# Patient Record
Sex: FEMALE | Race: WHITE | NOT HISPANIC OR LATINO | Employment: FULL TIME | URBAN - METROPOLITAN AREA
[De-identification: names, ages, dates, MRNs, and addresses within clinical notes are randomized per-mention and may not be internally consistent; named-entity substitution may affect disease eponyms.]

---

## 2018-03-27 ENCOUNTER — HOSPITAL ENCOUNTER (EMERGENCY)
Facility: HOSPITAL | Age: 38
Discharge: HOME | End: 2018-03-27
Attending: EMERGENCY MEDICINE
Payer: COMMERCIAL

## 2018-03-27 ENCOUNTER — APPOINTMENT (EMERGENCY)
Dept: RADIOLOGY | Facility: HOSPITAL | Age: 38
End: 2018-03-27
Payer: COMMERCIAL

## 2018-03-27 VITALS
DIASTOLIC BLOOD PRESSURE: 69 MMHG | OXYGEN SATURATION: 100 % | SYSTOLIC BLOOD PRESSURE: 106 MMHG | TEMPERATURE: 98 F | RESPIRATION RATE: 16 BRPM | HEART RATE: 56 BPM | WEIGHT: 164 LBS

## 2018-03-27 DIAGNOSIS — K29.70 GASTRITIS: Primary | ICD-10-CM

## 2018-03-27 DIAGNOSIS — R10.13 EPIGASTRIC PAIN: ICD-10-CM

## 2018-03-27 DIAGNOSIS — R07.9 CHEST PAIN: ICD-10-CM

## 2018-03-27 LAB
ALBUMIN SERPL BCP-MCNC: 3.7 G/DL (ref 3.5–5)
ALP SERPL-CCNC: 49 U/L (ref 46–116)
ALT SERPL W P-5'-P-CCNC: 40 U/L (ref 12–78)
ANION GAP SERPL CALCULATED.3IONS-SCNC: 7 MMOL/L (ref 4–13)
AST SERPL W P-5'-P-CCNC: 40 U/L (ref 5–45)
ATRIAL RATE: 55 BPM
BASOPHILS # BLD AUTO: 0.01 THOUSANDS/ΜL (ref 0–0.1)
BASOPHILS NFR BLD AUTO: 0 % (ref 0–1)
BILIRUB SERPL-MCNC: 0.4 MG/DL (ref 0.2–1)
BILIRUB UR QL STRIP: NEGATIVE
BUN SERPL-MCNC: 13 MG/DL (ref 5–25)
CALCIUM SERPL-MCNC: 9.2 MG/DL (ref 8.3–10.1)
CHLORIDE SERPL-SCNC: 102 MMOL/L (ref 100–108)
CLARITY UR: CLEAR
CO2 SERPL-SCNC: 28 MMOL/L (ref 21–32)
COLOR UR: YELLOW
COLOR, POC: YELLOW
CREAT SERPL-MCNC: 1.16 MG/DL (ref 0.6–1.3)
EOSINOPHIL # BLD AUTO: 0.01 THOUSAND/ΜL (ref 0–0.61)
EOSINOPHIL NFR BLD AUTO: 0 % (ref 0–6)
ERYTHROCYTE [DISTWIDTH] IN BLOOD BY AUTOMATED COUNT: 12.8 % (ref 11.6–15.1)
EXT PREG TEST URINE: NEGATIVE
GFR SERPL CREATININE-BSD FRML MDRD: 59 ML/MIN/1.73SQ M
GLUCOSE SERPL-MCNC: 112 MG/DL (ref 65–140)
GLUCOSE UR STRIP-MCNC: NEGATIVE MG/DL
HCT VFR BLD AUTO: 38.9 % (ref 34.8–46.1)
HGB BLD-MCNC: 12.9 G/DL (ref 11.5–15.4)
HGB UR QL STRIP.AUTO: NEGATIVE
KETONES UR STRIP-MCNC: NEGATIVE MG/DL
LEUKOCYTE ESTERASE UR QL STRIP: NEGATIVE
LIPASE SERPL-CCNC: 115 U/L (ref 73–393)
LYMPHOCYTES # BLD AUTO: 0.75 THOUSANDS/ΜL (ref 0.6–4.47)
LYMPHOCYTES NFR BLD AUTO: 15 % (ref 14–44)
MCH RBC QN AUTO: 32.3 PG (ref 26.8–34.3)
MCHC RBC AUTO-ENTMCNC: 33.2 G/DL (ref 31.4–37.4)
MCV RBC AUTO: 97 FL (ref 82–98)
MONOCYTES # BLD AUTO: 0.31 THOUSAND/ΜL (ref 0.17–1.22)
MONOCYTES NFR BLD AUTO: 6 % (ref 4–12)
NEUTROPHILS # BLD AUTO: 3.78 THOUSANDS/ΜL (ref 1.85–7.62)
NEUTS SEG NFR BLD AUTO: 79 % (ref 43–75)
NITRITE UR QL STRIP: NEGATIVE
NRBC BLD AUTO-RTO: 0 /100 WBCS
P AXIS: 74 DEGREES
PH UR STRIP.AUTO: 7 [PH] (ref 4.5–8)
PLATELET # BLD AUTO: 179 THOUSANDS/UL (ref 149–390)
PMV BLD AUTO: 10.4 FL (ref 8.9–12.7)
POTASSIUM SERPL-SCNC: 4.7 MMOL/L (ref 3.5–5.3)
PR INTERVAL: 150 MS
PROT SERPL-MCNC: 7.8 G/DL (ref 6.4–8.2)
PROT UR STRIP-MCNC: NEGATIVE MG/DL
QRS AXIS: 66 DEGREES
QRSD INTERVAL: 70 MS
QT INTERVAL: 426 MS
QTC INTERVAL: 407 MS
RBC # BLD AUTO: 4 MILLION/UL (ref 3.81–5.12)
SODIUM SERPL-SCNC: 137 MMOL/L (ref 136–145)
SP GR UR STRIP.AUTO: 1.02 (ref 1–1.03)
T WAVE AXIS: 40 DEGREES
TROPONIN I SERPL-MCNC: <0.02 NG/ML
UROBILINOGEN UR QL STRIP.AUTO: 0.2 E.U./DL
VENTRICULAR RATE: 55 BPM
WBC # BLD AUTO: 4.86 THOUSAND/UL (ref 4.31–10.16)

## 2018-03-27 PROCEDURE — 84484 ASSAY OF TROPONIN QUANT: CPT | Performed by: EMERGENCY MEDICINE

## 2018-03-27 PROCEDURE — 80053 COMPREHEN METABOLIC PANEL: CPT | Performed by: EMERGENCY MEDICINE

## 2018-03-27 PROCEDURE — 36415 COLL VENOUS BLD VENIPUNCTURE: CPT | Performed by: EMERGENCY MEDICINE

## 2018-03-27 PROCEDURE — 81025 URINE PREGNANCY TEST: CPT | Performed by: EMERGENCY MEDICINE

## 2018-03-27 PROCEDURE — 81002 URINALYSIS NONAUTO W/O SCOPE: CPT | Performed by: EMERGENCY MEDICINE

## 2018-03-27 PROCEDURE — 96374 THER/PROPH/DIAG INJ IV PUSH: CPT

## 2018-03-27 PROCEDURE — 96361 HYDRATE IV INFUSION ADD-ON: CPT

## 2018-03-27 PROCEDURE — 99284 EMERGENCY DEPT VISIT MOD MDM: CPT

## 2018-03-27 PROCEDURE — 93010 ELECTROCARDIOGRAM REPORT: CPT | Performed by: INTERNAL MEDICINE

## 2018-03-27 PROCEDURE — 83690 ASSAY OF LIPASE: CPT | Performed by: EMERGENCY MEDICINE

## 2018-03-27 PROCEDURE — 93005 ELECTROCARDIOGRAM TRACING: CPT

## 2018-03-27 PROCEDURE — 81003 URINALYSIS AUTO W/O SCOPE: CPT

## 2018-03-27 PROCEDURE — 85025 COMPLETE CBC W/AUTO DIFF WBC: CPT | Performed by: EMERGENCY MEDICINE

## 2018-03-27 RX ORDER — KETOROLAC TROMETHAMINE 30 MG/ML
15 INJECTION, SOLUTION INTRAMUSCULAR; INTRAVENOUS ONCE
Status: COMPLETED | OUTPATIENT
Start: 2018-03-27 | End: 2018-03-27

## 2018-03-27 RX ORDER — SUCRALFATE ORAL 1 G/10ML
1 SUSPENSION ORAL 4 TIMES DAILY
Qty: 420 ML | Refills: 0 | Status: SHIPPED | OUTPATIENT
Start: 2018-03-27 | End: 2018-08-22

## 2018-03-27 RX ORDER — MAGNESIUM HYDROXIDE/ALUMINUM HYDROXICE/SIMETHICONE 120; 1200; 1200 MG/30ML; MG/30ML; MG/30ML
30 SUSPENSION ORAL ONCE
Status: COMPLETED | OUTPATIENT
Start: 2018-03-27 | End: 2018-03-27

## 2018-03-27 RX ORDER — RANITIDINE 150 MG/1
150 CAPSULE ORAL 2 TIMES DAILY
Qty: 56 CAPSULE | Refills: 0 | Status: SHIPPED | OUTPATIENT
Start: 2018-03-27 | End: 2018-08-22

## 2018-03-27 RX ADMIN — SODIUM CHLORIDE 1000 ML: 0.9 INJECTION, SOLUTION INTRAVENOUS at 11:21

## 2018-03-27 RX ADMIN — LIDOCAINE HYDROCHLORIDE 15 ML: 20 SOLUTION ORAL; TOPICAL at 11:16

## 2018-03-27 RX ADMIN — KETOROLAC TROMETHAMINE 15 MG: 30 INJECTION, SOLUTION INTRAMUSCULAR at 11:22

## 2018-03-27 RX ADMIN — ALUMINUM HYDROXIDE, MAGNESIUM HYDROXIDE, AND SIMETHICONE 30 ML: 200; 200; 20 SUSPENSION ORAL at 11:16

## 2018-03-27 NOTE — DISCHARGE INSTRUCTIONS
Abdominal Pain   WHAT YOU NEED TO KNOW:   Abdominal pain can be dull, achy, or sharp  You may have pain in one area of your abdomen, or in your entire abdomen  Your pain may be caused by a condition such as constipation, food sensitivity or poisoning, infection, or a blockage  Abdominal pain can also be from a hernia, appendicitis, or an ulcer  Liver, gallbladder, or kidney conditions can also cause abdominal pain  The cause of your abdominal pain may be unknown  DISCHARGE INSTRUCTIONS:   Return to the emergency department if:   · You have new chest pain or shortness of breath  · You have pulsing pain in your upper abdomen or lower back that suddenly becomes constant  · Your pain is in the right lower abdominal area and worsens with movement  · You have a fever over 100 4°F (38°C) or shaking chills  · You are vomiting and cannot keep food or liquids down  · Your pain does not improve or gets worse over the next 8 to 12 hours  · You see blood in your vomit or bowel movements, or they look black and tarry  · Your skin or the whites of your eyes turn yellow  · You are a woman and have a large amount of vaginal bleeding that is not your monthly period  Contact your healthcare provider if:   · You have pain in your lower back  · You are a man and have pain in your testicles  · You have pain when you urinate  · You have questions or concerns about your condition or care  Follow up with your healthcare provider within 24 hours or as directed:  Write down your questions so you remember to ask them during your visits  Medicines:   · Medicines  may be given to calm your stomach and prevent vomiting or to decrease pain  Ask how to take pain medicine safely  · Take your medicine as directed  Contact your healthcare provider if you think your medicine is not helping or if you have side effects  Tell him of her if you are allergic to any medicine   Keep a list of the medicines, vitamins, and herbs you take  Include the amounts, and when and why you take them  Bring the list or the pill bottles to follow-up visits  Carry your medicine list with you in case of an emergency  © 2017 [address]  Information is for End User's use only and may not be sold, redistributed or otherwise used for commercial purposes  All illustrations and images included in CareNotes® are the copyrighted property of A D [employer] M [employer] Inc  or Cristofer Wagner  The above information is an  only  It is not intended as medical advice for individual conditions or treatments  Talk to your doctor, nurse or pharmacist before following any medical regimen to see if it is safe and effective for you  Gastritis   WHAT YOU NEED TO KNOW:   Gastritis is inflammation or irritation of the lining of your stomach  DISCHARGE INSTRUCTIONS:   Call 911 for any of the following:   · You develop chest pain or shortness of breath  Return to the emergency department if:   · You vomit blood  · You have black or bloody bowel movements  · You have severe stomach or back pain  Contact your healthcare provider if:   · You have a fever  · You have new or worsening symptoms, even after treatment  · You have questions or concerns about your condition or care  Medicines:   · Medicines  may be given to help treat a bacterial infection or decrease stomach acid  · Take your medicine as directed  Contact your healthcare provider if you think your medicine is not helping or if you have side effects  Tell him or her if you are allergic to any medicine  Keep a list of the medicines, vitamins, and herbs you take  Include the amounts, and when and why you take them  Bring the list or the pill bottles to follow-up visits  Carry your medicine list with you in case of an emergency  Manage or prevent gastritis:   · Do not smoke    Nicotine and other chemicals in cigarettes and cigars can make your symptoms worse and cause lung damage  Ask your healthcare provider for information if you currently smoke and need help to quit  E-cigarettes or smokeless tobacco still contain nicotine  Talk to your healthcare provider before you use these products  · Do not drink alcohol  Alcohol can prevent healing and make your gastritis worse  Talk to your healthcare provider if you need help to stop drinking  · Do not take NSAIDs or aspirin unless directed  These and similar medicines can cause irritation  If your healthcare provider says it is okay to take NSAIDs, take them with food  · Do not eat foods that cause irritation  Foods such as oranges and salsa can cause burning or pain  Eat a variety of healthy foods  Examples include fruits (not citrus), vegetables, low-fat dairy products, beans, whole-grain breads, and lean meats and fish  Try to eat small meals, and drink water with your meals  Do not eat for at least 3 hours before you go to bed  · Find ways to relax and decrease stress  Stress can increase stomach acid and make gastritis worse  Activities such as yoga, meditation, or listening to music can help you relax  Spend time with friends, or do things you enjoy  Follow up with your healthcare provider as directed: You may need ongoing tests or treatment, or referral to a gastroenterologist  Write down your questions so you remember to ask them during your visits  © 2017 [address] Information is for End User's use only and may not be sold, redistributed or otherwise used for commercial purposes  All illustrations and images included in CareNotes® are the copyrighted property of [employer]  or [address]  The above information is an  only  It is not intended as medical advice for individual conditions or treatments  Talk to your doctor, nurse or pharmacist before following any medical regimen to see if it is safe and effective for you

## 2018-03-27 NOTE — ED PROVIDER NOTES
Final Diagnosis:  1  Gastritis    2  Chest pain    3  Epigastric pain      Chief Complaint   Patient presents with    Abdominal Pain     Started yesterday   Back Pain     Started yesterday  This is a 42year old female presenting for epigastric pain with CP  The patient states that since yesterday evening, around 7 pm, she has been having a gradually worsening pain, an epigastric discomfort going towards her chest and back  It feels like a burning pain  Denies f/ch  Mild nausea without vomiting  She feels SOB b/c if she takes a deep breath is slightly hurts more in her chest   She denied dizziness initially but then states that before coming in she had to come in by wheelchair b/c she felt dizzy as in lightheaded and weak  Denies any urinary tract infection symptoms (burning, itching, pain, blood, frequency)  Denies any upper respiratory tract infection symptoms (cough, congestion, rhinorrhea, sore throat)  Denies hx of similar  Has no appetite today  PMH:  - none  PSH:  - none  No smoking, drinking, drugs  PE:   Vitals:    03/27/18 1047   BP: 122/76   BP Location: Left arm   Pulse: 62   Resp: 16   Temp: 98 °F (36 7 °C)   TempSrc: Oral   SpO2: 100%   Weight: 74 4 kg (164 lb)   General: VSS, NAD, awake, alert  Well-nourished, well-developed  Appears stated age  Head: Normocephalic, atraumatic, nontender  Eyes: PERRL, EOM-I  No diplopia  No hyphema  No subconjunctival hemorrhages  Symmetrical lids  ENT: Atraumatic external nose and ears  MMM  No malocclusion  No stridor  Normal phonation  No drooling  Normal swallowing  Base of mouth is soft  No mastoid tenderness  Neck: Symmetric, trachea midline  No JVD  No midline neck tenderness  CV: RRR  +S1/S2  No murmurs or gallops  Peripheral pulses +2 throughout  No chest wall tenderness  Lungs:   Unlabored No retractions  CTAB, lungs sounds equal bilateral    No crepitus  No tachypnea  No paradoxical motion    Abd: +BS, soft  Moderate epigastric tenderness focally  Rest of abdomen minimally tender  ND   No guarding  No rigidity  No rebound  No hepatosplenomegaly noted  No peritoneal signs  Psoas/obturator/heel strike signs are absent  MSK:   FROM   No lower extremity edema  Back:   No CVAT  Skin: Dry, intact  Neuro: AAOx3, GCS 15, CN II-XII grossly intact  Motor grossly intact  Psychiatric/Behavioral: Appropriate mood and affect   Exam: deferred  A:  - Epigastric pain/tenderness  - CP/SOB  P:  - The patient is less than 50, has an initial HR < 100, O2 Saturation >95%, no hx of previous VTE, no recent trauma or surgery within 4 weeks, no hemoptylsis, and is not on any exogenous estrogen  The patient has no need for further workup of PE and has  <2% chance of PE  My initial pre-test probability of PE is <15% and PERC Rule criteria are satisfied   - Will do ACS w/u given the age  My suspicion is minimal given symptoms are continuous since yesterday  - Sounds like gastritis  Will trial GI cocktail  - Will trial toradol as well  - Likely DC home  - In terms of her dizziness --> EKG --> IVF (she hasn't eaten since onset of pain likely contributing to the sensation)  Will also do urine for ketonuria as marker for dehydration  - 13 point ROS was performed and all are normal unless stated in the history above  - Nursing note reviewed  Vitals reviewed  - Orders placed by myself and/or advanced practitioner / resident     - Previous chart was not reviewed  - No language barrier    - History obtained from patient  - There are no limitations to the history obtained  - Critical care time: Not applicable for this patient       ED Course as of Mar 27 1241   Tue Mar 27, 2018   1113 PE risk, Wells score = [0]   (0) clinically suspected DVT - 3 points   (0) alternative diagnosis is less likely than PE - 3 0 points   (0) tachycardia - 1 5 points   (0) immobilization/surgery in previous four weeks - 1 5 points   (0) history of DVT or PE - 1 5 points   (0) hemoptysis - 1 0 points   (0) malignancy (treatment for within 6 months, palliative) - 1 0 points   Interpretation Dominguez Abhi al  2007 Radiology 029:08-32):   Score <2 0 - Low (probability 15% based on pooled data)     1122 Procedure Note: EKG  Date/Time: 03/27/18 11:22 AM   Performed by: Juan Daniel Torres  Authorized by: Juan Daniel Torres   Indications / Diagnosis: CP  ECG reviewed by me, the ED Provider: yes   The EKG demonstrates:  Rhythm: HR 55 SB   Intervals: normal intervals  Axis: normal axis  QRS/Blocks: normal QRS  ST Changes: No acute ST Changes, no STD/XIMENA  No old  91 21 06 Discussed with patient that all of her laboratory work is normal      1211 Discussed with nurse to get troponin  Then will DC         Medications   sodium chloride 0 9 % bolus 1,000 mL (0 mL Intravenous Stopped 3/27/18 1214)   ketorolac (TORADOL) injection 15 mg (15 mg Intravenous Given 3/27/18 1122)   aluminum-magnesium hydroxide-simethicone (MYLANTA) 200-200-20 mg/5 mL oral suspension 30 mL (30 mL Oral Given 3/27/18 1116)   lidocaine viscous (XYLOCAINE) 2 % mucosal solution 15 mL (15 mL Swish & Swallow Given 3/27/18 1116)     No orders to display     Orders Placed This Encounter   Procedures    CBC and differential    Comprehensive metabolic panel    Lipase    Troponin I    Insert peripheral IV    Continuous cardiac monitoring    POCT pregnancy, urine    POCT urinalysis dipstick    ECG 12 lead     Labs Reviewed   CBC AND DIFFERENTIAL - Abnormal        Result Value Ref Range Status    WBC 4 86  4 31 - 10 16 Thousand/uL Final    RBC 4 00  3 81 - 5 12 Million/uL Final    Hemoglobin 12 9  11 5 - 15 4 g/dL Final    Hematocrit 38 9  34 8 - 46 1 % Final    MCV 97  82 - 98 fL Final    MCH 32 3  26 8 - 34 3 pg Final    MCHC 33 2  31 4 - 37 4 g/dL Final    RDW 12 8  11 6 - 15 1 % Final    MPV 10 4  8 9 - 12 7 fL Final    Platelets 366  967 - 390 Thousands/uL Final    nRBC 0  /100 WBCs Final    Neutrophils Relative 79 (*) 43 - 75 % Final    Lymphocytes Relative 15  14 - 44 % Final    Monocytes Relative 6  4 - 12 % Final    Eosinophils Relative 0  0 - 6 % Final    Basophils Relative 0  0 - 1 % Final    Neutrophils Absolute 3 78  1 85 - 7 62 Thousands/µL Final    Lymphocytes Absolute 0 75  0 60 - 4 47 Thousands/µL Final    Monocytes Absolute 0 31  0 17 - 1 22 Thousand/µL Final    Eosinophils Absolute 0 01  0 00 - 0 61 Thousand/µL Final    Basophils Absolute 0 01  0 00 - 0 10 Thousands/µL Final   LIPASE - Normal    Lipase 115  73 - 393 u/L Final   TROPONIN I - Normal    Troponin I <0 02  <=0 04 ng/mL Final    Comment: 3Autovalidation override    Narrative:     [employer] Chemistry analyzer 99% cutoff is > 0 04 ng/mL in network labs    o cTnI 99% cutoff is useful only when applied to patients in the clinical setting of myocardial ischemia  o cTnI 99% cutoff should be interpreted in the context of clinical history, ECG findings and possibly cardiac imaging to establish correct diagnosis  o cTnI 99% cutoff may be suggestive but clearly not indicative of a coronary event without the clinical setting of myocardial ischemia     POCT PREGNANCY, URINE - Normal    EXT PREG TEST UR (Ref: Negative) Negative   Final   POCT URINALYSIS DIPSTICK - Normal    Color, UA Yellow   Final   ED URINE MACROSCOPIC - Normal    Color, UA Yellow   Final    Clarity, UA Clear   Final    pH, UA 7 0  4 5 - 8 0 Final    Leukocytes, UA Negative  Negative Final    Nitrite, UA Negative  Negative Final    Protein, UA Negative  Negative mg/dl Final    Glucose, UA Negative  Negative mg/dl Final    Ketones, UA Negative  Negative mg/dl Final    Urobilinogen, UA 0 2  0 2, 1 0 E U /dl E U /dl Final    Bilirubin, UA Negative  Negative Final    Blood, UA Negative  Negative Final    Specific Gravity, UA 1 020  1 003 - 1 030 Final    Narrative:     CLINITEK RESULT   COMPREHENSIVE METABOLIC PANEL    Sodium 611  136 - 145 mmol/L Final    Potassium 4 7  3 5 - 5 3 mmol/L Final Chloride 102  100 - 108 mmol/L Final    CO2 28  21 - 32 mmol/L Final    Anion Gap 7  4 - 13 mmol/L Final    BUN 13  5 - 25 mg/dL Final    Creatinine 1 16  0 60 - 1 30 mg/dL Final    Comment: Standardized to IDMS reference method    Glucose 112  65 - 140 mg/dL Final    Comment:   If the patient is fasting, the ADA then defines impaired fasting glucose as > 100 mg/dL and diabetes as > or equal to 123 mg/dL  Specimen collection should occur prior to Sulfasalazine administration due to the potential for falsely depressed results  Specimen collection should occur prior to Sulfapyridine administration due to the potential for falsely elevated results  Calcium 9 2  8 3 - 10 1 mg/dL Final    AST 40  5 - 45 U/L Final    Comment:   Specimen collection should occur prior to Sulfasalazine administration due to the potential for falsely depressed results  ALT 40  12 - 78 U/L Final    Comment:   Specimen collection should occur prior to Sulfasalazine administration due to the potential for falsely depressed results  Alkaline Phosphatase 49  46 - 116 U/L Final    Total Protein 7 8  6 4 - 8 2 g/dL Final    Albumin 3 7  3 5 - 5 0 g/dL Final    Total Bilirubin 0 40  0 20 - 1 00 mg/dL Final    eGFR 59  ml/min/1 73sq m Final    Narrative:     National Kidney Disease Education Program recommendations are as follows:  GFR calculation is accurate only with a steady state creatinine  Chronic Kidney disease less than 60 ml/min/1 73 sq  meters  Kidney failure less than 15 ml/min/1 73 sq  meters       Time reflects when diagnosis was documented in both MDM as applicable and the Disposition within this note     Time User Action Codes Description Comment    3/27/2018 12:10 PM Agustin Holy Add [R07 9] Chest pain     3/27/2018 12:10 PM Michelle Shelter [R10 13] Epigastric pain     3/27/2018 12:10 PM Agustin Holy Add [K29 70] Gastritis     3/27/2018 12:10 PM Agustin Holy Modify [R07 9] Chest pain 3/27/2018 12:10 PM Blessing Grajeda Modify [K29 70] Gastritis       ED Disposition     ED Disposition Condition Comment    Discharge  Shaneka George discharge to home/self care  Condition at discharge: Good        Follow-up Information     Follow up With Specialties Details Why [address] [facility] Emergency Department Emergency Medicine Go to If symptoms worsen Bibi Multani [address] [facility], [address], [place], [address]        Patient's Medications   Discharge Prescriptions    RANITIDINE (ZANTAC) 150 MG CAPSULE    Take 1 capsule (150 mg total) by mouth 2 (two) times a day for 28 days       Start Date: 3/27/2018 End Date: 4/24/2018       Order Dose: 150 mg       Quantity: 56 capsule    Refills: 0    SUCRALFATE (CARAFATE) 1 G/10 ML SUSPENSION    Take 10 mL (1 g total) by mouth 4 (four) times a day       Start Date: 3/27/2018 End Date: --       Order Dose: 1 g       Quantity: 420 mL    Refills: 0     No discharge procedures on file  Prior to Admission Medications   Prescriptions Last Dose Informant Patient Reported? Taking? Linaclotide (LINZESS) 290 MCG CAPS   Yes Yes   Sig: Take 290 mcg by mouth daily as needed      Facility-Administered Medications: None       Portions of the record may have been created with voice recognition software  Occasional wrong word or "sound a like" substitutions may have occurred due to the inherent limitations of voice recognition software  Read the chart carefully and recognize, using context, where substitutions have occurred      Electronically signed by:  Scooter Goodwin MD  03/27/18 5631

## 2018-08-15 ENCOUNTER — HOSPITAL ENCOUNTER (EMERGENCY)
Facility: HOSPITAL | Age: 38
Discharge: HOME | End: 2018-08-15
Payer: COMMERCIAL

## 2018-08-15 ENCOUNTER — APPOINTMENT (EMERGENCY)
Dept: RADIOLOGY | Facility: HOSPITAL | Age: 38
End: 2018-08-15
Payer: COMMERCIAL

## 2018-08-15 VITALS
RESPIRATION RATE: 16 BRPM | WEIGHT: 152 LBS | DIASTOLIC BLOOD PRESSURE: 68 MMHG | SYSTOLIC BLOOD PRESSURE: 117 MMHG | OXYGEN SATURATION: 100 % | HEART RATE: 67 BPM | TEMPERATURE: 97.8 F

## 2018-08-15 DIAGNOSIS — M54.2 NECK PAIN ON LEFT SIDE: Primary | ICD-10-CM

## 2018-08-15 DIAGNOSIS — M25.512 LEFT SHOULDER PAIN: ICD-10-CM

## 2018-08-15 PROCEDURE — 99284 EMERGENCY DEPT VISIT MOD MDM: CPT

## 2018-08-15 PROCEDURE — 73030 X-RAY EXAM OF SHOULDER: CPT

## 2018-08-15 RX ORDER — NAPROXEN 500 MG/1
500 TABLET ORAL 2 TIMES DAILY WITH MEALS
Qty: 10 TABLET | Refills: 0 | Status: SHIPPED | OUTPATIENT
Start: 2018-08-15 | End: 2018-10-02

## 2018-08-15 RX ORDER — METHOCARBAMOL 500 MG/1
500 TABLET, FILM COATED ORAL 4 TIMES DAILY
Qty: 20 TABLET | Refills: 0 | Status: SHIPPED | OUTPATIENT
Start: 2018-08-15 | End: 2018-10-02
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
## 2018-08-15 NOTE — DISCHARGE INSTRUCTIONS
Acute Neck Pain   WHAT YOU NEED TO KNOW:   Acute neck pain starts suddenly, increases quickly, and goes away in a few days  The pain may come and go, or be worse with certain movements  The pain may be only in your neck, or it may move to your arms, back, or shoulders  You may also have pain that starts in another body area and moves to your neck  DISCHARGE INSTRUCTIONS:   Return to the emergency department if:   · You have an injury that causes neck pain and shooting pain down your arms or legs  · Your neck pain suddenly becomes severe  · You have neck pain along with numbness, tingling, or weakness in your arms or legs  · You have a stiff neck, a headache, and a fever  Contact your healthcare provider if:   · You have new or worsening symptoms  · Your symptoms continue even after treatment  · You have questions or concerns about your condition or care  Medicines:   · NSAIDs , such as ibuprofen, help decrease swelling, pain, and fever  This medicine is available without a doctor's order  Ask your healthcare provider which medicine to take and how often to take it  Follow directions  NSAIDs can cause stomach bleeding or kidney problems if not taken correctly  If you take blood thinner medicine, always ask if NSAIDs are safe for you  · Acetaminophen  helps decrease pain and fever  Ask your healthcare provider how much to take and how often to take it  Follow directions  Acetaminophen can cause liver damage if not taken correctly  · Steroid medicine  may be used to reduce inflammation  This can help relieve pain caused by swelling  · Take your medicine as directed  Contact your healthcare provider if you think your medicine is not helping or if you have side effects  Tell him or her if you are allergic to any medicine  Keep a list of the medicines, vitamins, and herbs you take  Include the amounts, and when and why you take them  Bring the list or the pill bottles to follow-up visits  Carry your medicine list with you in case of an emergency  Manage or prevent acute neck pain:   · Rest your neck as directed  Do not make sudden movements, such as turning your head quickly  Your healthcare provider may recommend you wear a cervical collar for a short time  The collar will prevent you from moving your head  This will give your neck time to heal if an injury is causing your neck pain  Ask your healthcare provider when you can return to sports or other normal daily activities  · Apply heat as directed  Heat helps relieve pain and swelling  Use a heat wrap, or soak a small towel in warm water  Wring out the extra water  Apply the heat wrap or towel for 20 minutes every hour, or as directed  · Apply ice as directed  Ice helps relieve pain and swelling, and can help prevent tissue damage  Use an ice pack, or put ice in a bag  Cover the ice pack or back with a towel before you apply it to your neck  Apply the ice pack or ice for 15 minutes every hour, or as directed  Your healthcare provider can tell you how often to apply ice  · Do neck exercises as directed  Neck exercises help strengthen the muscles and increase range of motion  Your healthcare provider will tell you which exercises are right for you  He may give you instructions, or he may recommend that you work with a physical therapist  Your healthcare provider or therapist can make sure you are doing the exercises correctly  · Maintain good posture  Try to keep your head and shoulders lifted when you sit  If you work in front of a computer, make sure the monitor is at the right level  You should not need to look up down to see the screen  You should also not have to lean forward to be able to read what is on the screen  Make sure your keyboard, mouse, and other computer items are placed where you do not have to extend your shoulder to reach them  Get up often if you work in front of a computer or sit for long periods of time  Stretch or walk around to keep your neck muscles loose  Follow up with your healthcare provider as directed: Your healthcare provider may refer you to a specialist if your pain does not get better with treatment  Write down your questions so you remember to ask them during your visits  © 2017 [address] [employer] [employer] Information is for End User's use only and may not be sold, redistributed or otherwise used for commercial purposes  All illustrations and images included in CareNotes® are the copyrighted property of A [employer] M [employer]  or [address]  The above information is an  only  It is not intended as medical advice for individual conditions or treatments  Talk to your doctor, nurse or pharmacist before following any medical regimen to see if it is safe and effective for you  Arm Pain   WHAT YOU NEED TO KNOW:   Your arm pain may be caused by a number of conditions  Examples include arthritis, nerve problems, or an awkward position while you sleep  X-rays did not show a broken bone in your arm or wrist  Arm pain may be a sign of a serious condition that needs immediate care, such as a heart attack  DISCHARGE INSTRUCTIONS:   Call 911 for any of the following: You have any of the following signs of a heart attack:   · Squeezing, pressure, or pain in your chest that lasts longer than 5 minutes or returns    · Discomfort or pain in your back, neck, jaw, stomach, or arm     · Trouble breathing or a fast, fluttery heartbeat    · Nausea or vomiting    · Lightheadedness or a sudden cold sweat, especially with chest pain or trouble breathing  Return to the emergency department if:   · You have severe pain, or pain that spreads from your arm to other areas  · You have swelling, tingling, or numbness in your hand or fingers, or the skin turns blue  · You cannot move your arm    Contact your healthcare provider if:   · You have questions or concerns about your condition or care     Medicines: You may need any of the following:  · Prescription pain medicine  may be given  Ask how to take this medicine safely  · NSAIDs , such as ibuprofen, help decrease swelling, pain, and fever  This medicine is available with or without a doctor's order  NSAIDs can cause stomach bleeding or kidney problems in certain people  If you take blood thinner medicine, always ask your healthcare provider if NSAIDs are safe for you  Always read the medicine label and follow directions  · Take your medicine as directed  Contact your healthcare provider if you think your medicine is not helping or if you have side effects  Tell him or her if you are allergic to any medicine  Keep a list of the medicines, vitamins, and herbs you take  Include the amounts, and when and why you take them  Bring the list or the pill bottles to follow-up visits  Carry your medicine list with you in case of an emergency  Self-care:   · Rest your arm as directed  A sling may be used to keep your arm from moving while it heals  · Apply ice as directed  Ice helps decrease pain and swelling  Ice may also help prevent tissue damage  Use an ice pack, or put crushed ice in a plastic bag  Cover it with a towel  Apply it to your arm for 20 minutes every few hours, or as directed  Ask how many times to apply ice each day, and for how many days  · Elevate your arm above the level of your heart as often as you can  This will help decrease swelling and pain  Prop your arm on pillows or blankets to keep the area elevated comfortably  · Adjust your position if you work in front of a computer  You may need arm or wrist supports or change the height of your chair  · Keep a pain record  Write down when your pain happens and how severe it is  Include any other symptoms you have with your pain  A record will help you keep track of pain cycles  Bring the record with you to your follow-up visits   It may also help your healthcare provider find out what is causing your pain  Follow up with your healthcare provider as directed: You may need physical therapy  You may need to see an orthopedic specialist  Write down your questions so you remember to ask them during your visits  © 2017 [address] Information is for End User's use only and may not be sold, redistributed or otherwise used for commercial purposes  All illustrations and images included in CareNotes® are the copyrighted property of [employer]  or [employer]  The above information is an  only  It is not intended as medical advice for individual conditions or treatments  Talk to your doctor, nurse or pharmacist before following any medical regimen to see if it is safe and effective for you

## 2018-08-22 ENCOUNTER — HOSPITAL ENCOUNTER (EMERGENCY)
Facility: HOSPITAL | Age: 38
Discharge: HOME | End: 2018-08-22
Attending: EMERGENCY MEDICINE | Admitting: EMERGENCY MEDICINE
Payer: COMMERCIAL

## 2018-08-22 VITALS
OXYGEN SATURATION: 100 % | HEART RATE: 62 BPM | TEMPERATURE: 98.4 F | WEIGHT: 152 LBS | SYSTOLIC BLOOD PRESSURE: 113 MMHG | RESPIRATION RATE: 16 BRPM | DIASTOLIC BLOOD PRESSURE: 62 MMHG

## 2018-08-22 DIAGNOSIS — M62.838 MUSCLE SPASM: Primary | ICD-10-CM

## 2018-08-22 PROCEDURE — 99283 EMERGENCY DEPT VISIT LOW MDM: CPT

## 2018-08-22 RX ORDER — LIDOCAINE 50 MG/G
1 PATCH TOPICAL ONCE
Status: DISCONTINUED | OUTPATIENT
Start: 2018-08-22 | End: 2018-08-22 | Stop reason: HOSPADM

## 2018-08-22 RX ORDER — IBUPROFEN 600 MG/1
600 TABLET ORAL EVERY 6 HOURS PRN
Qty: 12 TABLET | Refills: 0 | Status: SHIPPED | OUTPATIENT
Start: 2018-08-22 | End: 2018-10-02

## 2018-08-22 RX ORDER — LIDOCAINE 50 MG/G
1 PATCH TOPICAL DAILY
Qty: 1 PATCH | Refills: 0 | Status: SHIPPED | OUTPATIENT
Start: 2018-08-24 | End: 2018-10-02

## 2018-08-22 RX ADMIN — LIDOCAINE 1 PATCH: 50 PATCH TOPICAL at 17:58
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
## 2018-08-22 NOTE — DISCHARGE INSTRUCTIONS
Muscle Spasm   WHAT YOU NEED TO KNOW:   A muscle spasm is a sudden contraction of any muscle or group of muscles  A muscle cramp is a painful muscle spasm  Muscle cramps commonly occur after intense exercise or during pregnancy  They may also be caused by certain medications, dehydration, low calcium or magnesium levels, or another medical condition  DISCHARGE INSTRUCTIONS:   Medicines: You may need the following:  · NSAIDs  help decrease swelling and pain or fever  This medicine is available with or without a doctor's order  NSAIDs can cause stomach bleeding or kidney problems in certain people  If you take blood thinner medicine, always ask your healthcare provider if NSAIDs are safe for you  Always read the medicine label and follow directions  · Take your medicine as directed  Contact your healthcare provider if you think your medicine is not helping or if you have side effects  Tell him of her if you are allergic to any medicine  Keep a list of the medicines, vitamins, and herbs you take  Include the amounts, and when and why you take them  Bring the list or the pill bottles to follow-up visits  Carry your medicine list with you in case of an emergency  Follow up with your healthcare provider as directed: You may need other tests or treatment  You may also be referred to a physical therapist or other specialist  Write down your questions so you remember to ask them during your visits  Self-care:   · Stretch  your muscle to help relieve the cramp  It may be helpful to keep your muscle in the stretched position until the cramp is gone  · Apply heat  to help decrease pain and muscle spasms  Apply heat on the area for 20 to 30 minutes every 2 hours for as many days as directed  · Apply ice  to help decrease swelling and pain  Ice may also help prevent tissue damage  Use an ice pack, or put crushed ice in a plastic bag   Cover it with a towel and place it on your muscle for 15 to 20 minutes every hour or as directed  · Drink more liquids  to help prevent muscle cramps caused by dehydration  Sports drinks may help replace electrolytes you lose through sweat during exercise  Ask your healthcare provider how much liquid to drink each day and which liquids are best for you  · Eat healthy foods , such as fruits, vegetables, whole grains, low-fat dairy products, and lean proteins (meat, beans, and fish)  If you are pregnant, ask your healthcare provider about foods that are high in magnesium and sodium  They may help to relieve cramps during pregnancy  · Massage your muscle  to help relieve the cramp  · Take frequent deep breaths  until the cramp feels better  Lie down while you take the deep breaths so you do not get dizzy or lightheaded  Contact your healthcare provider if:   · You have signs of dehydration, such as a headache, dark yellow urine, dry eyes or mouth, or a fast heartbeat  · You have questions or concerns about your condition or care  Return to the emergency department if:   · You have warmth, swelling, or redness in the cramping muscle  · You have frequent or unrelieved muscle cramps in several different muscles  · You have muscle cramps with numbness, tingling, and burning in your hands and feet  © 2017 [address] Information is for End User's use only and may not be sold, redistributed or otherwise used for commercial purposes  All illustrations and images included in CareNotes® are the copyrighted property of [employer]  or [employer]  The above information is an  only  It is not intended as medical advice for individual conditions or treatments  Talk to your doctor, nurse or pharmacist before following any medical regimen to see if it is safe and effective for you

## 2018-10-02 ENCOUNTER — OFFICE VISIT (OUTPATIENT)
Dept: FAMILY MEDICINE CLINIC | Facility: CLINIC | Age: 38
End: 2018-10-02
Payer: COMMERCIAL

## 2018-10-02 VITALS
HEART RATE: 99 BPM | SYSTOLIC BLOOD PRESSURE: 120 MMHG | HEIGHT: 66 IN | TEMPERATURE: 97.4 F | BODY MASS INDEX: 25.88 KG/M2 | OXYGEN SATURATION: 98 % | DIASTOLIC BLOOD PRESSURE: 78 MMHG | WEIGHT: 161 LBS | RESPIRATION RATE: 20 BRPM

## 2018-10-02 DIAGNOSIS — K59.09 CHRONIC CONSTIPATION: ICD-10-CM

## 2018-10-02 DIAGNOSIS — Z28.21 IMMUNIZATION NOT CARRIED OUT BECAUSE OF PATIENT REFUSAL: ICD-10-CM

## 2018-10-02 DIAGNOSIS — K29.60 OTHER GASTRITIS WITHOUT HEMORRHAGE, UNSPECIFIED CHRONICITY: ICD-10-CM

## 2018-10-02 DIAGNOSIS — A04.8 H. PYLORI INFECTION: ICD-10-CM

## 2018-10-02 DIAGNOSIS — E66.3 OVERWEIGHT: Primary | ICD-10-CM

## 2018-10-02 DIAGNOSIS — Z12.31 SCREENING MAMMOGRAM, ENCOUNTER FOR: ICD-10-CM

## 2018-10-02 PROBLEM — K29.70 GASTRITIS: Status: ACTIVE | Noted: 2018-10-02

## 2018-10-02 PROBLEM — L65.9 ALOPECIA: Status: ACTIVE | Noted: 2018-10-02

## 2018-10-02 PROCEDURE — 99203 OFFICE O/P NEW LOW 30 MIN: CPT | Performed by: FAMILY MEDICINE

## 2018-10-05 NOTE — PROGRESS NOTES
Assessment/Plan:      Diagnoses and all orders for this visit:    Overweight  Comments:  Advised to lose weight  Diet and exercise discussed  Orders:  -     Lipid panel; Future  -     Comprehensive metabolic panel; Future    Other gastritis without hemorrhage, unspecified chronicity  Comments:  Doing well, to follow with GI    H  pylori infection  Comments:  Was treated  Patient is following with GI    Chronic constipation  Comments:  Diet discussed    Immunization not carried out because of patient refusal  Comments:  Flu shot    Screening mammogram, encounter for  -     Mammo screening bilateral w cad; Future    Other orders  -     BIOTIN PO; Take by mouth          Subjective:     Patient ID: Kourtney Luna is a 34 y o  female  Patient used to be a patient in this office  Patient to reestablish  History of chronic constipation  It has improved feet ,did follow with GI  Denied abdominal pain or rectal bleeding  Also has history of gastritis and positive H pylori was treated  Denied heartburn ,, epigastric pain  Patient with also chronic alopecia  Stable , She was seen by pollo Brooks placed her on biotin  Overweight  Patient is trying to lose weight and she had lost few lb recently  Denied fatigue  Patient had gastric biopsy on June 7, 2018 noted          Review of Systems   Constitutional: Negative for activity change, appetite change, chills, fatigue, fever and unexpected weight change  HENT: Negative for congestion, ear pain, sinus pressure and sore throat  Eyes: Negative for visual disturbance  Respiratory: Negative for cough, chest tightness, shortness of breath and wheezing  Cardiovascular: Negative for chest pain, palpitations and leg swelling  Gastrointestinal: Negative for abdominal pain, blood in stool, diarrhea, nausea and vomiting  Genitourinary: Negative for dysuria, frequency, hematuria and urgency     Musculoskeletal: Negative for arthralgias, back pain, gait problem, joint swelling, myalgias and neck pain  Skin: Negative for rash  Neurological: Negative for dizziness, tremors, seizures, syncope, weakness, light-headedness and headaches  Hematological: Negative for adenopathy  Does not bruise/bleed easily  Psychiatric/Behavioral: Negative for behavioral problems, confusion, dysphoric mood and sleep disturbance  Objective:     Physical Exam   Constitutional: She is oriented to person, place, and time  She appears well-developed and well-nourished  HENT:   Head: Normocephalic  Patient has a large area of alopecia at left anterior scalp  Eyes: No scleral icterus  Neck: Neck supple  No JVD present  No thyromegaly present  Cardiovascular: Normal rate and regular rhythm  No murmur heard  Pulmonary/Chest: Effort normal and breath sounds normal    Abdominal: Soft  Bowel sounds are normal  She exhibits no distension and no mass  There is no tenderness  There is no rebound and no guarding  Musculoskeletal: She exhibits no edema or tenderness  Lymphadenopathy:     She has no cervical adenopathy  Neurological: She is alert and oriented to person, place, and time  No cranial nerve deficit  She exhibits normal muscle tone  Skin: No rash noted  No erythema  No pallor  Psychiatric: She has a normal mood and affect   Her behavior is normal  Judgment and thought content normal

## 2018-10-06 ENCOUNTER — TRANSCRIBE ORDERS (OUTPATIENT)
Dept: ADMINISTRATIVE | Facility: HOSPITAL | Age: 38
End: 2018-10-06

## 2018-10-06 ENCOUNTER — APPOINTMENT (OUTPATIENT)
Dept: LAB | Facility: HOSPITAL | Age: 38
End: 2018-10-06
Payer: COMMERCIAL

## 2018-10-06 DIAGNOSIS — E66.3 OVERWEIGHT: ICD-10-CM

## 2018-10-06 LAB
ALBUMIN SERPL BCP-MCNC: 3.9 G/DL (ref 3–5.2)
ALP SERPL-CCNC: 35 U/L (ref 43–122)
ALT SERPL W P-5'-P-CCNC: 17 U/L (ref 9–52)
ANION GAP SERPL CALCULATED.3IONS-SCNC: 7 MMOL/L (ref 5–14)
AST SERPL W P-5'-P-CCNC: 22 U/L (ref 14–36)
BILIRUB SERPL-MCNC: 0.4 MG/DL
BUN SERPL-MCNC: 13 MG/DL (ref 5–25)
CALCIUM SERPL-MCNC: 9.2 MG/DL (ref 8.4–10.2)
CHLORIDE SERPL-SCNC: 104 MMOL/L (ref 97–108)
CHOLEST SERPL-MCNC: 158 MG/DL
CO2 SERPL-SCNC: 29 MMOL/L (ref 22–30)
CREAT SERPL-MCNC: 0.98 MG/DL (ref 0.6–1.2)
GFR SERPL CREATININE-BSD FRML MDRD: 72 ML/MIN/1.73SQ M
GLUCOSE P FAST SERPL-MCNC: 86 MG/DL (ref 70–99)
HDLC SERPL-MCNC: 64 MG/DL (ref 40–59)
LDLC SERPL CALC-MCNC: 86 MG/DL
NONHDLC SERPL-MCNC: 94 MG/DL
POTASSIUM SERPL-SCNC: 4.7 MMOL/L (ref 3.6–5)
PROT SERPL-MCNC: 7.2 G/DL (ref 5.9–8.4)
SODIUM SERPL-SCNC: 140 MMOL/L (ref 137–147)
TRIGL SERPL-MCNC: 38 MG/DL

## 2018-10-06 PROCEDURE — 80053 COMPREHEN METABOLIC PANEL: CPT

## 2018-10-06 PROCEDURE — 36415 COLL VENOUS BLD VENIPUNCTURE: CPT

## 2018-10-06 PROCEDURE — 80061 LIPID PANEL: CPT

## 2018-10-14 DIAGNOSIS — E83.39 ALKALINE PHOSPHATASE DEFICIENCY: Primary | ICD-10-CM

## 2018-11-03 ENCOUNTER — HOSPITAL ENCOUNTER (OUTPATIENT)
Dept: MAMMOGRAPHY | Facility: MEDICAL CENTER | Age: 38
Discharge: HOME | End: 2018-11-03
Payer: COMMERCIAL

## 2018-11-03 DIAGNOSIS — Z12.31 SCREENING MAMMOGRAM, ENCOUNTER FOR: ICD-10-CM

## 2018-11-03 PROCEDURE — 77067 SCR MAMMO BI INCL CAD: CPT

## 2018-11-20 ENCOUNTER — HOSPITAL ENCOUNTER (OUTPATIENT)
Dept: ULTRASOUND IMAGING | Facility: CLINIC | Age: 38
Discharge: HOME | End: 2018-11-20
Payer: COMMERCIAL

## 2018-11-20 ENCOUNTER — HOSPITAL ENCOUNTER (OUTPATIENT)
Dept: MAMMOGRAPHY | Facility: CLINIC | Age: 38
Discharge: HOME | End: 2018-11-20
Payer: COMMERCIAL

## 2018-11-20 VITALS — HEIGHT: 66 IN | WEIGHT: 161 LBS | BODY MASS INDEX: 25.88 KG/M2

## 2018-11-20 DIAGNOSIS — R92.8 ABNORMAL MAMMOGRAM: ICD-10-CM

## 2018-11-20 PROCEDURE — 76642 ULTRASOUND BREAST LIMITED: CPT

## 2018-11-20 PROCEDURE — G0279 TOMOSYNTHESIS, MAMMO: HCPCS

## 2018-11-20 PROCEDURE — 77065 DX MAMMO INCL CAD UNI: CPT

## 2018-12-04 ENCOUNTER — OFFICE VISIT (OUTPATIENT)
Dept: FAMILY MEDICINE CLINIC | Facility: CLINIC | Age: 38
End: 2018-12-04
Payer: COMMERCIAL

## 2018-12-04 VITALS
DIASTOLIC BLOOD PRESSURE: 70 MMHG | BODY MASS INDEX: 25.37 KG/M2 | RESPIRATION RATE: 20 BRPM | HEART RATE: 63 BPM | TEMPERATURE: 97.6 F | SYSTOLIC BLOOD PRESSURE: 100 MMHG | OXYGEN SATURATION: 100 % | WEIGHT: 157.2 LBS

## 2018-12-04 DIAGNOSIS — Z28.21 IMMUNIZATION NOT CARRIED OUT BECAUSE OF PATIENT REFUSAL: ICD-10-CM

## 2018-12-04 DIAGNOSIS — E66.3 OVERWEIGHT: Primary | ICD-10-CM

## 2018-12-04 DIAGNOSIS — R92.8 ABNORMAL MAMMOGRAM OF RIGHT BREAST: ICD-10-CM

## 2018-12-04 DIAGNOSIS — R74.8 LOW SERUM ALKALINE PHOSPHATASE: ICD-10-CM

## 2018-12-04 PROCEDURE — 99213 OFFICE O/P EST LOW 20 MIN: CPT | Performed by: FAMILY MEDICINE

## 2018-12-07 NOTE — PROGRESS NOTES
Assessment/Plan:          Diagnoses and all orders for this visit:    Overweight  Comments:  Advised to lose weight  Diet and exercise discussed    Low serum alkaline phosphatase  Comments:  Check vitamin-D    Abnormal mammogram of right breast  Comments:  Patient had a diagnostic right mammogram and ultrasound and they were benign   repeat mammogram in 1 year    Immunization not carried out because of patient refusal  Comments:  Flu shot            Subjective:     Patient ID: Rebecca Chahal is a 34 y o  female      Patient is here for follow-up on  Overweight  Patient denied the weight gain recently  Denied fatigue  Cold intolerance or hair loss  Patient does not watch her diet or exercise  Recent mammogram questionable abnormality of the right breast   Patient denied breast mass  And she did see her gyn recently and she had breast exam    Test results  Screening bilateral mammogram 11/03/2018  Right breast mammogram and ultrasound on November 20th 2018  Lab done October 6, 2018  All discussed with patient          Review of Systems   Constitutional: Negative for fatigue  HENT: Negative for sore throat  Eyes: Negative for discharge and visual disturbance  Respiratory: Negative for cough and shortness of breath  Cardiovascular: Negative for chest pain, palpitations and leg swelling  Gastrointestinal: Negative for abdominal pain, blood in stool, constipation and diarrhea  Genitourinary: Negative for frequency and hematuria  Musculoskeletal: Negative for gait problem and myalgias  Neurological: Negative for dizziness and light-headedness  Psychiatric/Behavioral: Negative for behavioral problems  Objective:     Physical Exam   Constitutional: She is oriented to person, place, and time  She appears well-developed and well-nourished  HENT:   Head: Normocephalic  Eyes: Pupils are equal, round, and reactive to light  No scleral icterus  Neck: Neck supple  No JVD present  Cardiovascular: Normal rate, regular rhythm and normal heart sounds  Legs, no edema   Pulmonary/Chest: Effort normal and breath sounds normal    Abdominal: Soft  Bowel sounds are normal  She exhibits no mass  Lymphadenopathy:     She has no cervical adenopathy  Neurological: She is alert and oriented to person, place, and time  Coordination normal    Skin: No rash noted  Psychiatric: She has a normal mood and affect   Her behavior is normal

## 2019-02-26 ENCOUNTER — OFFICE VISIT (OUTPATIENT)
Dept: FAMILY MEDICINE CLINIC | Facility: CLINIC | Age: 39
End: 2019-02-26
Payer: COMMERCIAL

## 2019-02-26 VITALS
OXYGEN SATURATION: 100 % | HEART RATE: 62 BPM | WEIGHT: 163.6 LBS | TEMPERATURE: 97.1 F | RESPIRATION RATE: 20 BRPM | SYSTOLIC BLOOD PRESSURE: 100 MMHG | DIASTOLIC BLOOD PRESSURE: 60 MMHG | BODY MASS INDEX: 26.41 KG/M2

## 2019-02-26 DIAGNOSIS — R74.8 LOW SERUM ALKALINE PHOSPHATASE: ICD-10-CM

## 2019-02-26 DIAGNOSIS — R63.5 WEIGHT GAIN: Primary | ICD-10-CM

## 2019-02-26 PROCEDURE — 1036F TOBACCO NON-USER: CPT | Performed by: FAMILY MEDICINE

## 2019-02-26 PROCEDURE — 99213 OFFICE O/P EST LOW 20 MIN: CPT | Performed by: FAMILY MEDICINE

## 2019-02-26 NOTE — PATIENT INSTRUCTIONS
Patient to follow up with her test results   To follow with new primary care    Because I am leaving this practice and patient not able to go to the new practice location

## 2019-03-01 NOTE — PROGRESS NOTES
Assessment/Plan:          Diagnoses and all orders for this visit:    Weight gain  -     TSH, 3rd generation with Free T4 reflex; Future  -     CBC and differential; Future  -     Comprehensive metabolic panel; Future    Low serum alkaline phosphatase  Comments:  Check vitamin-D level  Order exist            Subjective:     Patient ID: Radha Muse is a 34 y o  female      Chief complaint  Weight gain  Patient stated she gain weight , about 6 lb in the last couple months  Weight gain is constant and persistent  Patient stated she did not change her diet or her physical activity denied fatigue, cold intolerance or hair loss the    No test done since last office visit      Review of Systems   Constitutional: Negative for chills, diaphoresis and fatigue  HENT: Negative for ear pain, sore throat, trouble swallowing and voice change  Eyes: Negative for visual disturbance  Respiratory: Negative for cough, chest tightness and shortness of breath  Cardiovascular: Negative for chest pain, palpitations and leg swelling  Gastrointestinal: Negative for abdominal pain, blood in stool, constipation, diarrhea and nausea  Endocrine: Negative for polydipsia and polyuria  Genitourinary: Negative for dysuria, flank pain, frequency, hematuria, pelvic pain, urgency, vaginal bleeding and vaginal discharge  Musculoskeletal: Negative for arthralgias, back pain, gait problem, myalgias and neck pain  Neurological: Negative for dizziness, tremors, seizures, weakness, light-headedness, numbness and headaches  Hematological: Negative for adenopathy  Does not bruise/bleed easily  Psychiatric/Behavioral: Negative for confusion  Objective:     Physical Exam   Constitutional: She is oriented to person, place, and time  She appears well-developed and well-nourished  No distress  HENT:   Head: Normocephalic and atraumatic  Eyes: Pupils are equal, round, and reactive to light   Conjunctivae and EOM are normal  No scleral icterus  Neck: Neck supple  No JVD present  No thyromegaly present  Cardiovascular: Normal rate, regular rhythm and normal heart sounds  No murmur heard  Pulses:       Carotid pulses are 3+ on the right side, and 3+ on the left side  Extremities  No edema   Pulmonary/Chest: Effort normal and breath sounds normal    Abdominal: Soft  Bowel sounds are normal  She exhibits no distension and no mass  There is no tenderness  There is no rebound and no guarding  No hernia  Lymphadenopathy:     She has no cervical adenopathy  Neurological: She is alert and oriented to person, place, and time  No cranial nerve deficit  She exhibits normal muscle tone  Coordination normal    Skin: No rash noted  Psychiatric: She has a normal mood and affect   Her behavior is normal  Judgment and thought content normal

## 2020-01-24 ENCOUNTER — OFFICE VISIT (OUTPATIENT)
Dept: FAMILY MEDICINE CLINIC | Facility: CLINIC | Age: 40
End: 2020-01-24
Payer: COMMERCIAL

## 2020-01-24 VITALS
WEIGHT: 151.6 LBS | OXYGEN SATURATION: 98 % | DIASTOLIC BLOOD PRESSURE: 50 MMHG | HEART RATE: 90 BPM | BODY MASS INDEX: 25.26 KG/M2 | TEMPERATURE: 98.5 F | HEIGHT: 65 IN | SYSTOLIC BLOOD PRESSURE: 96 MMHG

## 2020-01-24 DIAGNOSIS — T14.8XXA HEMATOMA: ICD-10-CM

## 2020-01-24 DIAGNOSIS — L65.9 ALOPECIA: ICD-10-CM

## 2020-01-24 DIAGNOSIS — T14.90XA INJURY: Primary | ICD-10-CM

## 2020-01-24 DIAGNOSIS — R00.0 INCREASED HEART RATE: ICD-10-CM

## 2020-01-24 DIAGNOSIS — R63.4 WEIGHT LOSS: ICD-10-CM

## 2020-01-24 PROCEDURE — 1036F TOBACCO NON-USER: CPT | Performed by: FAMILY MEDICINE

## 2020-01-24 PROCEDURE — 99214 OFFICE O/P EST MOD 30 MIN: CPT | Performed by: FAMILY MEDICINE

## 2020-01-24 PROCEDURE — 3008F BODY MASS INDEX DOCD: CPT | Performed by: FAMILY MEDICINE

## 2020-01-24 RX ORDER — RANITIDINE 300 MG/1
300 TABLET ORAL
COMMUNITY
End: 2020-01-24 | Stop reason: ALTCHOICE

## 2020-01-24 RX ORDER — TOPIRAMATE 25 MG/1
TABLET ORAL
COMMUNITY
Start: 2019-12-27 | End: 2020-01-24 | Stop reason: ALTCHOICE

## 2020-01-24 RX ORDER — CEPHALEXIN 500 MG/1
1 CAPSULE ORAL EVERY 12 HOURS
COMMUNITY
Start: 2014-06-25 | End: 2020-01-24 | Stop reason: ALTCHOICE

## 2020-01-24 RX ORDER — BUPROPION HYDROCHLORIDE 300 MG/1
300 TABLET ORAL
COMMUNITY
End: 2020-01-24 | Stop reason: ALTCHOICE

## 2020-01-24 RX ORDER — AMITRIPTYLINE HYDROCHLORIDE 25 MG/1
25 TABLET, FILM COATED ORAL
COMMUNITY
Start: 2018-07-23 | End: 2020-01-24 | Stop reason: ALTCHOICE

## 2020-01-24 RX ORDER — METHOCARBAMOL 750 MG/1
TABLET ORAL
COMMUNITY
Start: 2019-12-18 | End: 2020-01-24 | Stop reason: ALTCHOICE

## 2020-01-24 RX ORDER — BIOTIN 10000 MCG
10000 CAPSULE ORAL
COMMUNITY

## 2020-01-24 RX ORDER — FLUCONAZOLE 150 MG/1
TABLET ORAL
COMMUNITY
Start: 2019-11-11 | End: 2020-01-24 | Stop reason: ALTCHOICE

## 2020-01-24 RX ORDER — PHENAZOPYRIDINE HYDROCHLORIDE 200 MG/1
200 TABLET, FILM COATED ORAL 3 TIMES DAILY PRN
COMMUNITY
Start: 2018-07-23 | End: 2020-01-24 | Stop reason: ALTCHOICE

## 2020-01-24 RX ORDER — PHENTERMINE HYDROCHLORIDE 37.5 MG/1
37.5 CAPSULE ORAL
COMMUNITY
End: 2020-01-24 | Stop reason: ALTCHOICE

## 2020-01-24 RX ORDER — METRONIDAZOLE 500 MG/1
TABLET ORAL
COMMUNITY
Start: 2019-11-21 | End: 2020-01-24 | Stop reason: ALTCHOICE

## 2020-01-24 NOTE — PROGRESS NOTES
Assessment/Plan:       No problem-specific Assessment & Plan notes found for this encounter  Diagnoses and all orders for this visit:    Injury  Comments:  Left lower thigh  Patient has Persistent ecchymosis and most likely hematoma  Orders:  -     US MSK limited; Future  -     US extremity soft tissue; Future  -     diclofenac sodium (Voltaren) 1 %; Apply 2 g topically 3 (three) times a day    Increased heart rate  Comments: On recheck is normal patient was rushing to come to the office    Alopecia  Comments: Following with Dermatology    Weight loss  Comments:  Intentional    Hematoma  -     US extremity soft tissue; Future    Other orders  -     Biotin 10 MG CAPS; Take 10,000 mcg by mouth  -     Discontinue: ranitidine (ZANTAC) 300 MG tablet; Take 300 mg by mouth  -     Discontinue: phentermine 37 5 MG capsule; Take 37 5 mg by mouth  -     Discontinue: phenazopyridine (PYRIDIUM) 200 mg tablet; Take 200 mg by mouth Three times daily as needed  -     Discontinue: cephalexin (KEFLEX) 500 mg capsule; 1 capsule Every 12 hours  -     Discontinue: buPROPion (WELLBUTRIN XL) 300 mg 24 hr tablet; Take 300 mg by mouth  -     Discontinue: amitriptyline (ELAVIL) 25 mg tablet; Take 25 mg by mouth  -     Discontinue: D3-50 1 25 MG (13709 UT) capsule; TK 1 C PO ONCE WEEKLY  -     Discontinue: fluconazole (DIFLUCAN) 150 mg tablet  -     Discontinue: metroNIDAZOLE (FLAGYL) 500 mg tablet  -     Discontinue: terconazole (TERAZOL 7) 0 4 % vaginal cream  -     Discontinue: topiramate (TOPAMAX) 25 mg tablet; TK 1 T PO  BID        Patient Instructions   Patient to follow up with test results      Orders Placed This Encounter   Procedures    US MSK limited     Ultrasound of the distal left eye     Standing Status:   Future     Standing Expiration Date:   1/24/2024     Scheduling Instructions:      No prep required  Please bring your insurance cards, a form of photo ID and a list of your medications with you   Arrive 15 minutes prior to your appointment time in order to register  To schedule this appointment, please contact Central Scheduling at [phone]  Order Specific Question:   Is this study for the evaluation of either shoulder or both hands for Rheumatoid Arthritis? Answer:   No    US extremity soft tissue     Left thigh, patient has a bruise with a lump since July     Standing Status:   Future     Number of Occurrences:   1     Standing Expiration Date:   1/24/2024     Scheduling Instructions:      No prep required  Please bring your insurance cards, a form of photo ID and a list of your medications with you  Arrive 15 minutes prior to your appointment time in order to register  To schedule this appointment, please contact Central Scheduling at [phone]  Subjective:     Patient ID: Carolee Rivero is a 37 y o  female        Pain  Located at the distal anterior left thigh  Patient stated in July at work she was head by a roller at the left anterior thigh she developed pain  ecchymosis and a lump   Pain resolved and less if she does the area still painful also the ecchymosis persist and the lump there but smaller, patient denied swelling  Alopecia  Patient continued to have alopecia  She is following with Dermatology for steroid injection  Is improving  Over tight weight  Patient admit to intentional weight loss by changing her diet  She is happy about the weight loss  At this office visit heart rate on presentation is high  Patient denied palpitation  she said she was rushing  rushing coming to the office  She was late and stuck in traffic so she was nervous not immune to make her appointment appointment            Review of Systems   Constitutional: Negative for chills, diaphoresis and fatigue  HENT: Negative for ear pain, sore throat, trouble swallowing and voice change  Eyes: Negative for visual disturbance     Respiratory: Negative for cough, chest tightness and shortness of breath  Cardiovascular: Negative for chest pain, palpitations and leg swelling  Gastrointestinal: Negative for abdominal pain, blood in stool, constipation, diarrhea and nausea  Endocrine: Negative for polydipsia and polyuria  Genitourinary: Negative for dysuria, flank pain, frequency, hematuria, pelvic pain, urgency, vaginal bleeding and vaginal discharge  Musculoskeletal: Negative for arthralgias, back pain, gait problem, joint swelling and neck pain  Skin: Negative for rash  Allergic/Immunologic: Negative for food allergies  Neurological: Negative for dizziness, tremors, seizures, weakness, light-headedness, numbness and headaches  Hematological: Negative for adenopathy  Does not bruise/bleed easily  Psychiatric/Behavioral: Negative for confusion and dysphoric mood  The patient is not nervous/anxious  Objective:     Physical Exam   Constitutional: She is oriented to person, place, and time  She appears well-developed and well-nourished  No distress  HENT:   Head: Normocephalic and atraumatic  Scalp  There is bold localized areas   Eyes: Pupils are equal, round, and reactive to light  Conjunctivae and EOM are normal  No scleral icterus  Neck: No JVD present  No thyromegaly present  Cardiovascular: Normal rate, regular rhythm and normal heart sounds  No murmur heard  Extremities  No edema   Pulmonary/Chest: Effort normal and breath sounds normal    Abdominal: Soft  Bowel sounds are normal  She exhibits no distension and no mass  There is no tenderness  There is no rebound and no guarding  No hernia  Musculoskeletal:   1&3/4x 1& /3/4 area at the distal anterior left thigh with a 3 smaller area of dark purple-color with a about 1 cm sub continuous lump  No tenderness  No swelling  Left knee with no acute changes and patient has for range of motion  Lymphadenopathy:     She has no cervical adenopathy     Neurological: She is alert and oriented to person, place, and time  No cranial nerve deficit or sensory deficit  She exhibits normal muscle tone  Coordination normal    Gait is normal   Skin: No rash noted  Psychiatric: She has a normal mood and affect   Her behavior is normal  Judgment and thought content normal

## 2020-01-25 ENCOUNTER — HOSPITAL ENCOUNTER (OUTPATIENT)
Dept: ULTRASOUND IMAGING | Facility: HOSPITAL | Age: 40
Discharge: HOME | End: 2020-01-25
Attending: FAMILY MEDICINE
Payer: COMMERCIAL

## 2020-01-25 DIAGNOSIS — T14.8XXA HEMATOMA: ICD-10-CM

## 2020-01-25 DIAGNOSIS — T14.90XA INJURY: ICD-10-CM

## 2020-01-25 PROCEDURE — 76882 US LMTD JT/FCL EVL NVASC XTR: CPT

## 2020-01-30 ENCOUNTER — TELEPHONE (OUTPATIENT)
Dept: OTHER | Facility: OTHER | Age: 40
End: 2020-01-30

## 2020-01-30 ENCOUNTER — TELEPHONE (OUTPATIENT)
Dept: FAMILY MEDICINE CLINIC | Facility: CLINIC | Age: 40
End: 2020-01-30

## 2020-01-30 DIAGNOSIS — T14.8XXA HEMATOMA: Primary | ICD-10-CM

## 2020-01-30 NOTE — TELEPHONE ENCOUNTER
Ultrasound of the left thigh showed focal subcu 10 units soft tissue thickening containing a few sub 3 mm avascular cystic foci  Sees finding may sequela of reported remote injury    If patient to continue to have symptoms will check MRI of the left thigh

## 2020-01-30 NOTE — TELEPHONE ENCOUNTER
Patient just missed a call from office  Please call the patient tonight with results if you get this message   the patient is anxious for them

## 2020-02-10 ENCOUNTER — TELEPHONE (OUTPATIENT)
Dept: FAMILY MEDICINE CLINIC | Facility: CLINIC | Age: 40
End: 2020-02-10

## 2020-02-10 DIAGNOSIS — T14.90XA INJURY: Primary | ICD-10-CM

## 2020-02-10 NOTE — TELEPHONE ENCOUNTER
Received phone call from [facility] MRI of hip was approved   [facility] #[id number] valid from 2/6/2020-8/4/2020

## 2020-02-22 ENCOUNTER — HOSPITAL ENCOUNTER (OUTPATIENT)
Dept: MRI IMAGING | Facility: HOSPITAL | Age: 40
Discharge: HOME | End: 2020-02-22
Attending: FAMILY MEDICINE

## 2020-02-22 ENCOUNTER — TELEPHONE (OUTPATIENT)
Dept: OTHER | Facility: OTHER | Age: 40
End: 2020-02-22

## 2020-02-22 DIAGNOSIS — T14.8XXA HEMATOMA: ICD-10-CM

## 2020-02-25 ENCOUNTER — TELEPHONE (OUTPATIENT)
Dept: OTHER | Facility: OTHER | Age: 40
End: 2020-02-25

## 2020-02-26 ENCOUNTER — TELEPHONE (OUTPATIENT)
Dept: FAMILY MEDICINE CLINIC | Facility: CLINIC | Age: 40
End: 2020-02-26

## 2020-02-26 NOTE — TELEPHONE ENCOUNTER
Called and spoke to patient and advised that she needs to make the appointment in order for us to work on the prior authorization

## 2020-02-27 ENCOUNTER — TELEPHONE (OUTPATIENT)
Dept: FAMILY MEDICINE CLINIC | Facility: CLINIC | Age: 40
End: 2020-02-27

## 2020-02-27 DIAGNOSIS — T14.90XA INJURY: Primary | ICD-10-CM

## 2020-02-27 NOTE — TELEPHONE ENCOUNTER
Patient called back regarding her MRI, advised that she needs to have a x-ray done before we can call her insurance back to change her authorization from hip to femur  Patient stated that she does not have a lot of money for the test and she is a single mom  Explained to patient that her insurance is requiring her to have the x-ray, not Dr Kevyn Francis  Patient will have the x-ray done but wanted to know what her outcome would be if she does not do the MRI, advised that we can not give her an outcome because we would not know without the scan   Advised patient to call the billing department to schedule payment plan to help with the cost

## 2020-02-27 NOTE — TELEPHONE ENCOUNTER
Spoke to Rashad Collins at [place], they will be able to do a prior authorization change from hip to femur if patient goes for x-ray of femur  Spoke to Dr Kyler Briseno, orders will be placed for x-ray  Left message for patient to return phone call   Case number for prior authorization is [id number]     Need to confirm with patient she is not pregnant for x-ray

## 2020-02-28 ENCOUNTER — APPOINTMENT (OUTPATIENT)
Dept: RADIOLOGY | Age: 40
End: 2020-02-28
Payer: COMMERCIAL

## 2020-02-28 DIAGNOSIS — T14.90XA INJURY: ICD-10-CM

## 2020-02-28 PROCEDURE — 73552 X-RAY EXAM OF FEMUR 2/>: CPT

## 2020-03-04 NOTE — TELEPHONE ENCOUNTER
Unable to complete referral from hip to femur until x-ray report is final  Will call patients insurance company once results are in

## 2020-03-06 ENCOUNTER — TELEPHONE (OUTPATIENT)
Dept: FAMILY MEDICINE CLINIC | Facility: CLINIC | Age: 40
End: 2020-03-06

## 2020-03-06 NOTE — TELEPHONE ENCOUNTER
Patients appointment for her mri of the femur was cancelled I spoke to [facility] and she cancel the appointment and I called the patient and advised her

## 2020-03-11 ENCOUNTER — TELEPHONE (OUTPATIENT)
Dept: FAMILY MEDICINE CLINIC | Facility: CLINIC | Age: 40
End: 2020-03-11

## 2020-03-11 NOTE — TELEPHONE ENCOUNTER
Spoke to [place] at  regarding femur MRI, patients MRI was approved since her x-ray results came back  Case #[id number] Authorization number [id number]   Authorization is good from 2/6/2020 to 8/4/2020

## 2020-03-18 ENCOUNTER — TELEPHONE (OUTPATIENT)
Dept: FAMILY MEDICINE CLINIC | Facility: CLINIC | Age: 40
End: 2020-03-18

## 2020-03-18 NOTE — TELEPHONE ENCOUNTER
----- Message from Trav Tobin MD sent at 3/18/2020  3:11 PM EDT -----  X-ray showed calcified tendinitis or bursitis, if patient continued to have the same symptoms check MRI of the left thigh

## 2020-03-20 ENCOUNTER — HOSPITAL ENCOUNTER (OUTPATIENT)
Dept: MRI IMAGING | Facility: HOSPITAL | Age: 40
Discharge: HOME | End: 2020-03-20
Attending: FAMILY MEDICINE
Payer: COMMERCIAL

## 2020-03-20 DIAGNOSIS — T14.90XA INJURY: ICD-10-CM

## 2020-03-20 PROCEDURE — 73718 MRI LOWER EXTREMITY W/O DYE: CPT

## 2020-03-24 ENCOUNTER — TELEPHONE (OUTPATIENT)
Dept: FAMILY MEDICINE CLINIC | Facility: CLINIC | Age: 40
End: 2020-03-24

## 2020-03-25 NOTE — TELEPHONE ENCOUNTER
Patient is still having swelling, bruising, tender to the touch on her left knee  Requesting a patch or medication

## 2020-03-26 ENCOUNTER — TELEPHONE (OUTPATIENT)
Dept: FAMILY MEDICINE CLINIC | Facility: CLINIC | Age: 40
End: 2020-03-26

## 2020-03-26 NOTE — TELEPHONE ENCOUNTER
She does not want to take a pain pill  She would like something to take for swelling and inflammation

## 2020-03-26 NOTE — TELEPHONE ENCOUNTER
Advised her to take Tylenol 500 mg 2 tablets every 8 hours   Schedule office visit if symptoms persist within 2 weeks

## 2020-03-30 NOTE — TELEPHONE ENCOUNTER
Left message on patients voice mail that rx was called into the pharmacy  Advised to call back with any other questions

## 2022-11-09 ENCOUNTER — TELEPHONE (OUTPATIENT)
Dept: OTHER | Facility: OTHER | Age: 42
End: 2022-11-09

## 2023-05-14 ENCOUNTER — HOSPITAL ENCOUNTER (EMERGENCY)
Facility: HOSPITAL | Age: 43
Discharge: HOME | End: 2023-05-14
Attending: EMERGENCY MEDICINE

## 2023-05-14 VITALS
HEART RATE: 98 BPM | DIASTOLIC BLOOD PRESSURE: 64 MMHG | WEIGHT: 180.78 LBS | TEMPERATURE: 98 F | BODY MASS INDEX: 30.08 KG/M2 | SYSTOLIC BLOOD PRESSURE: 130 MMHG | OXYGEN SATURATION: 97 % | RESPIRATION RATE: 18 BRPM

## 2023-05-14 DIAGNOSIS — S00.411A EAR CANAL ABRASION, RIGHT, INITIAL ENCOUNTER: Primary | ICD-10-CM

## 2023-05-14 LAB
BASOPHILS # BLD AUTO: 0.03 THOUSANDS/ÂΜL (ref 0–0.1)
BASOPHILS NFR BLD AUTO: 1 % (ref 0–1)
EOSINOPHIL # BLD AUTO: 0.09 THOUSAND/ÂΜL (ref 0–0.61)
EOSINOPHIL NFR BLD AUTO: 2 % (ref 0–6)
ERYTHROCYTE [DISTWIDTH] IN BLOOD BY AUTOMATED COUNT: 12.1 % (ref 11.6–15.1)
HCT VFR BLD AUTO: 39 % (ref 34.8–46.1)
HGB BLD-MCNC: 12.9 G/DL (ref 11.5–15.4)
IMM GRANULOCYTES # BLD AUTO: 0.02 THOUSAND/UL (ref 0–0.2)
IMM GRANULOCYTES NFR BLD AUTO: 1 % (ref 0–2)
INR PPP: 0.95 (ref 0.84–1.19)
LYMPHOCYTES # BLD AUTO: 1.19 THOUSANDS/ÂΜL (ref 0.6–4.47)
LYMPHOCYTES NFR BLD AUTO: 32 % (ref 14–44)
MCH RBC QN AUTO: 32.5 PG (ref 26.8–34.3)
MCHC RBC AUTO-ENTMCNC: 33.1 G/DL (ref 31.4–37.4)
MCV RBC AUTO: 98 FL (ref 82–98)
MONOCYTES # BLD AUTO: 0.4 THOUSAND/ÂΜL (ref 0.17–1.22)
MONOCYTES NFR BLD AUTO: 11 % (ref 4–12)
NEUTROPHILS # BLD AUTO: 2.05 THOUSANDS/ÂΜL (ref 1.85–7.62)
NEUTS SEG NFR BLD AUTO: 53 % (ref 43–75)
NRBC BLD AUTO-RTO: 0 /100 WBCS
PLATELET # BLD AUTO: 187 THOUSANDS/UL (ref 149–390)
PMV BLD AUTO: 9.9 FL (ref 8.9–12.7)
PROTHROMBIN TIME: 12.9 SECONDS (ref 11.6–14.5)
RBC # BLD AUTO: 3.97 MILLION/UL (ref 3.81–5.12)
WBC # BLD AUTO: 3.78 THOUSAND/UL (ref 4.31–10.16)

## 2023-05-14 RX ORDER — OXYMETAZOLINE HYDROCHLORIDE 0.05 G/100ML
2 SPRAY NASAL ONCE
Status: COMPLETED | OUTPATIENT
Start: 2023-05-14 | End: 2023-05-14

## 2023-05-14 RX ORDER — NEOMYCIN SULFATE, POLYMYXIN B SULFATE AND HYDROCORTISONE 10; 3.5; 1 MG/ML; MG/ML; [USP'U]/ML
4 SUSPENSION/ DROPS AURICULAR (OTIC) ONCE
Status: COMPLETED | OUTPATIENT
Start: 2023-05-14 | End: 2023-05-14

## 2023-05-14 RX ADMIN — NEOMYCIN SULFATE, POLYMYXIN B SULFATE AND HYDROCORTISONE 4 DROP: 10; 3.5; 1 SUSPENSION/ DROPS AURICULAR (OTIC) at 09:39

## 2023-05-14 RX ADMIN — OXYMETAZOLINE HYDROCHLORIDE 2 SPRAY: 0.05 SPRAY NASAL at 10:57

## 2023-05-14 NOTE — ED PROVIDER NOTES
History  Chief Complaint   Patient presents with   • Earache     Pt arrives c/o right ear bleeding, I used a q-tip the other day but denies injury      Pt with right ear bleeding starting this morning, pt with some muffled heariing       Earache  Location:  Right  Behind ear:  No abnormality  Quality:  Aching  Severity:  Mild  Onset quality:  Gradual  Duration:  1 day  Timing:  Constant  Progression:  Unable to specify  Chronicity:  New  Context: not direct blow    Relieved by:  Nothing  Worsened by:  Nothing  Ineffective treatments:  None tried  Associated symptoms: no abdominal pain    Risk factors: no recent travel        Prior to Admission Medications   Prescriptions Last Dose Informant Patient Reported? Taking? Biotin 10 MG CAPS   Yes No   Sig: Take 10,000 mcg by mouth   Linaclotide (LINZESS) 290 MCG CAPS   Yes No   Sig: Take 290 mcg by mouth daily as needed   diclofenac sodium (Voltaren) 1 %   No No   Sig: Apply 2 g topically 3 (three) times a day      Facility-Administered Medications: None       History reviewed  No pertinent past medical history  Past Surgical History:   Procedure Laterality Date   • HYSTERECTOMY      For bleeding   • TUBAL LIGATION     • TUBAL LIGATION         Family History   Problem Relation Age of Onset   • Diabetes Father    • Esophageal cancer Paternal Uncle      I have reviewed and agree with the history as documented  E-Cigarette/Vaping     E-Cigarette/Vaping Substances     Social History     Tobacco Use   • Smoking status: Never   • Smokeless tobacco: Never   Substance Use Topics   • Alcohol use: Not Currently     Comment: socially   • Drug use: No       Review of Systems   Constitutional: Negative  HENT: Positive for ear pain  Eyes: Negative  Respiratory: Negative  Cardiovascular: Negative  Gastrointestinal: Negative  Negative for abdominal pain  Endocrine: Negative  Genitourinary: Negative  Musculoskeletal: Negative  Skin: Negative  Allergic/Immunologic: Negative  Neurological: Negative  Hematological: Negative  Psychiatric/Behavioral: Negative  All other systems reviewed and are negative  Physical Exam  Physical Exam  Vitals and nursing note reviewed  Constitutional:       Appearance: Normal appearance  She is normal weight  Comments: Consult ron Cowan to use afrin and ear wick and recheck in office    Minimal drainage post afrin and ear wick    Ace wrap and gauze to right ear    HENT:      Head: Normocephalic and atraumatic  Right Ear: External ear normal       Left Ear: Tympanic membrane, ear canal and external ear normal       Ears:      Comments: Right ear canal minor bleeding  Right tm no perforation seen, + abrasion right ear canal ,  Slight muffled hearing  No cloudiness or erythema to tm      Nose: Nose normal       Mouth/Throat:      Mouth: Mucous membranes are moist       Pharynx: Oropharynx is clear  Eyes:      Extraocular Movements: Extraocular movements intact  Conjunctiva/sclera: Conjunctivae normal       Pupils: Pupils are equal, round, and reactive to light  Cardiovascular:      Rate and Rhythm: Normal rate and regular rhythm  Pulses: Normal pulses  Heart sounds: Normal heart sounds  Pulmonary:      Effort: Pulmonary effort is normal       Breath sounds: Normal breath sounds  Abdominal:      General: Abdomen is flat  Bowel sounds are normal       Palpations: Abdomen is soft  Musculoskeletal:         General: Normal range of motion  Cervical back: Normal range of motion and neck supple  Skin:     General: Skin is warm  Capillary Refill: Capillary refill takes less than 2 seconds  Neurological:      General: No focal deficit present  Mental Status: She is alert     Psychiatric:         Mood and Affect: Mood normal          Vital Signs  ED Triage Vitals [05/14/23 0916]   Temperature Pulse Respirations Blood Pressure SpO2   98 °F (36 7 °C) 98 18 130/64 97 %      Temp Source Heart Rate Source Patient Position - Orthostatic VS BP Location FiO2 (%)   Tympanic Monitor Sitting Left arm --      Pain Score       --           Vitals:    05/14/23 0916   BP: 130/64   Pulse: 98   Patient Position - Orthostatic VS: Sitting         Visual Acuity      ED Medications  Medications   neomycin-polymyxin-hydrocortisone (CORTISPORIN) 0 35%-10,000 units/mL-1% otic suspension 4 drop (4 drops Right Ear Given 5/14/23 0939)   oxymetazoline (AFRIN) 0 05 % nasal spray 2 spray (2 sprays Each Nare Given 5/14/23 1057)       Diagnostic Studies  Results Reviewed     Procedure Component Value Units Date/Time    Protime-INR [[id number]]  (Normal) Collected: 05/14/23 0950    Lab Status: Final result Specimen: Blood from Arm, Left Updated: 05/14/23 1018     Protime 12 9 seconds      INR 0 95    CBC and differential [[id number]]  (Abnormal) Collected: 05/14/23 0950    Lab Status: Final result Specimen: Blood from Arm, Left Updated: 05/14/23 1000     WBC 3 78 Thousand/uL      RBC 3 97 Million/uL      Hemoglobin 12 9 g/dL      Hematocrit 39 0 %      MCV 98 fL      MCH 32 5 pg      MCHC 33 1 g/dL      RDW 12 1 %      MPV 9 9 fL      Platelets 504 Thousands/uL      nRBC 0 /100 WBCs      Neutrophils Relative 53 %      Immat GRANS % 1 %      Lymphocytes Relative 32 %      Monocytes Relative 11 %      Eosinophils Relative 2 %      Basophils Relative 1 %      Neutrophils Absolute 2 05 Thousands/µL      Immature Grans Absolute 0 02 Thousand/uL      Lymphocytes Absolute 1 19 Thousands/µL      Monocytes Absolute 0 40 Thousand/µL      Eosinophils Absolute 0 09 Thousand/µL      Basophils Absolute 0 03 Thousands/µL                  No orders to display              Procedures  Procedures         ED Course                               SBIRT 20yo+    Flowsheet Row Most Recent Value   Initial Alcohol Screen: US AUDIT-C     1  How often do you have a drink containing alcohol? 0 Filed at: 05/14/2023 0918   2   How many drinks containing alcohol do you have on a typical day you are drinking? 0 Filed at: 05/14/2023 0918   3b  FEMALE Any Age, or MALE 65+: How often do you have 4 or more drinks on one occassion? 0 Filed at: 05/14/2023 0918   Audit-C Score 0 Filed at: 05/14/2023 5804   GIAN: How many times in the past year have you    Used an illegal drug or used a prescription medication for non-medical reasons? Never Filed at: 05/14/2023 1396                    MDM    Disposition  Final diagnoses:   Ear canal abrasion, right, initial encounter     Time reflects when diagnosis was documented in both MDM as applicable and the Disposition within this note     Time User Action Codes Description Comment    5/14/2023 11:30 AM Kesha Rob  Add [S00 411A] Ear canal abrasion, right, initial encounter       ED Disposition     ED Disposition   Discharge    Condition   Stable    Date/Time   Sun May 14, 2023 11:29 AM    Comment   Sowmya Connors discharge to home/self care  Follow-up Information     Follow up With Specialties Details Why Wilfredo Giron MD Otolaryngology  call tomorrow for follow up appt [address]  [phone]            Discharge Medication List as of 5/14/2023 11:30 AM      CONTINUE these medications which have NOT CHANGED    Details   Biotin 10 MG CAPS Take 10,000 mcg by mouth, Historical Med      diclofenac sodium (Voltaren) 1 % Apply 2 g topically 3 (three) times a day, Starting Fri 3/27/2020, Normal      Linaclotide (LINZESS) 290 MCG CAPS Take 290 mcg by mouth daily as needed, Starting Tue 8/8/2017, Historical Med             No discharge procedures on file      PDMP Review     None          ED Provider  Electronically Signed by           Heather Townsend PA-C  05/14/23 4005

## 2023-10-02 ENCOUNTER — OCCMED (OUTPATIENT)
Dept: URGENT CARE | Facility: CLINIC | Age: 43
End: 2023-10-02

## 2023-10-02 ENCOUNTER — APPOINTMENT (OUTPATIENT)
Dept: LAB | Facility: CLINIC | Age: 43
End: 2023-10-02

## 2023-10-02 DIAGNOSIS — Z02.1 PHYSICAL EXAM, PRE-EMPLOYMENT: Primary | ICD-10-CM

## 2023-10-02 DIAGNOSIS — Z02.1 PHYSICAL EXAM, PRE-EMPLOYMENT: ICD-10-CM

## 2023-10-02 LAB
MEV IGG SER QL IA: NORMAL
MUV IGG SER QL IA: NORMAL
RUBV IGG SERPL IA-ACNC: 232.6 IU/ML
VZV IGG SER QL IA: NORMAL

## 2023-10-02 PROCEDURE — 86762 RUBELLA ANTIBODY: CPT

## 2023-10-02 PROCEDURE — 86765 RUBEOLA ANTIBODY: CPT

## 2023-10-02 PROCEDURE — 86480 TB TEST CELL IMMUN MEASURE: CPT

## 2023-10-02 PROCEDURE — 36415 COLL VENOUS BLD VENIPUNCTURE: CPT

## 2023-10-02 PROCEDURE — 86735 MUMPS ANTIBODY: CPT

## 2023-10-02 PROCEDURE — 86787 VARICELLA-ZOSTER ANTIBODY: CPT

## 2023-10-03 LAB
GAMMA INTERFERON BACKGROUND BLD IA-ACNC: 0.02 IU/ML
M TB IFN-G BLD-IMP: NEGATIVE
M TB IFN-G CD4+ BCKGRND COR BLD-ACNC: 0.09 IU/ML
M TB IFN-G CD4+ BCKGRND COR BLD-ACNC: 0.13 IU/ML
MITOGEN IGNF BCKGRD COR BLD-ACNC: 9.98 IU/ML

## 2023-11-10 ENCOUNTER — OFFICE VISIT (OUTPATIENT)
Dept: FAMILY MEDICINE CLINIC | Facility: CLINIC | Age: 43
End: 2023-11-10
Payer: COMMERCIAL

## 2023-11-10 ENCOUNTER — LAB (OUTPATIENT)
Dept: LAB | Facility: HOSPITAL | Age: 43
End: 2023-11-10
Payer: COMMERCIAL

## 2023-11-10 VITALS
HEIGHT: 65 IN | OXYGEN SATURATION: 100 % | BODY MASS INDEX: 30.46 KG/M2 | HEART RATE: 73 BPM | WEIGHT: 182.8 LBS | DIASTOLIC BLOOD PRESSURE: 78 MMHG | TEMPERATURE: 97.9 F | SYSTOLIC BLOOD PRESSURE: 100 MMHG

## 2023-11-10 DIAGNOSIS — R73.03 PREDIABETES: ICD-10-CM

## 2023-11-10 DIAGNOSIS — Z00.00 ANNUAL PHYSICAL EXAM: Primary | ICD-10-CM

## 2023-11-10 DIAGNOSIS — R30.0 DYSURIA: ICD-10-CM

## 2023-11-10 DIAGNOSIS — N30.00 ACUTE CYSTITIS WITHOUT HEMATURIA: ICD-10-CM

## 2023-11-10 LAB
EST. AVERAGE GLUCOSE BLD GHB EST-MCNC: 123 MG/DL
HBA1C MFR BLD: 5.9 %
SL AMB  POCT GLUCOSE, UA: 250
SL AMB LEUKOCYTE ESTERASE,UA: 15
SL AMB POCT BILIRUBIN,UA: ABNORMAL
SL AMB POCT BLOOD,UA: ABNORMAL
SL AMB POCT CLARITY,UA: CLEAR
SL AMB POCT COLOR,UA: YELLOW
SL AMB POCT KETONES,UA: ABNORMAL
SL AMB POCT NITRITE,UA: ABNORMAL
SL AMB POCT PH,UA: 5
SL AMB POCT SPECIFIC GRAVITY,UA: 1.01
SL AMB POCT URINE PROTEIN: ABNORMAL
SL AMB POCT UROBILINOGEN: 0.2

## 2023-11-10 PROCEDURE — 81002 URINALYSIS NONAUTO W/O SCOPE: CPT | Performed by: FAMILY MEDICINE

## 2023-11-10 PROCEDURE — 36415 COLL VENOUS BLD VENIPUNCTURE: CPT

## 2023-11-10 PROCEDURE — 83036 HEMOGLOBIN GLYCOSYLATED A1C: CPT

## 2023-11-10 PROCEDURE — 99386 PREV VISIT NEW AGE 40-64: CPT | Performed by: FAMILY MEDICINE

## 2023-11-10 RX ORDER — NITROFURANTOIN 25; 75 MG/1; MG/1
100 CAPSULE ORAL 2 TIMES DAILY
Qty: 10 CAPSULE | Refills: 0 | Status: SHIPPED | OUTPATIENT
Start: 2023-11-10 | End: 2023-11-15

## 2023-11-10 RX ORDER — NITROFURANTOIN 25; 75 MG/1; MG/1
100 CAPSULE ORAL 2 TIMES DAILY
Qty: 10 CAPSULE | Refills: 0 | Status: SHIPPED | OUTPATIENT
Start: 2023-11-10 | End: 2023-11-10 | Stop reason: SDUPTHER

## 2023-11-10 NOTE — PROGRESS NOTES
ADULT ANNUAL PHYSICAL  [address] [facility]    NAME: Harriet Dobson  AGE: 37 y.o. SEX: female  : 1980     DATE: 11/10/2023     Assessment and Plan:     Problem List Items Addressed This Visit        Genitourinary    Acute cystitis without hematuria     Went to urgent care, placed on augmentin 875 BID, symptoms persisted  - macrobid 100mg BID x5 days         Relevant Medications    nitrofurantoin (MACROBID) 100 mg capsule       Other    Annual physical exam - Primary     Normal.  All screenings utd         Prediabetes     Prediabetic, glucose in urine. Check a1c         Relevant Orders    Hemoglobin A1C       Immunizations and preventive care screenings were discussed with patient today. Appropriate education was printed on patient's after visit summary. Counseling:  Alcohol/drug use: discussed moderation in alcohol intake, the recommendations for healthy alcohol use, and avoidance of illicit drug use. Dental Health: discussed importance of regular tooth brushing, flossing, and dental visits. Injury prevention: discussed safety/seat belts, safety helmets, smoke detectors, carbon dioxide detectors, and smoking near bedding or upholstery. Sexual health: discussed sexually transmitted diseases, partner selection, use of condoms, avoidance of unintended pregnancy, and contraceptive alternatives. Exercise: the importance of regular exercise/physical activity was discussed. Recommend exercise 3-5 times per week for at least 30 minutes. BMI Counseling: Body mass index is 30.05 kg/m². The BMI is above normal. Nutrition recommendations include decreasing portion sizes, decreasing fast food intake and limiting drinks that contain sugar. Exercise recommendations include moderate physical activity 150 minutes/week and exercising 3-5 times per week. Rationale for BMI follow-up plan is due to patient being overweight or obese.      Depression Screening and Follow-up Plan: Patient was screened for depression during today's encounter. They screened negative with a PHQ-2 score of 0. No follow-ups on file. Chief Complaint:     Chief Complaint   Patient presents with   • Annual Exam     Pt had sugar in her urine, pre diabetic    • Urinary Tract Infection     X3 days ago, taking azo       History of Present Illness:     Adult Annual Physical   Patient here for a comprehensive physical exam. The patient reports problems - UTi . Diet and Physical Activity  Diet/Nutrition: well balanced diet and consuming 3-5 servings of fruits/vegetables daily. Exercise: no formal exercise. Depression Screening  PHQ-2/9 Depression Screening    Little interest or pleasure in doing things: 0 - not at all  Feeling down, depressed, or hopeless: 0 - not at all  PHQ-2 Score: 0  PHQ-2 Interpretation: Negative depression screen       General Health  Sleep: sleeps well and gets 7-8 hours of sleep on average. Hearing: normal - bilateral.  Vision: no vision problems, goes for regular eye exams, and most recent eye exam <1 year ago. Dental: regular dental visits and brushes teeth twice daily. /GYN Health  Patient is: menopausal  Last menstrual period: 15 years ago  Contraceptive method:  none . Advanced Care Planning  Do you have an advanced directive? no  Do you have a durable medical power of ? no     Review of Systems:     Review of Systems   Constitutional:  Negative for chills and fever. HENT:  Negative for ear pain and sore throat. Eyes:  Negative for pain and visual disturbance. Respiratory:  Negative for cough and shortness of breath. Cardiovascular:  Negative for chest pain and palpitations. Gastrointestinal:  Negative for abdominal pain and vomiting. Endocrine: Positive for polydipsia. Genitourinary:  Positive for decreased urine volume, dysuria and frequency. Negative for hematuria.    Musculoskeletal:  Negative for arthralgias and back pain. Skin:  Negative for color change and rash. Neurological:  Negative for seizures and syncope. All other systems reviewed and are negative. Past Medical History:     History reviewed. No pertinent past medical history. Past Surgical History:     Past Surgical History:   Procedure Laterality Date   • HYSTERECTOMY      For bleeding   • TUBAL LIGATION     • TUBAL LIGATION        Social History:     Social History     Socioeconomic History   • Marital status: Single     Spouse name: None   • Number of children: None   • Years of education: None   • Highest education level: None   Occupational History   • None   Tobacco Use   • Smoking status: Never   • Smokeless tobacco: Never   Vaping Use   • Vaping Use: None   Substance and Sexual Activity   • Alcohol use: Not Currently     Comment: socially   • Drug use: No   • Sexual activity: Yes     Partners: Male   Other Topics Concern   • None   Social History Narrative   • None     Social Determinants of Health     Financial Resource Strain: Not on file   Food Insecurity: Not on file   Transportation Needs: Not on file   Physical Activity: Not on file   Stress: Not on file   Social Connections: Not on file   Intimate Partner Violence: Not on file   Housing Stability: Not on file      Family History:     Family History   Problem Relation Age of Onset   • Diabetes Father       Current Medications:     Current Outpatient Medications   Medication Sig Dispense Refill   • Biotin 10 MG CAPS Take 10,000 mcg by mouth     • diclofenac sodium (Voltaren) 1 % Apply 2 g topically 3 (three) times a day 100 g 0   • Linaclotide (LINZESS) 290 MCG CAPS Take 290 mcg by mouth daily as needed     • nitrofurantoin (MACROBID) 100 mg capsule Take 1 capsule (100 mg total) by mouth 2 (two) times a day for 5 days 10 capsule 0     No current facility-administered medications for this visit. Allergies:      Allergies   Allergen Reactions   • Morphine Hives      Physical Exam:     BP 100/78 (BP Location: Left arm, Patient Position: Sitting, Cuff Size: Adult)   Pulse 73   Temp 97.9 °F (36.6 °C) (Temporal)   Ht 5' 5.4" (1.661 m)   Wt 82.9 kg (182 lb 12.8 oz)   SpO2 100%   BMI 30.05 kg/m²     Physical Exam  Vitals and nursing note reviewed. Constitutional:       General: She is not in acute distress. Appearance: She is well-developed. HENT:      Head: Normocephalic and atraumatic. Eyes:      Conjunctiva/sclera: Conjunctivae normal.   Cardiovascular:      Rate and Rhythm: Normal rate and regular rhythm. Heart sounds: No murmur heard. Pulmonary:      Effort: Pulmonary effort is normal. No respiratory distress. Breath sounds: Normal breath sounds. Abdominal:      Palpations: Abdomen is soft. Tenderness: There is no abdominal tenderness. Musculoskeletal:         General: No swelling. Cervical back: Neck supple. Skin:     General: Skin is warm and dry. Capillary Refill: Capillary refill takes less than 2 seconds. Neurological:      Mental Status: She is alert.    Psychiatric:         Mood and Affect: Mood normal.          Easton Bernal, DO  [employer]

## 2023-11-10 NOTE — PATIENT INSTRUCTIONS
Wellness Visit for Adults   AMBULATORY CARE:   A wellness visit  is when you see your healthcare provider to get screened for health problems. Your healthcare provider will also give you advice on how to stay healthy. Write down your questions so you remember to ask them. Ask your healthcare provider how often you should have a wellness visit. What happens at a wellness visit:  Your healthcare provider will ask about your health, and your family history of health problems. This includes high blood pressure, heart disease, and cancer. He or she will ask if you have symptoms that concern you, if you smoke, and about your mood. You may also be asked about your intake of medicines, supplements, food, and alcohol. Any of the following may be done: Your weight  will be checked. Your height may also be checked so your body mass index (BMI) can be calculated. Your BMI shows if you are at a healthy weight. Your blood pressure  and heart rate will be checked. Your temperature may also be checked. Blood and urine tests  may be done. Blood tests may be done to check your cholesterol levels. Abnormal cholesterol levels increase your risk for heart disease and stroke. You may also need a blood or urine test to check for diabetes if you are at increased risk. Urine tests may be done to look for signs of an infection or kidney disease. A physical exam  includes checking your heartbeat and lungs with a stethoscope. Your healthcare provider may also check your skin to look for sun damage. Screening tests  may be recommended. A screening test is done to check for diseases that may not cause symptoms. The screening tests you may need depend on your age, gender, family history, and lifestyle habits. For example, colorectal screening may be recommended if you are 48years old or older. Screening tests you need if you are a woman:   A Pap smear  is used to screen for cervical cancer.  Pap smears are usually done every 3 to 5 years depending on your age. You may need them more often if you have had abnormal Pap smear test results in the past. Ask your healthcare provider how often you should have a Pap smear. A mammogram  is an x-ray of your breasts to screen for breast cancer. Experts recommend mammograms every 2 years starting at age 48 years. You may need a mammogram at age 52 years or younger if you have an increased risk for breast cancer. Talk to your healthcare provider about when you should start having mammograms and how often you need them. Vaccines you may need:   Get an influenza vaccine  every year. The influenza vaccine protects you from the flu. Several types of viruses cause the flu. The viruses change over time, so new vaccines are made each year. Get a tetanus-diphtheria (Td) booster vaccine  every 10 years. This vaccine protects you against tetanus and diphtheria. Tetanus is a severe infection that may cause painful muscle spasms and lockjaw. Diphtheria is a severe bacterial infection that causes a thick covering in the back of your mouth and throat. Get a human papillomavirus (HPV) vaccine  if you are female and aged 23 to 32 or male 23 to 24 and never received it. This vaccine protects you from HPV infection. HPV is the most common infection spread by sexual contact. HPV may also cause vaginal, penile, and anal cancers. Get a pneumococcal vaccine  if you are aged 72 years or older. The pneumococcal vaccine is an injection given to protect you from pneumococcal disease. Pneumococcal disease is an infection caused by pneumococcal bacteria. The infection may cause pneumonia, meningitis, or an ear infection. Get a shingles vaccine  if you are 60 or older, even if you have had shingles before. The shingles vaccine is an injection to protect you from the varicella-zoster virus. This is the same virus that causes chickenpox.  Shingles is a painful rash that develops in people who had chickenpox or have been exposed to the virus. How to eat healthy:  My Plate is a model for planning healthy meals. It shows the types and amounts of foods that should go on your plate. Fruits and vegetables make up about half of your plate, and grains and protein make up the other half. A serving of dairy is included on the side of your plate. The amount of calories and serving sizes you need depends on your age, gender, weight, and height. Examples of healthy foods are listed below:  Eat a variety of vegetables  such as dark green, red, and orange vegetables. You can also include canned vegetables low in sodium (salt) and frozen vegetables without added butter or sauces. Eat a variety of fresh fruits , canned fruit in 100% juice, frozen fruit, and dried fruit. Include whole grains. At least half of the grains you eat should be whole grains. Examples include whole-wheat bread, wheat pasta, brown rice, and whole-grain cereals such as oatmeal.    Eat a variety of protein foods such as seafood (fish and shellfish), lean meat, and poultry without skin (turkey and chicken). Examples of lean meats include pork leg, shoulder, or tenderloin, and beef round, sirloin, tenderloin, and extra lean ground beef. Other protein foods include eggs and egg substitutes, beans, peas, soy products, nuts, and seeds. Choose low-fat dairy products such as skim or 1% milk or low-fat yogurt, cheese, and cottage cheese. Limit unhealthy fats  such as butter, hard margarine, and shortening. Exercise:  Exercise at least 30 minutes per day on most days of the week. Some examples of exercise include walking, biking, dancing, and swimming. You can also fit in more physical activity by taking the stairs instead of the elevator or parking farther away from stores. Include muscle strengthening activities 2 days each week. Regular exercise provides many health benefits.  It helps you manage your weight, and decreases your risk for type 2 diabetes, heart disease, stroke, and high blood pressure. Exercise can also help improve your mood. Ask your healthcare provider about the best exercise plan for you. General health and safety guidelines:   Do not smoke. Nicotine and other chemicals in cigarettes and cigars can cause lung damage. Ask your healthcare provider for information if you currently smoke and need help to quit. E-cigarettes or smokeless tobacco still contain nicotine. Talk to your healthcare provider before you use these products. Limit alcohol. A drink of alcohol is 12 ounces of beer, 5 ounces of wine, or 1½ ounces of liquor. Lose weight, if needed. Being overweight increases your risk of certain health conditions. These include heart disease, high blood pressure, type 2 diabetes, and certain types of cancer. Protect your skin. Do not sunbathe or use tanning beds. Use sunscreen with a SPF 15 or higher. Apply sunscreen at least 15 minutes before you go outside. Reapply sunscreen every 2 hours. Wear protective clothing, hats, and sunglasses when you are outside. Drive safely. Always wear your seatbelt. Make sure everyone in your car wears a seatbelt. A seatbelt can save your life if you are in an accident. Do not use your cell phone when you are driving. This could distract you and cause an accident. Pull over if you need to make a call or send a text message. Practice safe sex. Use latex condoms if are sexually active and have more than one partner. Your healthcare provider may recommend screening tests for sexually transmitted infections (STIs). Wear helmets, lifejackets, and protective gear. Always wear a helmet when you ride a bike or motorcycle, go skiing, or play sports that could cause a head injury. Wear protective equipment when you play sports. Wear a lifejacket when you are on a boat or doing water sports.     © Copyright Lucina Handy 2023 Information is for End User's use only and may not be sold, redistributed or otherwise used for commercial purposes. The above information is an  only. It is not intended as medical advice for individual conditions or treatments. Talk to your doctor, nurse or pharmacist before following any medical regimen to see if it is safe and effective for you.

## 2023-12-15 ENCOUNTER — OFFICE VISIT (OUTPATIENT)
Dept: URGENT CARE | Age: 43
End: 2023-12-15
Payer: COMMERCIAL

## 2023-12-15 VITALS — OXYGEN SATURATION: 99 % | RESPIRATION RATE: 18 BRPM | TEMPERATURE: 98.2 F | HEART RATE: 89 BPM

## 2023-12-15 DIAGNOSIS — J02.9 SORE THROAT: Primary | ICD-10-CM

## 2023-12-15 PROCEDURE — 87880 STREP A ASSAY W/OPTIC: CPT

## 2023-12-15 PROCEDURE — 99213 OFFICE O/P EST LOW 20 MIN: CPT

## 2023-12-15 RX ORDER — GABAPENTIN 300 MG/1
300 CAPSULE ORAL
COMMUNITY
Start: 2023-07-13 | End: 2024-07-12

## 2023-12-15 NOTE — PROGRESS NOTES
[facility]        NAME: Asim Cobb is a 37 y.o. female  : 1980    MRN: [id number]  DATE: 2023  TIME: 9:34 AM    Assessment and Plan   Sore throat [J02.9]  1. Sore throat  POCT rapid ANTIGEN strepA        Pt presents for eval of sore throat, congestion, PND. No fevers. No meds taken. Ear pain / body aches at times. Rapid strep negative. No tonsillar swelling/erythema. Discussed symptom management. Patient Instructions       Follow up with PCP as needed  Chief Complaint     Chief Complaint   Patient presents with    Fever     Sore throat, ear pain, body aches for 2 days. History of Present Illness       Pt presents for eval of sore throat, congestion, PND. No fevers. No meds taken. Ear pain / body aches at times. Rapid strep negative. No tonsillar swelling/erythema. Discussed symptom management. Fever  Associated symptoms include congestion and a sore throat. Pertinent negatives include no coughing. Review of Systems   Review of Systems   Constitutional:  Negative for activity change. HENT:  Positive for congestion, postnasal drip and sore throat. Respiratory:  Negative for cough, shortness of breath and wheezing.           Current Medications       Current Outpatient Medications:     Biotin 10 MG CAPS, Take 10,000 mcg by mouth, Disp: , Rfl:     diclofenac sodium (Voltaren) 1 %, Apply 2 g topically 3 (three) times a day, Disp: 100 g, Rfl: 0    gabapentin (NEURONTIN) 300 mg capsule, Take 300 mg by mouth, Disp: , Rfl:     Linaclotide (LINZESS) 290 MCG CAPS, Take 290 mcg by mouth daily as needed, Disp: , Rfl:     Current Allergies     Allergies as of 12/15/2023 - Reviewed 12/15/2023   Allergen Reaction Noted    Morphine Hives 2018            The following portions of the patient's history were reviewed and updated as appropriate: allergies, current medications, past family history, past medical history, past social history, past surgical history and problem list.     No past medical history on file. Past Surgical History:   Procedure Laterality Date    HYSTERECTOMY      For bleeding    TUBAL LIGATION      TUBAL LIGATION         Family History   Problem Relation Age of Onset    Diabetes Father          Medications have been verified. Objective   Pulse 89   Temp 98.2 °F (36.8 °C)   Resp 18   SpO2 99%   No LMP recorded. Patient has had a hysterectomy. Physical Exam     Physical Exam  Vitals reviewed. Constitutional:       Appearance: Normal appearance. HENT:      Nose: Congestion present. Mouth/Throat:      Pharynx: Posterior oropharyngeal erythema present. Cardiovascular:      Rate and Rhythm: Normal rate and regular rhythm. Pulses: Normal pulses. Heart sounds: Normal heart sounds. Lymphadenopathy:      Cervical: No cervical adenopathy. Neurological:      Mental Status: She is alert.

## 2023-12-16 LAB — S PYO AG THROAT QL: NEGATIVE

## 2024-01-09 PROBLEM — N30.00 ACUTE CYSTITIS WITHOUT HEMATURIA: Status: RESOLVED | Noted: 2023-11-10 | Resolved: 2024-01-09

## 2024-01-25 ENCOUNTER — OFFICE VISIT (OUTPATIENT)
Dept: OBGYN CLINIC | Facility: CLINIC | Age: 44
End: 2024-01-25

## 2024-01-25 VITALS
HEIGHT: 65 IN | SYSTOLIC BLOOD PRESSURE: 102 MMHG | DIASTOLIC BLOOD PRESSURE: 62 MMHG | WEIGHT: 182 LBS | BODY MASS INDEX: 30.32 KG/M2

## 2024-01-25 DIAGNOSIS — G56.01 CARPAL TUNNEL SYNDROME ON RIGHT: Primary | ICD-10-CM

## 2024-01-25 RX ORDER — BETAMETHASONE SODIUM PHOSPHATE AND BETAMETHASONE ACETATE 3; 3 MG/ML; MG/ML
6 INJECTION, SUSPENSION INTRA-ARTICULAR; INTRALESIONAL; INTRAMUSCULAR; SOFT TISSUE
Status: COMPLETED | OUTPATIENT
Start: 2024-01-25 | End: 2024-01-25

## 2024-01-25 RX ORDER — NAPROXEN 500 MG/1
500 TABLET ORAL 2 TIMES DAILY PRN
COMMUNITY
Start: 2023-04-19 | End: 2024-04-18

## 2024-01-25 RX ORDER — LIDOCAINE HYDROCHLORIDE 10 MG/ML
1 INJECTION, SOLUTION INFILTRATION; PERINEURAL
Status: COMPLETED | OUTPATIENT
Start: 2024-01-25 | End: 2024-01-25

## 2024-01-25 RX ORDER — GABAPENTIN 300 MG/1
300 CAPSULE ORAL
Qty: 30 CAPSULE | Refills: 1 | Status: SHIPPED | OUTPATIENT
Start: 2024-01-25 | End: 2024-03-25

## 2024-01-25 RX ORDER — MELOXICAM 15 MG/1
15 TABLET ORAL DAILY
Qty: 30 TABLET | Refills: 0 | Status: SHIPPED | OUTPATIENT
Start: 2024-01-25

## 2024-01-25 RX ADMIN — LIDOCAINE HYDROCHLORIDE 1 ML: 10 INJECTION, SOLUTION INFILTRATION; PERINEURAL at 08:30

## 2024-01-25 RX ADMIN — BETAMETHASONE SODIUM PHOSPHATE AND BETAMETHASONE ACETATE 6 MG: 3; 3 INJECTION, SUSPENSION INTRA-ARTICULAR; INTRALESIONAL; INTRAMUSCULAR; SOFT TISSUE at 08:30

## 2024-01-25 NOTE — PROGRESS NOTES
"Patient Name:  Bisi Orozco  MRN:  [id number]    Assessment & Plan     Right carpal tunnel syndrome  Corticosteroid injection performed today into the right carpal tunnel.  Continue night splinting.  Patient was previously taking meloxicam and gabapentin.  Refills provided today at patient's request.  Patient wishes to follow-up in 3 months with one of our hand specialists.    Chief Complaint     Right hand numbness and tingling    History of the Present Illness     Bisi Orozco is a 43 y.o. right-hand-dominant female who reports to the office today for evaluation of her right hand.  She noted an onset of numbness and tingling over a year ago.  She denies any injury or trauma.  She was initially evaluated at [facility] and diagnosed with carpal tunnel syndrome.  Prior treatment includes meloxicam, gabapentin, night splinting, physical therapy and carpal tunnel corticosteroid injection on 9/21/2023.  She noted significant treatment for approximately 3 months after an injection but notes a return of her symptoms.  She notes numbness and tingling primarily in the thumb index and long fingers.  Numbness and tingling occurs at work.  She currently works at the [employer].  She also notes numbness and tingling at night which causes her to wake up.  She notes weakness but denies dropping objects.  She has difficulty with fine dexterity tasks.  No fevers or chills.  She is interested in repeat corticosteroid injection today into the carpal tunnel.    Physical Exam     /62   Ht 5' 5.4\" (1.661 m)   Wt 82.6 kg (182 lb)   BMI 29.92 kg/m²     Right hand: No gross deformity.  Skin intact.  No erythema ecchymosis or swelling.  No thenar atrophy.  Positive Tinel's and Durkan's compression test about the carpal tunnel.  Full wrist range of motion without pain.  Full composite fist formation without pain.  Sensation intact but diminished in the median nerve distribution.  Sensation intact ulnar " and radial nerves.  2+ radial pulse.  5 out of 5 APB strength.    Eyes: Anicteric sclerae.  ENT: Trachea midline.  Lungs: Normal respiratory effort.  CV: Capillary refill is less than 2 seconds.  Skin: Intact without erythema.  Lymph: No palpable lymphadenopathy.  Neuro: Sensation is grossly intact to light touch.  Psych: Mood and affect are appropriate.    Data Review     I have personally reviewed pertinent films in PACS, and my interpretation follows:    EMG right upper extremity 9/18/2023 performed at [facility] reveals moderate carpal tunnel syndrome    History reviewed. No pertinent past medical history.    Past Surgical History:   Procedure Laterality Date    HYSTERECTOMY      For bleeding    TUBAL LIGATION      TUBAL LIGATION         Allergies   Allergen Reactions    Morphine Hives       Current Outpatient Medications on File Prior to Visit   Medication Sig Dispense Refill    Biotin 10 MG CAPS Take 10,000 mcg by mouth      diclofenac sodium (Voltaren) 1 % Apply 2 g topically 3 (three) times a day 100 g 0    Linaclotide (LINZESS) 290 MCG CAPS Take 290 mcg by mouth daily as needed      naproxen (NAPROSYN) 500 mg tablet Take 500 mg by mouth 2 (two) times a day as needed      [DISCONTINUED] gabapentin (NEURONTIN) 300 mg capsule Take 300 mg by mouth       No current facility-administered medications on file prior to visit.       Social History     Tobacco Use    Smoking status: Never    Smokeless tobacco: Never   Substance Use Topics    Alcohol use: Not Currently     Comment: socially    Drug use: No       Family History   Problem Relation Age of Onset    Diabetes Father        Review of Systems     As stated in the HPI. All other systems reviewed and are negative.      Hand/upper extremity injection: R carpal tunnel  Procedure Details  Condition:carpal tunnel syndrome Site: R carpal tunnel   Needle size: 25 G  Ultrasound guidance: no  Approach: volar  Medications administered: 1 mL lidocaine 1 %; 6 mg  betamethasone acetate-betamethasone sodium phosphate 6 (3-3) mg/mL  Patient tolerance: patient tolerated the procedure well with no immediate complications  Dressing:  Sterile dressing applied

## 2024-03-07 ENCOUNTER — OFFICE VISIT (OUTPATIENT)
Dept: DERMATOLOGY | Facility: CLINIC | Age: 44
End: 2024-03-07
Payer: COMMERCIAL

## 2024-03-07 VITALS — HEIGHT: 61 IN | WEIGHT: 178 LBS | TEMPERATURE: 98.6 F | BODY MASS INDEX: 33.61 KG/M2

## 2024-03-07 DIAGNOSIS — L63.9 ALOPECIA AREATA: Primary | ICD-10-CM

## 2024-03-07 PROCEDURE — 99204 OFFICE O/P NEW MOD 45 MIN: CPT | Performed by: DERMATOLOGY

## 2024-03-07 PROCEDURE — 11900 INJECT SKIN LESIONS </W 7: CPT | Performed by: DERMATOLOGY

## 2024-03-07 RX ORDER — CLOBETASOL PROPIONATE 0.5 MG/G
CREAM TOPICAL 2 TIMES DAILY
Qty: 60 G | Refills: 3 | Status: SHIPPED | OUTPATIENT
Start: 2024-03-07

## 2024-03-07 NOTE — PATIENT INSTRUCTIONS
ALOPECIA AREATA OPHFIS PATTERN    Physical Exam:  Anatomic Location Affected:  super auricular temple by hairline isolated hair preserve by haitr exclamation hair follicles  Morphological Description:    No perifollicular erythema or scale.  Pertinent Positives:  Pertinent Negatives: Patient does has liner  eyebrows         Assessment and Plan:  - History and physical consistent with alopecia areata   - Educated that this is an autoimmune disease resulting in non-scarring hair loss  - Discussed the natural history of alopecia areata with most patients demonstrating limited areas of involvement of the scalp, eyebrows, eyelashes or body hair but that rarely alopecia areata can progress to alopecia totalis (complete loss of hair on the scalp) or alopecia universalis (complete loss of hair on the scalp and body).   - Educated that 50% of patients with limited hair loss will recover within a year but many will experience multiple episodes.  - Educated that in some cases hair loss does not respond to treatments.  - Treatment options including topical steroids, intralesional steroids, mini pulse oral steroids, TOMAS inhibitors, minoxidil, including the benefits and risks.  - Based on a thorough discussion of this condition and the management approach to it (including a comprehensive discussion of the known risks, side effects and potential benefits of treatment), the patient (family) agrees to implement the following specific plan:    Kenalog treatment done today in office as below  START topical clobetasol 0.05% soln nightly to areas of hair loss    Discussed Litfulo if ILK does not work  Follow up one month       PROCEDURE:  INTRALESIONAL STEROID INJECTION (KENALOG INJECTION FOR ALOPECIA AREATA)    Purpose: Triamcinolone is a synthetic glucocorticoid corticosteroid that has marked anti-inflammatory action. It is prepared in sterile aqueous suspension suitable for injecting directly into a lesion on or immediately below the  skin to treat a dermal inflammatory process.     Indications: It is indicated for alopecia areata; inflammatory acne cysts; discoid lupus erythematosus; keloids and hypertrophic scars; inflammatory lesions of granuloma annulare, lichen planus, lichen simplex chronicus (neurodermatitis), psoriatic plaques, and other localized inflammatory skin conditions.     References: Based on a recent publication in [employer] (PMID [id number]), Dr. Schroeder treats patients with Alopecia Areata with kenalog diluted with lidocaine plus epinephrine to the appropriate concentration. The use of lidocaine plus epinephrine over saline as the diluent appears to have additional benefits and has been shown to increase efficacy of treatment.    Potential Side Effects: I understand that triamcinolone injection can potentially cause early and/or delayed adverse effects such as:      Pain    Impaired wound healing    Increased hair growth    Bleeding    White or brown marks    Steroid acne    Infection    Telangiectasia    Skin thinning    Cutaneous and subcutaneous lipoatrophy (most common) appearing as skin indentations or dimples around the injection sites a few weeks after treatment

## 2024-03-07 NOTE — PROGRESS NOTES
"[facility] Note     Patient Name: Bisi Orozco  Encounter Date: 3/7/24     Have you been cared for by a [facility] Dermatologist in the last 3 years and, if so, which description applies to you?    NO.   I am considered a \"new\" patient and must complete all patient intake questions. I am FEMALE/of child-bearing potential.    REVIEW OF SYSTEMS:  Have you recently had or currently have any of the following? Recent fever or chills? No  Any non-healing wound? No  Are you pregnant or planning to become pregnant? No  Are you currently or planning to be nursing or breast feeding? No   PAST MEDICAL HISTORY:  Have you personally ever had or currently have any of the following?  If \"YES,\" then please provide more detail. Skin cancer (such as Melanoma, Basal Cell Carcinoma, Squamous Cell Carcinoma?  No  Tuberculosis, HIV/AIDS, Hepatitis B or C: No  Radiation Treatment NO   HISTORY OF IMMUNOSUPPRESSION:   Do you have a history of any of the following:  Systemic Immunosuppression such as Diabetes, Biologic or Immunotherapy, Chemotherapy, Organ Transplantation, Bone Marrow Transplantation?  YES, pre diabetic    Answering \"YES\" requires the addition of the dotphrase \"IMMUNOSUPPRESSED\" as the first diagnosis of the patient's visit.   FAMILY HISTORY:  Any \"first degree relatives\" (parent, brother, sister, or child) with the following?    Skin Cancer, Pancreatic or Other Cancer? No   PATIENT EXPERIENCE:    Do you want the Dermatologist to perform a COMPLETE skin exam today including a clinical examination under the \"bra and underwear\" areas?  NO  If necessary, do we have your permission to call and leave a detailed message on your Preferred Phone number that includes your specific medical information?  Yes      Allergies   Allergen Reactions   • Morphine Hives      Current Outpatient Medications:   •  clobetasol (TEMOVATE) 0.05 % cream, Apply topically 2 (two) times a day Apply twice a day, Disp: 60 g, Rfl: 3  • "  gabapentin (NEURONTIN) 300 mg capsule, Take 1 capsule (300 mg total) by mouth daily at bedtime, Disp: 30 capsule, Rfl: 1  •  Linaclotide (LINZESS) 290 MCG CAPS, Take 290 mcg by mouth daily as needed, Disp: , Rfl:   •  meloxicam (Mobic) 15 mg tablet, Take 1 tablet (15 mg total) by mouth daily, Disp: 30 tablet, Rfl: 0  No current facility-administered medications for this visit.          Whom besides the patient is providing clinical information about today's encounter?   NO ADDITIONAL HISTORIAN (patient alone provided history)    Physical Exam and Assessment/Plan by Diagnosis:     ALOPECIA AREATA Ophiasis PATTERN    Physical Exam:  Anatomic Location Affected:  super auricular temple by hairline isolated hair preserve by haitr exclamation hair follicles  Morphological Description:    No perifollicular erythema or scale.  Pertinent Positives:  Pertinent Negatives: Patient does has liner  eyebrows         Assessment and Plan:  - History and physical consistent with alopecia areata   - Educated that this is an autoimmune disease resulting in non-scarring hair loss  - Discussed the natural history of alopecia areata with most patients demonstrating limited areas of involvement of the scalp, eyebrows, eyelashes or body hair but that rarely alopecia areata can progress to alopecia totalis (complete loss of hair on the scalp) or alopecia universalis (complete loss of hair on the scalp and body).   - Educated that 50% of patients with limited hair loss will recover within a year but many will experience multiple episodes.  - Educated that in some cases hair loss does not respond to treatments.  - Treatment options including topical steroids, intralesional steroids, mini pulse oral steroids, TOMAS inhibitors, minoxidil, including the benefits and risks.  - Based on a thorough discussion of this condition and the management approach to it (including a comprehensive discussion of the known risks, side effects and potential  benefits of treatment), the patient (family) agrees to implement the following specific plan:    Kenalog treatment done today in office as below  START topical clobetasol 0.05% soln nightly to areas of hair loss  Denies history of diverticulitis, low white blood cell count, liver dysfunction, active infection, untreated lipid abnormalities, known malignancy.   Discussed Litfulo if ILK does not work  Follow up one month         PROCEDURE:  INTRALESIONAL STEROID INJECTION (KENALOG INJECTION FOR ALOPECIA AREATA)    Purpose: Triamcinolone is a synthetic glucocorticoid corticosteroid that has marked anti-inflammatory action. It is prepared in sterile aqueous suspension suitable for injecting directly into a lesion on or immediately below the skin to treat a dermal inflammatory process.     Indications: It is indicated for alopecia areata; inflammatory acne cysts; discoid lupus erythematosus; keloids and hypertrophic scars; inflammatory lesions of granuloma annulare, lichen planus, lichen simplex chronicus (neurodermatitis), psoriatic plaques, and other localized inflammatory skin conditions.     References: Based on a recent publication in [employer] (PMID [id number]), Dr. Schroeder treats patients with Alopecia Areata with kenalog diluted with lidocaine plus epinephrine to the appropriate concentration. The use of lidocaine plus epinephrine over saline as the diluent appears to have additional benefits and has been shown to increase efficacy of treatment.    Potential Side Effects: I understand that triamcinolone injection can potentially cause early and/or delayed adverse effects such as:   • Pain   • Impaired wound healing   • Increased hair growth   • Bleeding   • White or brown marks   • Steroid acne   • Infection scalp  • Telangiectasia   • Skin thinning   • Cutaneous and subcutaneous lipoatrophy (most common) appearing as skin indentations or dimples around the injection sites a few weeks after treatment     PROCEDURE  NOTE:  After verbal and written consent were obtained, the to-be-treated area was wiped and cleaned with rubbing alcohol 70%.      Then, a total of 2.5  mL of Kenalog CONCENTRATION:  10 mg/mL diluted with 0.9% Sodium Chloride a final concentration of 2.5 mg/mL    (Lot# [id number]; Expiration 12/31/2025, NDC#: 4089-7161-20) was injected intralesionally into a total of 2.0cc  lesion/s on the following anatomic areas:  scalp  using a (2) 1-mL syringe and a 30 1/2-gauge needle.      There was less than 1 mL of blood loss and little to no discomfort.  The area was bandaged with a Band-aid.  The patient tolerated the procedure well and remained in the office for observation.  With no signs of an adverse reaction, the patient was eventually discharged from clinic.       Scribe Attestation    I,:  Santa Lara MA am acting as a scribe while in the presence of the attending physician.:       I,:  José Miguel Rivera MD personally performed the services described in this documentation    as scribed in my presence.:

## 2024-03-13 ENCOUNTER — TELEPHONE (OUTPATIENT)
Dept: FAMILY MEDICINE CLINIC | Facility: CLINIC | Age: 44
End: 2024-03-13

## 2024-03-19 ENCOUNTER — TELEPHONE (OUTPATIENT)
Age: 44
End: 2024-03-19

## 2024-03-19 NOTE — TELEPHONE ENCOUNTER
Received call from patient asking if we has any cancellations.     At the time of this call there were not cancelled apptointments

## 2024-03-21 ENCOUNTER — OFFICE VISIT (OUTPATIENT)
Dept: URGENT CARE | Age: 44
End: 2024-03-21
Payer: COMMERCIAL

## 2024-03-21 VITALS
SYSTOLIC BLOOD PRESSURE: 115 MMHG | OXYGEN SATURATION: 98 % | RESPIRATION RATE: 18 BRPM | TEMPERATURE: 97.2 F | HEART RATE: 76 BPM | DIASTOLIC BLOOD PRESSURE: 75 MMHG

## 2024-03-21 DIAGNOSIS — R30.0 BURNING WITH URINATION: ICD-10-CM

## 2024-03-21 DIAGNOSIS — N30.91 CYSTITIS WITH HEMATURIA: Primary | ICD-10-CM

## 2024-03-21 LAB
SL AMB  POCT GLUCOSE, UA: 1000
SL AMB LEUKOCYTE ESTERASE,UA: ABNORMAL
SL AMB POCT BILIRUBIN,UA: NEGATIVE
SL AMB POCT BLOOD,UA: ABNORMAL
SL AMB POCT CLARITY,UA: ABNORMAL
SL AMB POCT COLOR,UA: ABNORMAL
SL AMB POCT KETONES,UA: NEGATIVE
SL AMB POCT NITRITE,UA: POSITIVE
SL AMB POCT PH,UA: 5
SL AMB POCT SPECIFIC GRAVITY,UA: 1.01
SL AMB POCT URINE PROTEIN: NEGATIVE
SL AMB POCT UROBILINOGEN: 0.2

## 2024-03-21 PROCEDURE — 99213 OFFICE O/P EST LOW 20 MIN: CPT

## 2024-03-21 PROCEDURE — 87086 URINE CULTURE/COLONY COUNT: CPT

## 2024-03-21 PROCEDURE — 81002 URINALYSIS NONAUTO W/O SCOPE: CPT

## 2024-03-21 PROCEDURE — 87077 CULTURE AEROBIC IDENTIFY: CPT

## 2024-03-21 PROCEDURE — 87186 SC STD MICRODIL/AGAR DIL: CPT

## 2024-03-21 RX ORDER — NITROFURANTOIN 25; 75 MG/1; MG/1
100 CAPSULE ORAL 2 TIMES DAILY
Qty: 10 CAPSULE | Refills: 0 | Status: SHIPPED | OUTPATIENT
Start: 2024-03-21 | End: 2024-03-26

## 2024-03-21 NOTE — PROGRESS NOTES
[facility]        NAME: Bisi Orozco is a 43 y.o. female  : 1980    MRN: [id number]  DATE: 2024  TIME: 3:31 PM    Assessment and Plan   Cystitis with hematuria [N30.91]  1. Cystitis with hematuria  nitrofurantoin (MACROBID) 100 mg capsule      2. Burning with urination  POCT urine dip    Urine culture    Urine culture        Pt presents with UTI symptoms- no flank pain or nausea. No fevers. Dip notes leuks and trace blood.     Patient Instructions       Follow up with PCP as needed.    If tests have been performed at [facility], our office will contact you with results if changes need to be made to the care plan discussed with you at the visit.  You can review your full results on [facility] MyChart.    Chief Complaint     Chief Complaint   Patient presents with    Possible UTI     Pt c/o urinary urgency and burning. Sx started yesterday. Took azo around 0500 today.          History of Present Illness       Pt presents with UTI symptoms- no flank pain or nausea. No fevers. Dip notes leuks and trace blood.         Review of Systems   Review of Systems   Constitutional:  Negative for activity change, chills and fever.   HENT:  Negative for congestion, postnasal drip and sore throat.    Respiratory:  Negative for cough, shortness of breath and wheezing.    Cardiovascular:  Negative for chest pain.   Gastrointestinal:  Positive for abdominal pain (suprapubic pain). Negative for abdominal distention, nausea and vomiting.   Genitourinary:  Positive for dysuria and frequency. Negative for flank pain and hematuria.   Musculoskeletal:  Negative for back pain.   Neurological:  Negative for dizziness, syncope, light-headedness and headaches.   Psychiatric/Behavioral:  Negative for agitation and confusion.    All other systems reviewed and are negative.        Current Medications       Current Outpatient Medications:     gabapentin (NEURONTIN) 300 mg capsule, Take 1 capsule (300 mg total) by mouth  daily at bedtime, Disp: 30 capsule, Rfl: 1    Linaclotide (LINZESS) 290 MCG CAPS, Take 290 mcg by mouth daily as needed, Disp: , Rfl:     meloxicam (Mobic) 15 mg tablet, Take 1 tablet (15 mg total) by mouth daily, Disp: 30 tablet, Rfl: 0    nitrofurantoin (MACROBID) 100 mg capsule, Take 1 capsule (100 mg total) by mouth 2 (two) times a day for 5 days, Disp: 10 capsule, Rfl: 0    clobetasol (TEMOVATE) 0.05 % cream, Apply topically 2 (two) times a day Apply twice a day (Patient not taking: Reported on 3/21/2024), Disp: 60 g, Rfl: 3    Current Allergies     Allergies as of 03/21/2024 - Reviewed 03/21/2024   Allergen Reaction Noted    Morphine Hives 03/27/2018            The following portions of the patient's history were reviewed and updated as appropriate: allergies, current medications, past family history, past medical history, past social history, past surgical history and problem list.     No past medical history on file.    Past Surgical History:   Procedure Laterality Date    HYSTERECTOMY      For bleeding    TUBAL LIGATION      TUBAL LIGATION         Family History   Problem Relation Age of Onset    Diabetes Father          Medications have been verified.        Objective   /75   Pulse 76   Temp (!) 97.2 °F (36.2 °C) (Tympanic)   Resp 18   SpO2 98%   No LMP recorded. Patient has had a hysterectomy.       Physical Exam     Physical Exam  Vitals reviewed.   Constitutional:       General: She is not in acute distress.     Appearance: Normal appearance. She is not ill-appearing.   HENT:      Head: Normocephalic and atraumatic.   Eyes:      Extraocular Movements: Extraocular movements intact.      Conjunctiva/sclera: Conjunctivae normal.   Cardiovascular:      Rate and Rhythm: Normal rate and regular rhythm.      Pulses: Normal pulses.      Heart sounds: Normal heart sounds. No murmur heard.  Pulmonary:      Effort: Pulmonary effort is normal. No respiratory distress.      Breath sounds: Normal breath  sounds.   Abdominal:      General: Bowel sounds are normal.      Tenderness: There is abdominal tenderness (suprapubic). There is no right CVA tenderness or left CVA tenderness.   Musculoskeletal:      Cervical back: Normal range of motion.   Skin:     General: Skin is warm.   Neurological:      General: No focal deficit present.      Mental Status: She is alert.   Psychiatric:         Mood and Affect: Mood normal.         Behavior: Behavior normal.         Judgment: Judgment normal.

## 2024-03-23 ENCOUNTER — NURSE TRIAGE (OUTPATIENT)
Dept: OTHER | Facility: OTHER | Age: 44
End: 2024-03-23

## 2024-03-23 LAB — BACTERIA UR CULT: ABNORMAL

## 2024-03-23 NOTE — TELEPHONE ENCOUNTER
"Regarding: Burning/ Frequent urination  ----- Message from Belle Sampson sent at 3/23/2024 10:01 AM EDT -----  Pt stated, \" I was seen on Thursday at urgent care for a UTI which was positive.  They gave me pills but I am still feeling the same symptoms, I now also have burning.\"    "

## 2024-03-23 NOTE — TELEPHONE ENCOUNTER
"Answer Assessment - Initial Assessment Questions  1. SYMPTOM: \"What's the main symptom you're concerned about?\" (e.g., frequency, incontinence)      Worsening of burning with urination and lower abdominal burning sensation     2. ONSET: \"When did the symptoms start?\"      Wednesday    3. PAIN: \"Is there any pain?\" If Yes, ask: \"How bad is it?\" (Scale: 1-10; mild, moderate, severe)      Mild burning sensation    4. CAUSE: \"What do you think is causing the symptoms?\"      UTI    5. OTHER SYMPTOMS: \"Do you have any other symptoms?\" (e.g., fever, flank pain, blood in urine, pain with urination)      Burning with urination and lower abdominal burning sensation    Protocols used: Urinary Symptoms-ADULT-    "

## 2024-03-23 NOTE — TELEPHONE ENCOUNTER
On call provider paged. [place] [facility] [place]. Pt verbalizes understanding.

## 2024-03-23 NOTE — TELEPHONE ENCOUNTER
Reason for Disposition  • [1] Taking antibiotic > 72 hours (3 days) for UTI AND [2] painful urination or frequency not improved    Additional Information  • [1] Taking antibiotic for urinary tract infection (UTI) AND [2] female    Protocols used: Urinary Symptoms-ADULT-, Urinary Tract Infection on Antibiotic Follow-up Call - Female-ADULT-

## 2024-03-26 ENCOUNTER — OFFICE VISIT (OUTPATIENT)
Dept: BARIATRICS | Facility: CLINIC | Age: 44
End: 2024-03-26
Payer: COMMERCIAL

## 2024-03-26 VITALS
RESPIRATION RATE: 16 BRPM | BODY MASS INDEX: 29.23 KG/M2 | HEART RATE: 71 BPM | WEIGHT: 171.2 LBS | TEMPERATURE: 97.4 F | HEIGHT: 64 IN | SYSTOLIC BLOOD PRESSURE: 110 MMHG | DIASTOLIC BLOOD PRESSURE: 70 MMHG

## 2024-03-26 DIAGNOSIS — R73.03 PREDIABETES: ICD-10-CM

## 2024-03-26 DIAGNOSIS — E66.9 OBESITY, CLASS I, BMI 30-34.9: Primary | ICD-10-CM

## 2024-03-26 PROBLEM — Z00.00 ANNUAL PHYSICAL EXAM: Status: RESOLVED | Noted: 2023-11-10 | Resolved: 2024-03-26

## 2024-03-26 PROBLEM — E66.811 OBESITY, CLASS I, BMI 30-34.9: Status: ACTIVE | Noted: 2024-03-26

## 2024-03-26 PROCEDURE — 99244 OFF/OP CNSLTJ NEW/EST MOD 40: CPT | Performed by: INTERNAL MEDICINE

## 2024-03-26 RX ORDER — TIRZEPATIDE 2.5 MG/.5ML
2.5 INJECTION, SOLUTION SUBCUTANEOUS WEEKLY
Qty: 2 ML | Refills: 0 | Status: SHIPPED | OUTPATIENT
Start: 2024-03-26 | End: 2024-04-23

## 2024-03-26 NOTE — PROGRESS NOTES
Assessment/Plan:  Bisi was seen today for consult.    Diagnoses and all orders for this visit:    Obesity, Class I, BMI 30-34.9  -     tirzepatide (Zepbound) 2.5 mg/0.5 mL auto-injector; Inject 0.5 mL (2.5 mg total) under the skin once a week for 28 days    Prediabetes  -     Ambulatory Referral to Weight Management       - Discussed options of HealthyCORE-Intensive Lifestyle Intervention Program, Very Low Calorie Diet-VLCD, and Conservative Program and the role of weight loss medications.  - Explained the importance of making lifestyle changes first before starting anti-obesity medications.  - Patient should demonstrate lifestyle changes first before anti-obesity medication initiated.   - Patient is interested in pursuing Conservative Program  - Initial weight loss goal of 5-10% weight loss for improved health as studies have shown this is where we see the greatest impact on improving health and decreasing risk of obesity related conditions.  - Weight loss can improve patient's co-morbid conditions and/or prevent weight-related complications.  - Labs reviewed: As below.      General Recommendations:  Nutrition:  Eat breakfast daily.  Do not skip meals.     Food log (ie.) www.Curemark.com, sparkpeople.com, loseit.com, calorieking.com, etc.    Practice mindful eating.  Be sure to set aside time to eat, eat slowly, and savor your food.    Hydration:    At least 64oz of water daily.  No sugar sweetened beverages.  No juice (eat the fruit instead).    Exercise:  Studies have shown that the ideal exercise goal is somewhere between 150 to 300 minutes of moderate intensity exercise a week.  Start with exercising 10 minutes every other day and gradually increase physical activity with a goal of at least 150 minutes of moderate intensity exercise a week, divided over at least 3 days a week.  An example of this would be exercising 30 minutes a day, 5 days a week.  Resistance training can increase muscle mass and increase  our resting metabolic rate.   FULL BODY resistance training is recommended 2-3 times a week.  Do not do this on consecutive days to allow for muscle recovery.    Aim for a bare minimum 5000 steps, even on days you do not exercise.    Monitoring:   Weigh yourself daily.  If this causes undue stress, then just weigh yourself once a week.  Weigh yourself the same time of the day with the same amount of clothing on.  Preferably this should be done after waking up, before you eat, and with no clothing or minimal clothing on.    Specific Goals:  Food log (ie.) www.[employer].com,sparkpeople.com,[employer]Author[employer].com,MovieLine.com,etc.     No sugary beverages. At least 64oz of water daily.    Increase physical activity by 10 minutes daily    START ZEPBOUND  I have sent Zebound to your pharmacy. The prior authorization process will been done through our prior authorization team and can take up to 3 weeks to process through the insurance.      - Start Zepbound 2.5 mg subcutaneously injection weekly.  You will need a nurse visit in 4 weeks.  If you are doing well at that time the dose will be increased to Zepbound 5mg weekly.     - Side effects of Zepbound include nausea, vomiting, diarrhea, or constipation. Keep an eye on your heart rate while on Zepbound. If you resting heart rate is greater than 100 beats per minutes, please notify me. If you develop severe abdominal pain, stop Zepbound and go to the emergency room, as that could be a sign of pancreatitis.      - Please notify me if you have surgery, upper endoscopy, or colonoscopy scheduled, as we typically hold Zepbound for one week prior to the procedure.     If this is not covered we can do wegovy instead.  If you cannot find this then we will do buproprion/naltrexone instead.    Calorie goal:  2905-2522 calories a day (Provided with meal plan to follow).    Nurse visit in 1 months  Return visit:  3 months     Total time spent reviewing chart, interviewing patient, examining  patient, discussing plan, answering all questions, and documentin min.       ______________________________________________________________________      Subjective:   Chief Complaint   Patient presents with    Consult     MWM-Consult;Gw-140lb       HPI: Bisi Orozco  is a 43 y.o. female with history of prediabetes and excess weight, here to pursue weight loss management.  Previous notes and records have been reviewed.    HPI  Wt Readings from Last 20 Encounters:   24 77.7 kg (171 lb 3.2 oz)   24 80.7 kg (178 lb)   24 82.6 kg (182 lb)   11/10/23 82.9 kg (182 lb 12.8 oz)   23 82 kg (180 lb 12.4 oz)   20 68.8 kg (151 lb 9.6 oz)   19 74.2 kg (163 lb 9.6 oz)   18 71.3 kg (157 lb 3.2 oz)   18 73 kg (161 lb)   10/02/18 73 kg (161 lb)   18 68.9 kg (152 lb)   08/15/18 68.9 kg (152 lb)   18 74.4 kg (164 lb)     Excess Weight:  Onset:  1-2 years ago    Highest weight: 182  Current weight: 171  Goal: 140  What has been tried: Diet and Exercise  High protein diet.  No longer working.  She feels that her appetite has been intense recent.  Stress and emotional eating. Worsened over the past year.    Phentermine and topiramate was effective in the past - lost about 15 lbs each time  (2-3 times in the past).  Denied having any issues with the medication.    Contributing factors: Poor Food Choices and Stress/Emotional Eating  Associated symptoms and effects: comorbid conditions    Hunger/Cravings: + hunger, + cravings for rice, sweets  Dining out:  once a weel  Hydration:   Water 64    1 a day Coffee (cream and sugar)  Alcohol:  6 drinks a month  Smoking: No  Exercise:  No.  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    History reviewed. No pertinent past medical history.  Patient denies personal and family history of pancreatitis, thyroid cancer, MEN-2  "tumors.  Denies any hx of glaucoma, seizures, kidney stones, gallstones.  Denies Hx of CAD, PAD, palpitations, arrhythmia.   Denies uncontrolled anxiety or depression, suicidal behavior or thinking , insomnia or sleep disturbance.   Past Surgical History:   Procedure Laterality Date    HYSTERECTOMY      For bleeding    TUBAL LIGATION      TUBAL LIGATION       The following portions of the patient's history were reviewed and updated as appropriate: allergies, current medications, past family history, past medical history, past social history, past surgical history, and problem list.    Review Of Systems:  Review of Systems   Constitutional:  Negative for activity change, appetite change, fatigue and fever.   Respiratory:  Negative for cough and shortness of breath.    Cardiovascular:  Negative for chest pain, palpitations and leg swelling.   Gastrointestinal:  Negative for abdominal pain, constipation, diarrhea, nausea and vomiting.   Endocrine: Negative for cold intolerance and heat intolerance.   Genitourinary:  Negative for difficulty urinating and dysuria.   Musculoskeletal:  Negative for arthralgias, back pain and gait problem.   Skin:  Negative for pallor and rash.   Neurological:  Negative for headaches.   Psychiatric/Behavioral:  Negative for dysphoric mood, sleep disturbance and suicidal ideas (or HI). The patient is not nervous/anxious.      Objective:  /70   Pulse 71   Temp (!) 97.4 °F (36.3 °C)   Resp 16   Ht 5' 4\" (1.626 m)   Wt 77.7 kg (171 lb 3.2 oz)   BMI 29.39 kg/m²   Physical Exam  Vitals and nursing note reviewed.   Constitutional:       General: She is not in acute distress.     Appearance: Normal appearance. She is not ill-appearing or diaphoretic.   Eyes:      General: No scleral icterus.  Cardiovascular:      Rate and Rhythm: Normal rate and regular rhythm.      Pulses: Normal pulses.      Heart sounds: No murmur heard.  Pulmonary:      Effort: Pulmonary effort is normal. No " respiratory distress.      Breath sounds: Normal breath sounds. No wheezing or rhonchi.   Abdominal:      General: Bowel sounds are normal. There is no distension.      Palpations: Abdomen is soft. There is no mass.      Tenderness: There is no abdominal tenderness.   Musculoskeletal:      Cervical back: Neck supple.      Right lower leg: No edema.      Left lower leg: No edema.   Lymphadenopathy:      Cervical: No cervical adenopathy.   Skin:     Capillary Refill: Capillary refill takes less than 2 seconds.      Findings: No lesion or rash.   Neurological:      Mental Status: She is alert and oriented to person, place, and time.      Gait: Gait normal.   Psychiatric:         Mood and Affect: Mood normal.         Behavior: Behavior normal.     Labs and Imaging  Recent labs and imaging have been personally reviewed.  Lab Results   Component Value Date    WBC 3.78 (L) 05/14/2023    HGB 12.9 05/14/2023    HCT 39.0 05/14/2023    MCV 98 05/14/2023     05/14/2023     Lab Results   Component Value Date    SODIUM 138 01/28/2023    K 4.9 01/28/2023     01/28/2023    CO2 30 01/28/2023    AGAP 7 10/06/2018    BUN 18 01/28/2023    CREATININE 1.00 (H) 01/28/2023    GLUC 93 01/28/2023    GLUF 86 10/06/2018    CALCIUM 9.3 01/28/2023    AST 17 01/28/2023    ALT 11 01/28/2023    ALKPHOS 41 01/28/2023    TP 7.1 01/28/2023    TBILI 0.5 01/28/2023    EGFR 71 01/28/2023     Lab Results   Component Value Date    HGBA1C 5.9 (H) 11/10/2023     Lab Results   Component Value Date    TSH 1.16 01/09/2021     Lab Results   Component Value Date    CHOLESTEROL 158 10/06/2018     Lab Results   Component Value Date    HDL 64 (H) 10/06/2018     Lab Results   Component Value Date    TRIG 38 10/06/2018     Lab Results   Component Value Date    LDLCALC 86 10/06/2018      Contains abnormal data Hemoglobin A1C  Order: [id number]   Status: Final result       Visible to patient: Yes (seen)       Next appt: 04/26/2024 at 03:45 PM in Orthopedic  Surgery (Smith Lyon MD)       Dx: Prediabetes    0 Result Notes        Component  Ref Range & Units 11/10/23  3:58 PM 1/28/23  9:52 AM 1/9/21 10:55 AM   Hemoglobin A1C  Normal 4.0-5.6%; PreDiabetic 5.7-6.4%; Diabetic >=6.5%; Glycemic control for adults with diabetes <7.0% % 5.9 High   5.5 R, CM   EAG  mg/dl 123

## 2024-03-26 NOTE — PATIENT INSTRUCTIONS
- Discussed options of HealthyCORE-Intensive Lifestyle Intervention Program, Very Low Calorie Diet-VLCD, and Conservative Program and the role of weight loss medications.  - Explained the importance of making lifestyle changes first before starting anti-obesity medications.  - Patient should demonstrate lifestyle changes first before anti-obesity medication initiated.   - Patient is interested in pursuing Conservative Program  - Initial weight loss goal of 5-10% weight loss for improved health as studies have shown this is where we see the greatest impact on improving health and decreasing risk of obesity related conditions.  - Weight loss can improve patient's co-morbid conditions and/or prevent weight-related complications.  - Labs reviewed: As below.      General Recommendations:  Nutrition:  Eat breakfast daily.  Do not skip meals.     Food log (ie.) www.Five Cool.com, sparkpeople.com, loseit.com, calorieking.com, etc.    Practice mindful eating.  Be sure to set aside time to eat, eat slowly, and savor your food.    Hydration:    At least 64oz of water daily.  No sugar sweetened beverages.  No juice (eat the fruit instead).    Exercise:  Studies have shown that the ideal exercise goal is somewhere between 150 to 300 minutes of moderate intensity exercise a week.  Start with exercising 10 minutes every other day and gradually increase physical activity with a goal of at least 150 minutes of moderate intensity exercise a week, divided over at least 3 days a week.  An example of this would be exercising 30 minutes a day, 5 days a week.  Resistance training can increase muscle mass and increase our resting metabolic rate.   FULL BODY resistance training is recommended 2-3 times a week.  Do not do this on consecutive days to allow for muscle recovery.    Aim for a bare minimum 5000 steps, even on days you do not exercise.    Monitoring:   Weigh yourself daily.  If this causes undue stress, then just weigh yourself  once a week.  Weigh yourself the same time of the day with the same amount of clothing on.  Preferably this should be done after waking up, before you eat, and with no clothing or minimal clothing on.    Specific Goals:  Food log (ie.) www.AOLfitnesspal.com,[employer]com,loseit.com,MindSnacks.com,etc.     No sugary beverages. At least 64oz of water daily.    Increase physical activity by 10 minutes daily    START ZEPBOUND  I have sent Zebound to your pharmacy. The prior authorization process will been done through our prior authorization team and can take up to 3 weeks to process through the insurance.      - Start Zepbound 2.5 mg subcutaneously injection weekly.  You will need a nurse visit in 4 weeks.  If you are doing well at that time the dose will be increased to Zepbound 5mg weekly.     - Side effects of Zepbound include nausea, vomiting, diarrhea, or constipation. Keep an eye on your heart rate while on Zepbound. If you resting heart rate is greater than 100 beats per minutes, please notify me. If you develop severe abdominal pain, stop Zepbound and go to the emergency room, as that could be a sign of pancreatitis.      - Please notify me if you have surgery, upper endoscopy, or colonoscopy scheduled, as we typically hold Zepbound for one week prior to the procedure.     If this is not covered we can do wegovy instead.  If you cannot find this then we will do buproprion/naltrexone instead.    Calorie goal:  6903-1285 calories a day (Provided with meal plan to follow).    Nurse visit in 1 months  Return visit:  3 months

## 2024-03-27 ENCOUNTER — TELEPHONE (OUTPATIENT)
Age: 44
End: 2024-03-27

## 2024-03-27 NOTE — TELEPHONE ENCOUNTER
PA for zepbound    Submitted via    []CMM-KEY    [x][employer]-Case ID # [id number]   []Faxed to plan   []Other website    []Phone call Case ID #      Office notes sent, clinical questions answered. Awaiting determination    Turnaround time for your insurance to make a decision on your Prior Authorization can take 7-21 business days.

## 2024-03-28 NOTE — TELEPHONE ENCOUNTER
PA for Zepbound Approved   Date(s) approved 3/25/24-3/25/25  Case # [id number]     Patient advised by [x] [employer] Message                      [] Phone call       Pharmacy advised by [x]Fax                                     []Phone call    Approval letter scanned into Media No Approval via SS

## 2024-04-02 ENCOUNTER — TELEPHONE (OUTPATIENT)
Age: 44
End: 2024-04-02

## 2024-04-02 NOTE — TELEPHONE ENCOUNTER
Pt wants to reschedule her nurse visit.  I transferred her to [facility] in the office

## 2024-04-22 ENCOUNTER — OFFICE VISIT (OUTPATIENT)
Dept: OBGYN CLINIC | Facility: CLINIC | Age: 44
End: 2024-04-22
Payer: COMMERCIAL

## 2024-04-22 VITALS
BODY MASS INDEX: 27.63 KG/M2 | WEIGHT: 165.8 LBS | HEIGHT: 65 IN | DIASTOLIC BLOOD PRESSURE: 76 MMHG | SYSTOLIC BLOOD PRESSURE: 112 MMHG

## 2024-04-22 DIAGNOSIS — R23.2 HOT FLASHES: ICD-10-CM

## 2024-04-22 DIAGNOSIS — R10.2 PELVIC PAIN: Primary | ICD-10-CM

## 2024-04-22 PROCEDURE — 87660 TRICHOMONAS VAGIN DIR PROBE: CPT | Performed by: OBSTETRICS & GYNECOLOGY

## 2024-04-22 PROCEDURE — 87480 CANDIDA DNA DIR PROBE: CPT | Performed by: OBSTETRICS & GYNECOLOGY

## 2024-04-22 PROCEDURE — 99203 OFFICE O/P NEW LOW 30 MIN: CPT | Performed by: OBSTETRICS & GYNECOLOGY

## 2024-04-22 PROCEDURE — 87510 GARDNER VAG DNA DIR PROBE: CPT | Performed by: OBSTETRICS & GYNECOLOGY

## 2024-04-22 NOTE — PROGRESS NOTES
"Subjective    Patient is a 42 yo  who presents today to discuss pelvic pain. She describes the pain as in her lower abdomen, and feels similar to a yeast infection she had previously. The pain is more on her left side; she denies any vaginal discharge or itching.    Her history is notable for a prior total hysterectomy. She is experiencing perimenopausal symptoms such as hot flashes, and is curious whether she is in actual menopause.    OB History          2    Para   2    Term   2            AB        Living   2         SAB        IAB        Ectopic        Multiple        Live Births   2                        Objective    Vitals:    24 1502   BP: 112/76   BP Location: Left arm   Patient Position: Sitting   Cuff Size: Standard   Weight: 75.2 kg (165 lb 12.8 oz)   Height: 5' 5\" (1.651 m)        Physical Exam  Constitutional:       Appearance: Normal appearance.   Genitourinary:      Vulva normal.      Genitourinary Comments: Small amount of whitish vaginal discharge; no adnexal masses palpated; uterus and cervix surgically absent      Vaginal discharge present.   HENT:      Head: Normocephalic and atraumatic.   Cardiovascular:      Rate and Rhythm: Normal rate.   Pulmonary:      Effort: Pulmonary effort is normal. No respiratory distress.   Abdominal:      Palpations: Abdomen is soft.      Tenderness: There is abdominal tenderness. There is no guarding or rebound.      Comments: Tenderness to palpation in LLQ   Musculoskeletal:         General: Normal range of motion.   Neurological:      Mental Status: She is alert.   Skin:     General: Skin is warm and dry.          Assessment    Patient Active Problem List   Diagnosis    Gastritis    H. pylori infection    Alopecia    Prediabetes    Obesity, Class I, BMI 30-34.9        Plan  - Affirm collected  - Pelvic US ordered  - FSH, LH, estradiol ordered to assess menopausal symptoms; discussed with patient that these may be perimenopausal symptoms, " which can start years before menopause

## 2024-04-23 LAB
CANDIDA RRNA VAG QL PROBE: NOT DETECTED
G VAGINALIS RRNA GENITAL QL PROBE: NOT DETECTED
T VAGINALIS RRNA GENITAL QL PROBE: NOT DETECTED

## 2024-04-27 ENCOUNTER — APPOINTMENT (OUTPATIENT)
Dept: LAB | Facility: HOSPITAL | Age: 44
End: 2024-04-27
Payer: COMMERCIAL

## 2024-04-27 DIAGNOSIS — R23.2 HOT FLASHES: ICD-10-CM

## 2024-04-27 DIAGNOSIS — Z00.8 HEALTH EXAMINATION IN POPULATION SURVEY: ICD-10-CM

## 2024-04-27 LAB
CHOLEST SERPL-MCNC: 182 MG/DL
EST. AVERAGE GLUCOSE BLD GHB EST-MCNC: 123 MG/DL
ESTRADIOL SERPL-MCNC: 15.7 PG/ML
FSH SERPL-ACNC: 92.5 MIU/ML
HBA1C MFR BLD: 5.9 %
HDLC SERPL-MCNC: 88 MG/DL
LDLC SERPL CALC-MCNC: 84 MG/DL (ref 0–100)
LH SERPL-ACNC: 74.7 MIU/ML
NONHDLC SERPL-MCNC: 94 MG/DL
TRIGL SERPL-MCNC: 49 MG/DL

## 2024-04-27 PROCEDURE — 36415 COLL VENOUS BLD VENIPUNCTURE: CPT

## 2024-04-27 PROCEDURE — 83002 ASSAY OF GONADOTROPIN (LH): CPT

## 2024-04-27 PROCEDURE — 83036 HEMOGLOBIN GLYCOSYLATED A1C: CPT

## 2024-04-27 PROCEDURE — 82670 ASSAY OF TOTAL ESTRADIOL: CPT

## 2024-04-27 PROCEDURE — 83001 ASSAY OF GONADOTROPIN (FSH): CPT

## 2024-04-27 PROCEDURE — 80061 LIPID PANEL: CPT

## 2024-04-29 ENCOUNTER — TELEPHONE (OUTPATIENT)
Dept: BARIATRICS | Facility: CLINIC | Age: 44
End: 2024-04-29

## 2024-04-29 DIAGNOSIS — E66.9 OBESITY, CLASS I, BMI 30-34.9: Primary | ICD-10-CM

## 2024-04-29 RX ORDER — TIRZEPATIDE 5 MG/.5ML
5 INJECTION, SOLUTION SUBCUTANEOUS WEEKLY
Qty: 2 ML | Refills: 0 | Status: SHIPPED | OUTPATIENT
Start: 2024-04-29 | End: 2024-05-01

## 2024-04-29 NOTE — TELEPHONE ENCOUNTER
Patient called the RX Refill Line. Message is being forwarded to the office.     Patient is requesting a call back.  She needs a new script for   tirzepatide (Zepbound) 2.5 mg/0.5 mL auto-injector.  It is no longer on her active med list.  She also wanted to let the dr know that this dose is not helping her.  She still has a big appetite and she is struggling.  Is she due for a dose increase?  She is otherwise felling great on it.  Please send to   [facility]      Please contact patient at [phone]

## 2024-04-30 ENCOUNTER — TELEPHONE (OUTPATIENT)
Age: 44
End: 2024-04-30

## 2024-04-30 NOTE — TELEPHONE ENCOUNTER
Patient called the RX Refill Line. Message is being forwarded to the office.     Patient is requesting to be switched to Wegovy due to she has checked with multiple pharmacies and she can not get Zepbound due to it is on back order until the end of May     Please contact patient directly to discuss further [phone]

## 2024-04-30 NOTE — TELEPHONE ENCOUNTER
Patient called the RX Refill Line. Message is being forwarded to the office.     Patient is requesting she called around to the local pharmacies and Zepbound 5mg is not available but she did find the zepbound 2.5 at [facility] on [address]. Please advise.     Please contact patient at

## 2024-05-01 ENCOUNTER — TELEPHONE (OUTPATIENT)
Age: 44
End: 2024-05-01

## 2024-05-01 DIAGNOSIS — E66.9 OBESITY, CLASS I, BMI 30-34.9: Primary | ICD-10-CM

## 2024-05-01 RX ORDER — TIRZEPATIDE 2.5 MG/.5ML
2.5 INJECTION, SOLUTION SUBCUTANEOUS WEEKLY
Qty: 2 ML | Refills: 0 | Status: SHIPPED | OUTPATIENT
Start: 2024-05-01 | End: 2024-05-02

## 2024-05-01 NOTE — TELEPHONE ENCOUNTER
Patient called the Rx Refill Line asking for the 5 MG Zepbound to be called back in as the pharmacy does not have the 2.5 MG in stock. Please advise.

## 2024-05-02 ENCOUNTER — TELEPHONE (OUTPATIENT)
Age: 44
End: 2024-05-02

## 2024-05-02 DIAGNOSIS — E66.9 OBESITY, CLASS I, BMI 30-34.9: Primary | ICD-10-CM

## 2024-05-02 RX ORDER — TIRZEPATIDE 5 MG/.5ML
5 INJECTION, SOLUTION SUBCUTANEOUS WEEKLY
Qty: 2 ML | Refills: 0 | Status: SHIPPED | OUTPATIENT
Start: 2024-05-02 | End: 2024-05-30

## 2024-05-02 NOTE — TELEPHONE ENCOUNTER
patient called to check refill status for Zepbound. Pt was informed script was received and confirm by pharmacy today 5/2/2024

## 2024-05-05 ENCOUNTER — TELEPHONE (OUTPATIENT)
Dept: OTHER | Facility: OTHER | Age: 44
End: 2024-05-05

## 2024-05-05 NOTE — TELEPHONE ENCOUNTER
"Pt stated, \" My pharmacy does not have the 5 mg tirzepatide (Zepbound)  only the 2.5 mg I was wondering if I could get an order put in for both the 2.5 mg and the 5 mg of   tirzepatide (Zepbound).\"      Please follow up when office reopens, thank you.    "

## 2024-05-06 DIAGNOSIS — E66.9 OBESITY, CLASS I, BMI 30-34.9: Primary | ICD-10-CM

## 2024-05-06 RX ORDER — TIRZEPATIDE 2.5 MG/.5ML
2.5 INJECTION, SOLUTION SUBCUTANEOUS WEEKLY
Qty: 2 ML | Refills: 1 | Status: SHIPPED | OUTPATIENT
Start: 2024-05-06 | End: 2024-05-16

## 2024-05-06 NOTE — PROGRESS NOTES
Patient called requesting refill for zepbound 2.5mg Patient made aware medication was refilled on 05/06/24 for 2ml with 1 refills to [facility] pharmacy. Patient instructed to contact the pharmacy to obtain refills of medication. Patient verbalized understanding.

## 2024-05-07 ENCOUNTER — TELEPHONE (OUTPATIENT)
Age: 44
End: 2024-05-07

## 2024-05-07 NOTE — TELEPHONE ENCOUNTER
Marlene from [facility] calling.  States pt is having difficulty filling her Zepbound 5 mg, states she thinks a new prescription needs to be sent to pharmacy.  RN reviewed chart and advised that Zepbound 5 mg was sent on 5/2/24.  Marlene said that she will call the pharmacy for further assistance, to see what is holding it up on their end.

## 2024-05-07 NOTE — TELEPHONE ENCOUNTER
Pt called in stating that the pharmacy told her that her ins will only cover the zepbound for 6 months but she received a letter from her ins stating that they will cover for the year. Pt would like to see if the script can be adjusted or switch her to something else if this is going to be an ongoing issue.

## 2024-05-09 ENCOUNTER — OFFICE VISIT (OUTPATIENT)
Dept: OBGYN CLINIC | Facility: CLINIC | Age: 44
End: 2024-05-09
Payer: COMMERCIAL

## 2024-05-09 VITALS
WEIGHT: 165 LBS | SYSTOLIC BLOOD PRESSURE: 102 MMHG | DIASTOLIC BLOOD PRESSURE: 57 MMHG | BODY MASS INDEX: 27.49 KG/M2 | HEIGHT: 65 IN

## 2024-05-09 DIAGNOSIS — G56.01 CARPAL TUNNEL SYNDROME ON RIGHT: Primary | ICD-10-CM

## 2024-05-09 PROCEDURE — 20526 THER INJECTION CARP TUNNEL: CPT | Performed by: PHYSICIAN ASSISTANT

## 2024-05-09 PROCEDURE — 99213 OFFICE O/P EST LOW 20 MIN: CPT | Performed by: PHYSICIAN ASSISTANT

## 2024-05-09 RX ORDER — LIDOCAINE HYDROCHLORIDE 10 MG/ML
3 INJECTION, SOLUTION INFILTRATION; PERINEURAL
Status: COMPLETED | OUTPATIENT
Start: 2024-05-09 | End: 2024-05-09

## 2024-05-09 RX ORDER — BETAMETHASONE SODIUM PHOSPHATE AND BETAMETHASONE ACETATE 3; 3 MG/ML; MG/ML
6 INJECTION, SUSPENSION INTRA-ARTICULAR; INTRALESIONAL; INTRAMUSCULAR; SOFT TISSUE
Status: COMPLETED | OUTPATIENT
Start: 2024-05-09 | End: 2024-05-09

## 2024-05-09 RX ADMIN — LIDOCAINE HYDROCHLORIDE 3 ML: 10 INJECTION, SOLUTION INFILTRATION; PERINEURAL at 15:30

## 2024-05-09 RX ADMIN — BETAMETHASONE SODIUM PHOSPHATE AND BETAMETHASONE ACETATE 6 MG: 3; 3 INJECTION, SUSPENSION INTRA-ARTICULAR; INTRALESIONAL; INTRAMUSCULAR; SOFT TISSUE at 15:30

## 2024-05-09 NOTE — PROGRESS NOTES
Assessment:    Right carpal tunnel syndrome       Plan:    Steroid injection provided today and the patient tolerated well.  Activities as tolerated, no specific restrictions.  Patient prefers to follow-up in 3 months for reevaluation of symptoms.          Subjective:     HPI    Patient ID:  Bisi Orozco is a 43 y.o. female here for evaluation of the right hand.  She has known right carpal tunnel syndrome that was diagnosed a few years ago by EMG for which she was being seen at [facility] however she recently got a job here at [employer] last year and would like to establish care.  She states she has numbness and tingling of mainly the first 3 fingers of the right hand that is typically worse at night with activities.  She has received steroid injection for the carpal tunnel syndrome in the past which helps her significantly.  She has tried bracing oral medications and therapy and the injection works the best.  There is no recent injury or trauma to the area.  She denies normal numbness elsewhere on the hand.  No other complaints offered today.  She does state that she is not ready for surgery for this problem as of yet.      The following portions of the patient's history were reviewed and updated as appropriate: allergies, current medications, past family history, past medical history, past social history, past surgical history, and problem list.    Review of Systems     Objective:    Imaging:  Right hand x-rays 7/13/2023 outside facility  There are no acute fractures or dislocations. There are minimal degenerative changes to the thumb IP joint, index and middle finger DIP joint. There is mild subluxation of the first CMC joint without any significant degenerative joint disease. Carpal tunnel view is within normal limits. No other bony lesions or masses.       EMG RUE 9/18/2023  EMG: Right Upper Extremity   EMG nerve conduction studies were performed on the right upper extremity.   · Right median motor  conduction response reveals a prolonged distal latency of 4.8 ms. Right ulnar motor conduction response is within normal limits.   · Right median sensory response reveals a prolonged peak latency of 5.6 ms. Right ulnar sensory response is within normal limits.   · EMG study involving muscles innervated by all nerves and myotomes throughout the right upper extremity and cervical paraspinals are within normal limits.     Summary: There is electrophysiological evidence of moderate right carpal tunnel syndrome.     Physical Exam     Vitals:    05/09/24 1458   BP: 102/57     General appearance:  NAD   Cardiac:  Regular rate  Lungs:  Unlabored breathing  Abdomen:  Non-distended    Orthopedic Examination:  Right hand     Inspection: No open wounds or erythema.  No ecchymosis or swelling.  No muscle wasting.    Palpation: Nontender to palpation bony prominences of the hand and wrist, nontender first dorsal extensor compartment    Range-of-motion: Normal wrist range of motion, full composite fist    Strength: 5/5 wrist flexion extension, , intrinsics, thumb opposition, APB    Sensation: Intact to light touch median radial ulnar nerve distribution    Special Tests: Positive Tinel's and Durkan's compression at the wrist.  Negative Finkelstein's  Palpable radial pulse  The upper extremities warm and well-perfused.    Hand/upper extremity injection: R carpal tunnel  [place] Protocol:  Consent: Verbal consent obtained.  Risks and benefits: risks, benefits and alternatives were discussed  Consent given by: patient  Patient identity confirmed: verbally with patient  Supporting Documentation  Indications: pain   Procedure Details  Condition:carpal tunnel syndrome Site: R carpal tunnel   Preparation: Patient was prepped and draped in the usual sterile fashion  Needle size: 22 G  Ultrasound guidance: no  Approach: volar  Medications administered: 3 mL lidocaine 1 %; 6 mg betamethasone acetate-betamethasone sodium phosphate 6  (3-3) mg/mL  Patient tolerance: patient tolerated the procedure well with no immediate complications  Dressing:  Sterile dressing applied

## 2024-05-16 ENCOUNTER — OFFICE VISIT (OUTPATIENT)
Dept: DERMATOLOGY | Facility: CLINIC | Age: 44
End: 2024-05-16
Payer: COMMERCIAL

## 2024-05-16 ENCOUNTER — CLINICAL SUPPORT (OUTPATIENT)
Dept: BARIATRICS | Facility: CLINIC | Age: 44
End: 2024-05-16

## 2024-05-16 VITALS — HEIGHT: 65 IN | WEIGHT: 159 LBS | BODY MASS INDEX: 26.49 KG/M2

## 2024-05-16 VITALS
HEIGHT: 64 IN | WEIGHT: 159.8 LBS | SYSTOLIC BLOOD PRESSURE: 100 MMHG | TEMPERATURE: 97.6 F | HEART RATE: 72 BPM | BODY MASS INDEX: 27.28 KG/M2 | DIASTOLIC BLOOD PRESSURE: 68 MMHG

## 2024-05-16 DIAGNOSIS — R63.5 ABNORMAL WEIGHT GAIN: Primary | ICD-10-CM

## 2024-05-16 DIAGNOSIS — L63.9 ALOPECIA AREATA: Primary | ICD-10-CM

## 2024-05-16 DIAGNOSIS — E66.9 OBESITY, CLASS I, BMI 30-34.9: Primary | ICD-10-CM

## 2024-05-16 PROCEDURE — RECHECK

## 2024-05-16 PROCEDURE — 11900 INJECT SKIN LESIONS </W 7: CPT | Performed by: DERMATOLOGY

## 2024-05-16 PROCEDURE — 99214 OFFICE O/P EST MOD 30 MIN: CPT | Performed by: DERMATOLOGY

## 2024-05-16 RX ORDER — DOXYCYCLINE HYCLATE 20 MG
20 TABLET ORAL 2 TIMES DAILY
Qty: 60 TABLET | Refills: 1 | Status: SHIPPED | OUTPATIENT
Start: 2024-05-16 | End: 2024-07-15

## 2024-05-16 RX ORDER — TIRZEPATIDE 7.5 MG/.5ML
7.5 INJECTION, SOLUTION SUBCUTANEOUS WEEKLY
Qty: 2 ML | Refills: 0 | Status: SHIPPED | OUTPATIENT
Start: 2024-05-16 | End: 2024-06-13

## 2024-05-16 NOTE — PATIENT INSTRUCTIONS
"Assessment and Plan:  - History and physical consistent with alopecia areata   - Educated that this is an autoimmune disease resulting in non-scarring hair loss  - Discussed the natural history of alopecia areata with most patients demonstrating limited areas of involvement of the scalp, eyebrows, eyelashes or body hair but that rarely alopecia areata can progress to alopecia totalis (complete loss of hair on the scalp) or alopecia universalis (complete loss of hair on the scalp and body).   - Educated that 50% of patients with limited hair loss will recover within a year but many will experience multiple episodes.  - Educated that in some cases hair loss does not respond to treatments.  - Treatment options including topical steroids, intralesional steroids, mini pulse oral steroids, TOMAS inhibitors, minoxidil, including the benefits and risks.  - Based on a thorough discussion of this condition and the management approach to it (including a comprehensive discussion of the known risks, side effects and potential benefits of treatment), the patient (family) agrees to implement the following specific plan:     Kenalog treatment done today in office as below  START topical clobetasol 0.05% soln nightly to areas of hair loss  Denies history of diverticulitis, low white blood cell count, liver dysfunction, active infection, untreated lipid abnormalities, known malignancy.   Follow up one month  Biopsy proven in 2008 from previous dermatologist  \"alopecia areata\"  Biopsy appointment per Dr soto will consider new treatment plan              PROCEDURE:  INTRALESIONAL STEROID INJECTION (KENALOG INJECTION FOR ALOPECIA AREATA)     Purpose: Triamcinolone is a synthetic glucocorticoid corticosteroid that has marked anti-inflammatory action. It is prepared in sterile aqueous suspension suitable for injecting directly into a lesion on or immediately below the skin to treat a dermal inflammatory process.      Indications: It is " indicated for alopecia areata; inflammatory acne cysts; discoid lupus erythematosus; keloids and hypertrophic scars; inflammatory lesions of granuloma annulare, lichen planus, lichen simplex chronicus (neurodermatitis), psoriatic plaques, and other localized inflammatory skin conditions.      References: Based on a recent publication in [employer] (PMID [id number]), Dr. Schroeder treats patients with Alopecia Areata with kenalog diluted with lidocaine plus epinephrine to the appropriate concentration. The use of lidocaine plus epinephrine over saline as the diluent appears to have additional benefits and has been shown to increase efficacy of treatment.     Potential Side Effects: I understand that triamcinolone injection can potentially cause early and/or delayed adverse effects such as:    Pain    Impaired wound healing    Increased hair growth    Bleeding    White or brown marks    Steroid acne    Infection scalp   Telangiectasia    Skin thinning    Cutaneous and subcutaneous lipoatrophy (most common) appearing as skin indentations or dimples around the injection sites a few weeks after treatment      PROCEDURE NOTE:  After verbal and written consent were obtained, the to-be-treated area was wiped and cleaned with rubbing alcohol 70%.       Then, a total of   mL of Kenalog CONCENTRATION:  10 mg/mL diluted with 0.9% Sodium Chloride a final concentration of 2 mg/mL    (Lot# [id number]; Expiration 12/31/2025, NDC#: 5432-8011-20) was injected intralesionally into a total of 2.0cc  lesion/s on the following anatomic areas:  scalp  using a (2) 1-mL syringe and a 30 1/2-gauge needle.       There was less than 1 mL of blood loss and little to no discomfort.  The area was bandaged with a Band-aid.  The patient tolerated the procedure well and remained in the office for observation.  With no signs of an adverse reaction, the patient was eventually discharged from clinic.

## 2024-05-16 NOTE — PROGRESS NOTES
"[facility] Note     Patient Name: Bisi Orozco  Encounter Date: 05/16/24     Have you been cared for by a [facility] Dermatologist in the last 3 years and, if so, which description applies to you?    Yes.  I have been here within the last 3 years, and my medical history has NOT changed since that time.  I am FEMALE/of child-bearing potential.    REVIEW OF SYSTEMS:  Have you recently had or currently have any of the following? No changes in my recent health.   PAST MEDICAL HISTORY:  Have you personally ever had or currently have any of the following?  If \"YES,\" then please provide more detail. No changes in my medical history.   HISTORY OF IMMUNOSUPPRESSION: Do you have a history of any of the following:  Systemic Immunosuppression such as Diabetes, Biologic or Immunotherapy, Chemotherapy, Organ Transplantation, Bone Marrow Transplantation?  No     Answering \"YES\" requires the addition of the dotphrase \"IMMUNOSUPPRESSED\" as the first diagnosis of the patient's visit.   FAMILY HISTORY:  Any \"first degree relatives\" (parent, brother, sister, or child) with the following?    No changes in my family's known health.   PATIENT EXPERIENCE:    Do you want the Dermatologist to perform a COMPLETE skin exam today including a clinical examination under the \"bra and underwear\" areas?  NO  If necessary, do we have your permission to call and leave a detailed message on your Preferred Phone number that includes your specific medical information?  Yes      Allergies   Allergen Reactions   • Morphine Hives      Current Outpatient Medications:   •  doxycycline (PERIOSTAT) 20 MG tablet, Take 1 tablet (20 mg total) by mouth 2 (two) times a day (Patient not taking: Reported on 5/16/2024), Disp: 60 tablet, Rfl: 1  •  Linaclotide (LINZESS) 290 MCG CAPS, Take 290 mcg by mouth daily as needed, Disp: , Rfl:   •  gabapentin (NEURONTIN) 300 mg capsule, Take 1 capsule (300 mg total) by mouth daily at bedtime, Disp: 30 " "capsule, Rfl: 1  •  meloxicam (Mobic) 15 mg tablet, Take 1 tablet (15 mg total) by mouth daily (Patient not taking: Reported on 5/16/2024), Disp: 30 tablet, Rfl: 0  •  tirzepatide (Zepbound) 7.5 mg/0.5 mL auto-injector, Inject 0.5 mL (7.5 mg total) under the skin once a week for 28 days, Disp: 2 mL, Rfl: 0          Whom besides the patient is providing clinical information about today's encounter?   NO ADDITIONAL HISTORIAN (patient alone provided history)    Physical Exam and Assessment/Plan by Diagnosis:    Patient here today for alopecia follow up pt say she doesn't notice any changes .      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  Scal firm fibrous  Morphological Description:    No perifollicular erythema or scale.  Pertinent Positives:  biopsy 2008 reviewed form [facility]  Pertinent Negatives: Patient does has liner  eyebrows due to hairloss                         Assessment and Plan:  - Kenalog treatment done today in office as below  START topical clobetasol 0.05% soln nightly to areas of hair loss  Denies history of diverticulitis, low white blood cell count, liver dysfunction, active infection, untreated lipid abnormalities, known malignancy.   Follow up one month  Biopsy proven in 2008 from previous dermatologist  \"alopecia areata\"  Biopsy appointment per Dr soto will consider new treatment plan  Disucssed treatment options for presumed scarring alopecia.  Will start with doxy low dose.  Reveiewed adverse effect.  Consider plaquenial.              PROCEDURE:  INTRALESIONAL STEROID INJECTION (KENALOG INJECTION FOR ALOPECIA AREATA)     Purpose: Triamcinolone is a synthetic glucocorticoid corticosteroid that has marked anti-inflammatory action. It is prepared in sterile aqueous suspension suitable for injecting directly into a lesion on or immediately below the skin to treat a " dermal inflammatory process.      Indications: It is indicated for alopecia areata; inflammatory acne cysts; discoid lupus erythematosus; keloids and hypertrophic scars; inflammatory lesions of granuloma annulare, lichen planus, lichen simplex chronicus (neurodermatitis), psoriatic plaques, and other localized inflammatory skin conditions.      References: Based on a recent publication in [employer] (PMID [id number]), Dr. Schroeder treats patients with Alopecia Areata with kenalog diluted with lidocaine plus epinephrine to the appropriate concentration. The use of lidocaine plus epinephrine over saline as the diluent appears to have additional benefits and has been shown to increase efficacy of treatment.     Potential Side Effects: I understand that triamcinolone injection can potentially cause early and/or delayed adverse effects such as:   • Pain   • Impaired wound healing   • Increased hair growth   • Bleeding   • White or brown marks   • Steroid acne   • Infection scalp  • Telangiectasia   • Skin thinning   • Cutaneous and subcutaneous lipoatrophy (most common) appearing as skin indentations or dimples around the injection sites a few weeks after treatment      PROCEDURE NOTE:  After verbal and written consent were obtained, the to-be-treated area was wiped and cleaned with rubbing alcohol 70%.       Then, a total of 2.0  mL of Kenalog CONCENTRATION:  10 mg/mL diluted with 0.9% Sodium Chloride a final concentration of 2 mg/mL    (Lot# [id number]; Expiration 12/31/2025, NDC#: 0492-9339-70) was injected intralesionally into a total of 2.0cc  lesion/s on the following anatomic areas:  scalp  using a (2) 1-mL syringe and a 30 1/2-gauge needle.       There was less than 1 mL of blood loss and little to no discomfort.  The area was bandaged with a Band-aid.  The patient tolerated the procedure well and remained in the office for observation.  With no signs of an adverse reaction, the patient was eventually discharged from clinic.    Scribe Attestation    I,:  Gina Burns am acting as a scribe while in the presence of the attending physician.:       I,:  José Miguel Rivera MD personally performed the services described in this documentation    as scribed in my presence.:

## 2024-05-16 NOTE — PROGRESS NOTES
Patient last visit weight:  171 LB  Patient current visit weight:  159.8 LB    If you are taking phentermine or other oral weight loss medications, are you experiencing any of the following symptoms:   Headache:  Blurred Vision:    Chest Pain:   Palpitations:  Insomnia:   SPECIFY ORAL MEDICATION AND DOSAGE:     If you are taking an injectable medication,  are you experiencing any of the following symptoms:  Bloating:   Nausea:  Vomiting:   Constipation:   Diarrhea:  SPECIFY INJECTABLE MEDICATION AND CURRENT DOSAGE:  PT   IS TAKING  Zepbound 0.5 mg  Pt need prescription please  thank you .       Vitals:    Is BP less than 100/60? NO  Is BP greater than 140/90? NO  Is HR greater than 100? NO  **If yes to any of the above, have patient relax and repeat in 5-10 minutes**    Repeat values:    Is BP less than 100/60?  Is BP greater than 140/90?  Is HR greater than 100?  **If values remain outside of ranges above, please consult provider for next steps**

## 2024-05-17 ENCOUNTER — TELEPHONE (OUTPATIENT)
Age: 44
End: 2024-05-17

## 2024-05-17 NOTE — TELEPHONE ENCOUNTER
Received call from patient asking for clarification on medication Doxycycline.    She is questioning the refills on the bottle.    She stated that there is one plus refill.    She would like to know if after she is done taking the first round does she need to go back to the pharmacy to get a refill?    Please advise

## 2024-06-12 DIAGNOSIS — L63.9 ALOPECIA AREATA: ICD-10-CM

## 2024-06-12 RX ORDER — CLOBETASOL PROPIONATE 0.5 MG/G
CREAM TOPICAL 2 TIMES DAILY
Qty: 60 G | Refills: 3 | OUTPATIENT
Start: 2024-06-12

## 2024-06-12 NOTE — TELEPHONE ENCOUNTER
Medication Refill Request     Name   clobetasol (TEMOVATE) 0.05 % cream [[id number]]     Dose/Frequency Apply topically 2 (two) times a day Apply twice a day   Quantity 60 g  Verified pharmacy   [x]  Verified ordering Provider   [x]  Does patient have enough for the next 3 days? Yes [] No [x]

## 2024-06-12 NOTE — TELEPHONE ENCOUNTER
Patient was last seen 05/16/24 and clobetasol cream was discontinue and patient starting clobetasol solution    Please advise

## 2024-06-25 ENCOUNTER — TELEPHONE (OUTPATIENT)
Age: 44
End: 2024-06-25

## 2024-06-25 NOTE — TELEPHONE ENCOUNTER
Spoke with patient she was asking what to expect with her scheduled biopsy on 6/27/24, advised patient that she can eat and drink like normal she will be awake for the whole thing we just do a local numbing to the area, she will be able to drive herself to and from appointment, advised her that if they do a punch biopsy she will have sutures, left her know she will have a pressure dressing on and after that comes off she will want to apply vaseline and a bandage daily to the area until healed

## 2024-06-25 NOTE — TELEPHONE ENCOUNTER
Patient called asking if she should request off from work day after 6/27 Biopsy scheduled or what kind of instructions she should have prior and after    Triaged call

## 2024-06-27 ENCOUNTER — TELEPHONE (OUTPATIENT)
Age: 44
End: 2024-06-27

## 2024-06-27 ENCOUNTER — PROCEDURE VISIT (OUTPATIENT)
Dept: DERMATOLOGY | Facility: CLINIC | Age: 44
End: 2024-06-27
Payer: COMMERCIAL

## 2024-06-27 VITALS — BODY MASS INDEX: 25.92 KG/M2 | TEMPERATURE: 96.7 F | WEIGHT: 151 LBS

## 2024-06-27 DIAGNOSIS — L63.9 ALOPECIA AREATA: ICD-10-CM

## 2024-06-27 DIAGNOSIS — D48.5 NEOPLASM OF UNCERTAIN BEHAVIOR OF SKIN: Primary | ICD-10-CM

## 2024-06-27 PROCEDURE — 88313 SPECIAL STAINS GROUP 2: CPT | Performed by: STUDENT IN AN ORGANIZED HEALTH CARE EDUCATION/TRAINING PROGRAM

## 2024-06-27 PROCEDURE — 88305 TISSUE EXAM BY PATHOLOGIST: CPT | Performed by: STUDENT IN AN ORGANIZED HEALTH CARE EDUCATION/TRAINING PROGRAM

## 2024-06-27 PROCEDURE — 11104 PUNCH BX SKIN SINGLE LESION: CPT | Performed by: DERMATOLOGY

## 2024-06-27 RX ORDER — CLOBETASOL PROPIONATE 0.5 MG/G
CREAM TOPICAL 2 TIMES DAILY
Qty: 60 G | Refills: 3 | Status: SHIPPED | OUTPATIENT
Start: 2024-06-27

## 2024-06-27 NOTE — TELEPHONE ENCOUNTER
I confirm with DCA in room, 4-0 prolene was used which needs to be removed in 2 weeks. Wound care and appt was scheduled, which patient ask for work note after.     Thank you

## 2024-06-27 NOTE — TELEPHONE ENCOUNTER
Patient cancelled the appointment on 07/11 .  She would like to come between 07/15-07/19 due to her work schedule ; then offered 07/16 she can also to try to go to urgent care on 07/11 to have them removed .

## 2024-06-27 NOTE — TELEPHONE ENCOUNTER
She can go 14days with them in, [facility] only does nurse visit Tuesday and Thursday. She more than welcome to go to [facility] the have Monday Wednesday and Fridays, that falls in those date. She can go to urgent  care as well

## 2024-06-27 NOTE — TELEPHONE ENCOUNTER
Patient called to reschedule her 7/11 Nurse APT  She stated that she told them at the office that she can't do 7/11 and would like an apt starting 7/15  Her apt was in for suture removal but patient stated she was advised that stitches are dissolvable  Triaged call

## 2024-06-27 NOTE — TELEPHONE ENCOUNTER
"Patient called very confused , she had a biopsy done on scalp today and was told that the sutures are dissolvable but now she found an appointment made for suture removal on 07/11 , she asked me to Cancell it and if she has to come back to remove her sutures it needs to be between 7/15-7/19.   She was concerned because her after visit does not says anything ,she wants to know what to do and when she can wash her hair.   I went over her after visit summary and explained in detail every single thing ; patient verbally admitted to understand .   III. POST-PROCEDURAL CARE (WHAT YOU WILL NEED TO DO \"AFTER THE BIOPSY\" TO OPTIMIZE HEALING) Keep the area clean and dry. Try NOT to remove the bandage or get it wet for the first 24 hours. Gently clean the area and apply surgical ointment (such as Vaseline petrolatum ointment, which is available \"over the counter\" and not a prescription) to the biopsy site for up to 2 weeks straight. This acts to protect the wound from the outside world. Sutures are not usually placed in this procedure. If any sutures were placed, return for suture removal as instructed (generally 1 week for the face, 2 weeks for the body). Take Acetaminophen (Tylenol) for discomfort, if no contraindications. Ibuprofen or aspirin could make bleeding worse. Call our office immediately for signs of infection: fever, chills, increased redness, warmth, tenderness, discomfort/pain, or pus or foul smell coming from the wound. WHAT TO DO IF THERE IS ANY BLEEDING? If a small amount of bleeding is noticed, place a clean cloth over the area and apply firm pressure for ten minutes. Check the wound after 10 minutes of direct pressure. If bleeding persists, try one more time for an additional 10 minutes of direct pressure on the area. If the bleeding becomes heavier or does not stop after the second attempt, or if you have any other questions about this procedure, then please call your [facility] Dermatologist by calling " [phone] (SKIN).

## 2024-06-27 NOTE — PROGRESS NOTES
"[facility]atology [facility] Note     Patient Name: Bisi Orozco  Encounter Date: 6/27/2024     Have you been cared for by a [facility] Dermatologist in the last 3 years and, if so, which description applies to you?    Yes.  I have been here within the last 3 years, and my medical history has NOT changed since that time.  I am FEMALE/of child-bearing potential.    REVIEW OF SYSTEMS:  Have you recently had or currently have any of the following? No changes in my recent health.   PAST MEDICAL HISTORY:  Have you personally ever had or currently have any of the following?  If \"YES,\" then please provide more detail. No changes in my medical history.   HISTORY OF IMMUNOSUPPRESSION: Do you have a history of any of the following:  Systemic Immunosuppression such as Diabetes, Biologic or Immunotherapy, Chemotherapy, Organ Transplantation, Bone Marrow Transplantation?  No     Answering \"YES\" requires the addition of the dotphrase \"IMMUNOSUPPRESSED\" as the first diagnosis of the patient's visit.   FAMILY HISTORY:  Any \"first degree relatives\" (parent, brother, sister, or child) with the following?    No changes in my family's known health.   PATIENT EXPERIENCE:    Do you want the Dermatologist to perform a COMPLETE skin exam today including a clinical examination under the \"bra and underwear\" areas?  NO  If necessary, do we have your permission to call and leave a detailed message on your Preferred Phone number that includes your specific medical information?  Yes      Allergies   Allergen Reactions   • Morphine Hives      Current Outpatient Medications:   •  clobetasol (TEMOVATE) 0.05 % cream, Apply topically 2 (two) times a day, Disp: 60 g, Rfl: 3  •  doxycycline (PERIOSTAT) 20 MG tablet, Take 1 tablet (20 mg total) by mouth 2 (two) times a day (Patient not taking: Reported on 5/16/2024), Disp: 60 tablet, Rfl: 1  •  gabapentin (NEURONTIN) 300 mg capsule, Take 1 capsule (300 mg total) by mouth daily at bedtime, Disp: 30 " "capsule, Rfl: 1  •  Linaclotide (LINZESS) 290 MCG CAPS, Take 290 mcg by mouth daily as needed, Disp: , Rfl:   •  meloxicam (Mobic) 15 mg tablet, Take 1 tablet (15 mg total) by mouth daily (Patient not taking: Reported on 5/16/2024), Disp: 30 tablet, Rfl: 0        Whom besides the patient is providing clinical information about today's encounter?   NO ADDITIONAL HISTORIAN (patient alone provided history)    Physical Exam and Assessment/Plan by Diagnosis:    Alopecia, scarring versus non scarring    Physical Exam:  (Anatomic Location); (Size and Morphological Description); (Differential Diagnosis):  Left temporal scalp; Ophiasis like alopecia with tight skin over area of hair loss; alopecia areata vs scarring alopecia  Pertinent Positives: sparce eyebrows  Pertinent Negatives:    Additional History of Present Condition:  patient presents for biopsy today for scarring alopecia    Assessment and Plan:  I have discussed with the patient that a sample of skin via a \"skin biopsy” would be potentially helpful to further make a specific diagnosis under the microscope.  Based on a thorough discussion of this condition and the management approach to it (including a comprehensive discussion of the known risks, side effects and potential benefits of treatment), the patient (family) agrees to implement the following specific plan:    Procedure:  Skin Biopsy.  After a thorough discussion of treatment options and risk/benefits/alternatives (including but not limited to local pain, scarring, dyspigmentation, blistering, possible superinfection, and inability to confirm a diagnosis via histopathology), verbal and written consent were obtained and portion of the rash was biopsied for tissue sample.  See below for consent that was obtained from patient and subsequent Procedure Note.    PROCEDURE punch BIOPSY NOTE:    Performing Physician:   Anatomic Location; Clinical Description with size (cm); Pre-Op Diagnosis:   Left " "temporal scalp; Ophiasis like alopecia with tight skin over area of hair loss; alopecia areata vs scarring alopecia  Post-op diagnosis: Same     Local anesthesia: 2% Xylocaine with epi     Topical anesthesia: None    Hemostasis: suture       After obtaining informed consent  at which time there was a discussion about the purpose of biopsy  and low risks of infection and bleeding.  The area was prepped and draped in the usual fashion. Anesthesia was obtained with 1% lidocaine with epinephrine. A shave biopsy to an appropriate sampling depth was obtained by Punch. The resulting wound was covered with surgical ointment and bandaged appropriately.     The patient tolerated the procedure well without complications and was without signs of functional compromise.      Specimen has been sent for review by Dermatopathology.    Standard post-procedure care has been explained and has been included in written form within the patient's copy of Informed Consent.    INFORMED CONSENT DISCUSSION AND POST-OPERATIVE INSTRUCTIONS FOR PATIENT    I.  RATIONALE FOR PROCEDURE  I understand that a skin biopsy allows the Dermatologist to test a lesion or rash under the microscope to obtain a diagnosis.  It usually involves numbing the area with numbing medication and removing a small piece of skin; sometimes the area will be closed with sutures. In this specific procedure, sutures are not usually needed.  If any sutures are placed, then they are usually need to be removed in 2 weeks or less.    I understand that my Dermatologist recommends that a skin \"shave\" biopsy be performed today.  A local anesthetic, similar to the kind that a dentist uses when filling a cavity, will be injected with a very small needle into the skin area to be sampled.  The injected skin and tissue underneath \"will go to sleep” and become numb so no pain should be felt afterwards.  An instrument shaped like a tiny \"razor blade\" (shave biopsy instrument) will be used " "to cut a small piece of tissue and skin from the area so that a sample of tissue can be taken and examined more closely under the microscope.  A slight amount of bleeding will occur, but it will be stopped with direct pressure and a pressure bandage and any other appropriate methods.  I understands that a scar will form where the wound was created.  Surgical ointment will be applied to help protect the wound.  Sutures are not usually needed.    II.  RISKS AND POTENTIAL COMPLICATIONS   I understand the risks and potential complications of a skin biopsy include but are not limited to the following:  Bleeding  Infection  Pain  Scar/keloid  Skin discoloration  Incomplete Removal  Recurrence  Nerve Damage/Numbness/Loss of Function  Allergic Reaction to Anesthesia  Biopsies are diagnostic procedures and based on findings additional treatment or evaluation may be required  Loss or destruction of specimen resulting in no additional findings    My Dermatologist has explained to me the nature of the condition, the nature of the procedure, and the benefits to be reasonably expected compared with alternative approaches.  My Dermatologist has discussed the likelihood of major risks or complications of this procedure including the specific risks listed above, such as bleeding, infection, and scarring/keloid.  I understand that a scar is expected after this procedure.  I understand that my physician cannot predict if the scar will form a \"keloid,\" which extends beyond the borders of the wound that is created.  A keloid is a thick, painful, and bumpy scar.  A keloid can be difficult to treat, as it does not always respond well to therapy, which includes injecting cortisone directly into the keloid every few weeks.  While this usually reduces the pain and size of the scar, it does not eliminate it.      I understand that photographs may be taken before and after the procedure.  These will be maintained as part of the medical " "providers confidential records and may not be made available to me.  I further authorize the medical provider to use the photographs for teaching purposes or to illustrate scientific papers, books, or lectures if in his/her judgment, medical research, education, or science may benefit from its use.    I have had an opportunity to fully inquire about the risks and benefits of this procedure and its alternatives.   I have been given ample time and opportunity to ask questions and to seek a second opinion if I wished to do so.  I acknowledge that there have specifically been no guarantees as to the cosmetic results from the procedure.  I am aware that with any procedure there is always the possibility of an unexpected complication.    III. POST-PROCEDURAL CARE (WHAT YOU WILL NEED TO DO \"AFTER THE BIOPSY\" TO OPTIMIZE HEALING)    Keep the area clean and dry.  Try NOT to remove the bandage or get it wet for the first 24 hours.    Gently clean the area and apply surgical ointment (such as Vaseline petrolatum ointment, which is available \"over the counter\" and not a prescription) to the biopsy site for up to 2 weeks straight.  This acts to protect the wound from the outside world.      Sutures are not usually placed in this procedure.  If any sutures were placed, return for suture removal as instructed (generally 1 week for the face, 2 weeks for the body).      Take Acetaminophen (Tylenol) for discomfort, if no contraindications.  Ibuprofen or aspirin could make bleeding worse.    Call our office immediately for signs of infection: fever, chills, increased redness, warmth, tenderness, discomfort/pain, or pus or foul smell coming from the wound.    WHAT TO DO IF THERE IS ANY BLEEDING?  If a small amount of bleeding is noticed, place a clean cloth over the area and apply firm pressure for ten minutes.  Check the wound after 10 minutes of direct pressure.  If bleeding persists, try one more time for an additional 10 minutes of " direct pressure on the area.  If the bleeding becomes heavier or does not stop after the second attempt, or if you have any other questions about this procedure, then please call your [facility] Dermatologist by calling [phone] (SKIN).     I hereby acknowledge that I have reviewed and verified the site with my Dermatologist and have requested and authorized my Dermatologist to proceed with the procedure.    Scribe Attestation    I,:  Verito Tai am acting as a scribe while in the presence of the attending physician.:       I,:  José Miguel Rivera MD personally performed the services described in this documentation    as scribed in my presence.:

## 2024-06-27 NOTE — PATIENT INSTRUCTIONS
"INFORMED CONSENT DISCUSSION AND POST-OPERATIVE INSTRUCTIONS FOR PATIENT    I.  RATIONALE FOR PROCEDURE  I understand that a skin biopsy allows the Dermatologist to test a lesion or rash under the microscope to obtain a diagnosis.  It usually involves numbing the area with numbing medication and removing a small piece of skin; sometimes the area will be closed with sutures. In this specific procedure, sutures are not usually needed.  If any sutures are placed, then they are usually need to be removed in 2 weeks or less.    I understand that my Dermatologist recommends that a skin \"shave\" biopsy be performed today.  A local anesthetic, similar to the kind that a dentist uses when filling a cavity, will be injected with a very small needle into the skin area to be sampled.  The injected skin and tissue underneath \"will go to sleep” and become numb so no pain should be felt afterwards.  An instrument shaped like a tiny \"razor blade\" (shave biopsy instrument) will be used to cut a small piece of tissue and skin from the area so that a sample of tissue can be taken and examined more closely under the microscope.  A slight amount of bleeding will occur, but it will be stopped with direct pressure and a pressure bandage and any other appropriate methods.  I understands that a scar will form where the wound was created.  Surgical ointment will be applied to help protect the wound.  Sutures are not usually needed.    II.  RISKS AND POTENTIAL COMPLICATIONS   I understand the risks and potential complications of a skin biopsy include but are not limited to the following:  Bleeding  Infection  Pain  Scar/keloid  Skin discoloration  Incomplete Removal  Recurrence  Nerve Damage/Numbness/Loss of Function  Allergic Reaction to Anesthesia  Biopsies are diagnostic procedures and based on findings additional treatment or evaluation may be required  Loss or destruction of specimen resulting in no additional findings    My Dermatologist " "has explained to me the nature of the condition, the nature of the procedure, and the benefits to be reasonably expected compared with alternative approaches.  My Dermatologist has discussed the likelihood of major risks or complications of this procedure including the specific risks listed above, such as bleeding, infection, and scarring/keloid.  I understand that a scar is expected after this procedure.  I understand that my physician cannot predict if the scar will form a \"keloid,\" which extends beyond the borders of the wound that is created.  A keloid is a thick, painful, and bumpy scar.  A keloid can be difficult to treat, as it does not always respond well to therapy, which includes injecting cortisone directly into the keloid every few weeks.  While this usually reduces the pain and size of the scar, it does not eliminate it.      I understand that photographs may be taken before and after the procedure.  These will be maintained as part of the medical providers confidential records and may not be made available to me.  I further authorize the medical provider to use the photographs for teaching purposes or to illustrate scientific papers, books, or lectures if in his/her judgment, medical research, education, or science may benefit from its use.    I have had an opportunity to fully inquire about the risks and benefits of this procedure and its alternatives.   I have been given ample time and opportunity to ask questions and to seek a second opinion if I wished to do so.  I acknowledge that there have specifically been no guarantees as to the cosmetic results from the procedure.  I am aware that with any procedure there is always the possibility of an unexpected complication.    III. POST-PROCEDURAL CARE (WHAT YOU WILL NEED TO DO \"AFTER THE BIOPSY\" TO OPTIMIZE HEALING)    Keep the area clean and dry.  Try NOT to remove the bandage or get it wet for the first 24 hours.    Gently clean the area and apply " "surgical ointment (such as Vaseline petrolatum ointment, which is available \"over the counter\" and not a prescription) to the biopsy site for up to 2 weeks straight.  This acts to protect the wound from the outside world.      Sutures are not usually placed in this procedure.  If any sutures were placed, return for suture removal as instructed (generally 1 week for the face, 2 weeks for the body).      Take Acetaminophen (Tylenol) for discomfort, if no contraindications.  Ibuprofen or aspirin could make bleeding worse.    Call our office immediately for signs of infection: fever, chills, increased redness, warmth, tenderness, discomfort/pain, or pus or foul smell coming from the wound.    WHAT TO DO IF THERE IS ANY BLEEDING?  If a small amount of bleeding is noticed, place a clean cloth over the area and apply firm pressure for ten minutes.  Check the wound after 10 minutes of direct pressure.  If bleeding persists, try one more time for an additional 10 minutes of direct pressure on the area.  If the bleeding becomes heavier or does not stop after the second attempt, or if you have any other questions about this procedure, then please call your [facility]'s Dermatologist by calling [phone] (SKIN).     I hereby acknowledge that I have reviewed and verified the site with my Dermatologist and have requested and authorized my Dermatologist to proceed with the procedure.  "

## 2024-06-27 NOTE — LETTER
June 27, 2024     Patient: Bisi Orozco  YOB: 1980  Date of Visit: 6/27/2024      To Whom it May Concern:    Bisi Orozco is under my professional care. Bisi was seen in my office on 6/27/2024. Bisi may return to work on 6/28/2024 .    No heavy lifting until Monday, July 1, 2024    If you have any questions or concerns, please don't hesitate to call.         Sincerely,          José Miguel Rivera MD     Thanks for visiting us today!    You were given a packet of information handouts at today's well child exam. Please keep them handy for future reference.        Remember these important phone numbers:    [phone] for phone nurses during the day and our nurse answering service at night    [phone]  for scheduling or changing future appointments    When leaving a message for our staff, please include:   • the spelling of your child’s full name and date of birth  • your full name and relationship to child  • best phone number and time to reach you   • reason for the call    Is your child signed up for [employer]? If you do this, you can message us rather than playing phone tag! Go to your own account first. On the right hand side of your page, click the button marked \"Request Access to my Child's Records.\" Fill out the information. In a few day's your child's information will be linked to your account. It's that simple!! Here is the website for more information:     [email or web address]    If you haven't already liked us on [employer], please do so!  Just search for \"[facility]\"      --------------------------------------------------------------------------------------------------------------------

## 2024-06-28 NOTE — TELEPHONE ENCOUNTER
Called patient , no answer , left voice message requesting a call back to reschedule suture removal at [place] if possible .

## 2024-06-28 NOTE — TELEPHONE ENCOUNTER
Patient called again with questions, refused to be transferred over to the clinical line, per patient, SR appt has been cancelled.

## 2024-07-01 ENCOUNTER — TELEPHONE (OUTPATIENT)
Age: 44
End: 2024-07-01

## 2024-07-01 NOTE — TELEPHONE ENCOUNTER
PT calling requesting a refill on   tirzepatide (Zepbound) 7.5 mg/0.5 mL auto-injector  not on active med list please assist

## 2024-07-02 DIAGNOSIS — E66.9 OBESITY, CLASS I, BMI 30-34.9: Primary | ICD-10-CM

## 2024-07-02 PROCEDURE — 88305 TISSUE EXAM BY PATHOLOGIST: CPT | Performed by: STUDENT IN AN ORGANIZED HEALTH CARE EDUCATION/TRAINING PROGRAM

## 2024-07-02 PROCEDURE — 88313 SPECIAL STAINS GROUP 2: CPT | Performed by: STUDENT IN AN ORGANIZED HEALTH CARE EDUCATION/TRAINING PROGRAM

## 2024-07-02 RX ORDER — TIRZEPATIDE 7.5 MG/.5ML
7.5 INJECTION, SOLUTION SUBCUTANEOUS WEEKLY
Qty: 2 ML | Refills: 0 | Status: SHIPPED | OUTPATIENT
Start: 2024-07-02 | End: 2024-07-30

## 2024-07-03 ENCOUNTER — NURSE TRIAGE (OUTPATIENT)
Age: 44
End: 2024-07-03

## 2024-07-03 ENCOUNTER — TELEPHONE (OUTPATIENT)
Age: 44
End: 2024-07-03

## 2024-07-03 NOTE — TELEPHONE ENCOUNTER
Pt calling for path results    Advised pt of Dr Rivera's note    Tfrd call to clinical for further information, Shayna took call

## 2024-07-03 NOTE — TELEPHONE ENCOUNTER
"Pt warm transferred from [place] to this CTS-RN.      José Miguel Rivera MD  7/3/2024 12:33 PM EDT       DERMATOPATHOLOGY RESULT NOTE     Results reviewed by ordering physician.  Called patient to personally discuss results. No answer, left voicemail with result. Note scarring alopecia. Not alopecia areata .  Needs ov to consider options like plquenil and continued intralesional.        Instructions for Clinical Derm Team:  (remember to route Result Note to appropriate staff):    Call patient and schedule for OV me or Manju if not alread scheduled     Result & Plan by Specimen:     Specimen A: benign  Plan: Note scarring alopecia. Not alopecia areata .  Needs ov to consider options like plquenil and continued intralesional.     Final Diagnosis  A. Skin, left temporal scalp, punch biopsy:     Significant reduction in total number of hair follicles and scattered foci of dermal fibrosis (see note).     Note: Significant inflammation is not seen. 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 Clinical pathologic correlation is advised. Colloidal iron and PAS stains were reviewed. Multiple levels examined.       Pt seeking tissue exam pathology results. Dr. Rivera's response read to Pt. Questions answered. Pt has appt schedule with MISSY Nunes and looks forward to discussing treatment options. No further action needed at this time.     Reason for Disposition   Information only question and nurse able to answer    Answer Assessment - Initial Assessment Questions  1. REASON FOR CALL or QUESTION: \"What is your reason for calling today?\" or \"How can I best help you?\" or \"What question do you have that I can help answer?\"      Tissue exam results    Protocols used: Information Only Call - No Triage-ADULT-[place]    "

## 2024-07-03 NOTE — RESULT ENCOUNTER NOTE
DERMATOPATHOLOGY RESULT NOTE    Results reviewed by ordering physician.  Called patient to personally discuss results. No answer, left voicemail with result. Note scarring alopecia. Not alopecia areata .  Needs ov to consider options like plquenil and continued intralesional.      Instructions for Clinical Derm Team:   (remember to route Result Note to appropriate staff):    Call patient and schedule for OV me or Manju if not alread scheduled    Result & Plan by Specimen:    Specimen A: benign  Plan: Note scarring alopecia. Not alopecia areata .  Needs ov to consider options like plquenil and continued intralesional.    Final Diagnosis  A. Skin, left temporal scalp, punch biopsy:    Significant reduction in total number of hair follicles and scattered foci of dermal fibrosis (see note).    Note: Significant inflammation is not seen. 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 Clinical pathologic correlation is advised. Colloidal iron and PAS stains were reviewed. Multiple levels examined.

## 2024-07-13 ENCOUNTER — OFFICE VISIT (OUTPATIENT)
Dept: URGENT CARE | Age: 44
End: 2024-07-13
Payer: COMMERCIAL

## 2024-07-13 VITALS
TEMPERATURE: 97.7 F | WEIGHT: 150 LBS | HEART RATE: 68 BPM | SYSTOLIC BLOOD PRESSURE: 109 MMHG | RESPIRATION RATE: 18 BRPM | BODY MASS INDEX: 25.75 KG/M2 | OXYGEN SATURATION: 100 % | DIASTOLIC BLOOD PRESSURE: 72 MMHG

## 2024-07-13 DIAGNOSIS — Z48.02 ENCOUNTER FOR REMOVAL OF SUTURES: Primary | ICD-10-CM

## 2024-07-13 PROCEDURE — 99214 OFFICE O/P EST MOD 30 MIN: CPT | Performed by: EMERGENCY MEDICINE

## 2024-07-13 NOTE — PROGRESS NOTES
[facility]        NAME: Bisi Orozco is a 44 y.o. female  : 1980    MRN: [id number]  DATE: 2024  TIME: 3:11 PM    Assessment and Plan   Encounter for removal of sutures [Z48.02]  1. Encounter for removal of sutures          Suture removal    Date/Time: 2024 9:30 AM    Performed by: Tristin Hayes DO  Authorized by: Tristin Hayes DO  Universal Protocol:  Risks and benefits: risks, benefits and alternatives were discussed  Timeout called at: 2024 9:30 AM.  Patient understanding: patient states understanding of the procedure being performed  Patient consent: the patient's understanding of the procedure matches consent given  Procedure consent: procedure consent matches procedure scheduled  Relevant documents: relevant documents present and verified  Test results: test results available and properly labeled  Site marked: the operative site was marked  Radiology Images displayed and confirmed. If images not available, report reviewed: imaging studies available  Required items: required blood products, implants, devices, and special equipment available  Patient identity confirmed: verbally with patient, hospital-assigned identification number and anonymous protocol, patient vented/unresponsive      Patient location:  Clinic  Location:     Laterality:  Left    Location:  Head/neck    Head/neck location:  Scalp  Procedure details:     Tools used:  Suture removal kit    Wound appearance:  No sign(s) of infection    Number of sutures removed:  1  Post-procedure details:     Post-removal:  No dressing applied    Patient tolerance of procedure:  Tolerated well, no immediate complications        Patient Instructions       Follow up with PCP in 3-5 days.  Proceed to  ER if symptoms worsen.    If tests have been performed at [facility], our office will contact you with results if changes need to be made to the care plan discussed with you at the visit.  You can review your  full results on [facility].    Chief Complaint     Chief Complaint   Patient presents with    Suture / Staple Removal     Left side of scalp         History of Present Illness       44-year-old female presents for suture removal.  Patient underwent suture placed in the left parietal scalp region status post punch biopsy on 6/27/2024 by her dermatologist.  She denies any fevers, bleeding, purulent drainage.    Suture / Staple Removal  The sutures were placed 11 to 14 days ago. She tried nothing since the wound repair. There has been no drainage from the wound. There is no redness present. There is no swelling present. There is no pain present.       Review of Systems   Review of Systems   Constitutional:  Negative for activity change, appetite change, chills, diaphoresis, fatigue, fever and unexpected weight change.   HENT:  Negative for ear pain, rhinorrhea, sinus pressure, sinus pain, sneezing, sore throat, tinnitus, trouble swallowing and voice change.    Eyes:  Negative for photophobia, pain, discharge, redness, itching and visual disturbance.   Respiratory:  Negative for apnea, cough, choking, chest tightness, shortness of breath and stridor.    Cardiovascular:  Negative for chest pain, palpitations and leg swelling.   Gastrointestinal:  Negative for abdominal distention, abdominal pain, anal bleeding, blood in stool, constipation, diarrhea, nausea, rectal pain and vomiting.   Genitourinary:  Negative for dysuria and hematuria.   Musculoskeletal:  Negative for arthralgias and back pain.   Skin:  Negative for color change and rash.   Neurological:  Negative for dizziness, tremors, seizures, syncope, facial asymmetry, speech difficulty, weakness, light-headedness, numbness and headaches.   All other systems reviewed and are negative.        Current Medications       Current Outpatient Medications:     doxycycline (PERIOSTAT) 20 MG tablet, Take 1 tablet (20 mg total) by mouth 2 (two) times a day, Disp: 60  tablet, Rfl: 1    tirzepatide (Zepbound) 7.5 mg/0.5 mL auto-injector, Inject 0.5 mL (7.5 mg total) under the skin once a week for 28 days, Disp: 2 mL, Rfl: 0    clobetasol (TEMOVATE) 0.05 % cream, Apply topically 2 (two) times a day (Patient not taking: Reported on 7/13/2024), Disp: 60 g, Rfl: 3    gabapentin (NEURONTIN) 300 mg capsule, Take 1 capsule (300 mg total) by mouth daily at bedtime, Disp: 30 capsule, Rfl: 1    Linaclotide (LINZESS) 290 MCG CAPS, Take 290 mcg by mouth daily as needed, Disp: , Rfl:     meloxicam (Mobic) 15 mg tablet, Take 1 tablet (15 mg total) by mouth daily (Patient not taking: Reported on 5/16/2024), Disp: 30 tablet, Rfl: 0    Current Allergies     Allergies as of 07/13/2024 - Reviewed 06/27/2024   Allergen Reaction Noted    Morphine Hives 03/27/2018            The following portions of the patient's history were reviewed and updated as appropriate: allergies, current medications, past family history, past medical history, past social history, past surgical history and problem list.     No past medical history on file.    Past Surgical History:   Procedure Laterality Date    HYSTERECTOMY      For bleeding    TUBAL LIGATION      TUBAL LIGATION         Family History   Problem Relation Age of Onset    Diabetes Father          Medications have been verified.        Objective   /72   Pulse 68   Temp 97.7 °F (36.5 °C)   Resp 18   Wt 68 kg (150 lb)   SpO2 100%   BMI 25.75 kg/m²   No LMP recorded. Patient has had a hysterectomy.       Physical Exam     Physical Exam  Vitals and nursing note reviewed.   Constitutional:       General: She is not in acute distress.     Appearance: Normal appearance. She is normal weight. She is not ill-appearing, toxic-appearing or diaphoretic.   HENT:      Head: Normocephalic and atraumatic.        Right Ear: External ear normal.      Left Ear: External ear normal.      Nose: Nose normal.      Mouth/Throat:      Mouth: Mucous membranes are moist.    Cardiovascular:      Rate and Rhythm: Normal rate and regular rhythm.      Pulses:           Carotid pulses are 2+ on the right side and 2+ on the left side.       Radial pulses are 2+ on the right side and 2+ on the left side.   Pulmonary:      Effort: Pulmonary effort is normal.   Musculoskeletal:      Cervical back: Normal range of motion and neck supple.   Skin:     General: Skin is warm and dry.      Coloration: Skin is not jaundiced or pale.      Findings: No bruising, erythema, lesion or rash.   Neurological:      General: No focal deficit present.      Mental Status: She is alert and oriented to person, place, and time.   Psychiatric:         Mood and Affect: Mood normal.         Behavior: Behavior normal.

## 2024-07-18 ENCOUNTER — OFFICE VISIT (OUTPATIENT)
Dept: BARIATRICS | Facility: CLINIC | Age: 44
End: 2024-07-18
Payer: COMMERCIAL

## 2024-07-18 VITALS
TEMPERATURE: 98.3 F | HEIGHT: 64 IN | RESPIRATION RATE: 16 BRPM | DIASTOLIC BLOOD PRESSURE: 64 MMHG | SYSTOLIC BLOOD PRESSURE: 100 MMHG | BODY MASS INDEX: 25.71 KG/M2 | HEART RATE: 65 BPM | WEIGHT: 150.6 LBS

## 2024-07-18 DIAGNOSIS — E66.9 OBESITY, CLASS I, BMI 30-34.9: Primary | ICD-10-CM

## 2024-07-18 DIAGNOSIS — R73.03 PREDIABETES: ICD-10-CM

## 2024-07-18 PROCEDURE — 99213 OFFICE O/P EST LOW 20 MIN: CPT | Performed by: INTERNAL MEDICINE

## 2024-07-18 NOTE — PATIENT INSTRUCTIONS
Increase zepbound to 10mg weekly next month.   Nurse visit in 2 months.  If you are maintaining your goal weight then we will reduce zepbound to 5mg weekly.  Follow-up in 4 months.  If you plan to stop zepbound and wish to be bridged onto a different medication to help you maintain the weight loss, we discussed starting bupropion and naltrexone  (off label).

## 2024-07-18 NOTE — PROGRESS NOTES
"Assessment/Plan:  Bisi was seen today for follow-up.    Diagnoses and all orders for this visit:    Obesity, Class I, BMI 30-34.9    Prediabetes     Increase zepbound to 10mg weekly next month.   Nurse visit in 2 months.  If you are maintaining your goal weight then we will reduce zepbound to 5mg weekly.  Follow-up in 4 months.  If you plan to stop zepbound and wish to be bridged onto a different medication to help you maintain the weight loss, we discussed starting bupropion and naltrexone  (off label).    Total time spent reviewing chart, interviewing patient, examining patient, discussing plan, answering all questions, and documentin min.       ______________________________________________________________________        Subjective:   Chief Complaint   Patient presents with   • Follow-up     MWM- 4 mo f/u; Waist 30in     Patient here to discuss weight associated problems and nutrition goals  HPI: Bisi Orozco  is a 44 y.o. female with history of prediabetes and excess weight.  Weight loss plan:  Conservative Program.   Most recent notes and records were reviewed.    Wt Readings from Last 10 Encounters:   24 68.3 kg (150 lb 9.6 oz)   24 68 kg (150 lb)   24 68.5 kg (151 lb)   24 72.5 kg (159 lb 12.8 oz)   24 72.1 kg (159 lb)   24 74.8 kg (165 lb)   24 75.2 kg (165 lb 12.8 oz)   24 77.7 kg (171 lb 3.2 oz)   24 80.7 kg (178 lb)   24 82.6 kg (182 lb)     Onset:  1-2 years ago    Highest weight: 182  Previous weight: 171 (Zepbound start weight)  Current weight:  150  Change in weight: -21 lb (12.3% reduction of TBW )  Goal: 140    Zepbound tolerated fine.  Just had the first Zepbound 7.5mg dose on Monday.  No AR or SE.      Hunger/Cravings: Reduce  Dining out: Limited  Hydration: over 64 oz of water a day  Alcohol:  No  Exercise:  \"A lot of walking\" (works at the [employer]  Sleep: \"I sleep good\" 7 hours, restful sleep  Weight " "Monitoring: Yes      Patient denies personal and family history of  pancreatitis, thyroid cancer, MEN-2 tumors.  Denies any hx of glaucoma, seizures, kidney stones, gallstones.    Review Of Systems:  Review of Systems   Constitutional:  Negative for activity change, appetite change, fatigue and fever.   Respiratory:  Negative for cough and shortness of breath.    Cardiovascular:  Negative for chest pain, palpitations and leg swelling.   Gastrointestinal:  Negative for abdominal pain, constipation, diarrhea, nausea and vomiting.   Endocrine: Negative for cold intolerance and heat intolerance.   Genitourinary:  Negative for difficulty urinating and dysuria.   Musculoskeletal:  Negative for arthralgias, back pain and gait problem.   Skin:  Negative for pallor and rash.   Neurological:  Negative for headaches.   Psychiatric/Behavioral:  Negative for dysphoric mood, sleep disturbance and suicidal ideas (or HI). The patient is not nervous/anxious.        Objective:  /64   Pulse 65   Temp 98.3 °F (36.8 °C)   Resp 16   Ht 5' 4\" (1.626 m)   Wt 68.3 kg (150 lb 9.6 oz)   BMI 25.85 kg/m²   Physical Exam  Vitals and nursing note reviewed.   Constitutional:       General: She is not in acute distress.     Appearance: Normal appearance. She is not ill-appearing or diaphoretic.   Eyes:      General: No scleral icterus.  Cardiovascular:      Rate and Rhythm: Normal rate and regular rhythm.      Pulses: Normal pulses.      Heart sounds: No murmur heard.  Pulmonary:      Effort: Pulmonary effort is normal. No respiratory distress.      Breath sounds: Normal breath sounds. No wheezing or rhonchi.   Musculoskeletal:      Cervical back: Neck supple.      Right lower leg: No edema.      Left lower leg: No edema.   Lymphadenopathy:      Cervical: No cervical adenopathy.   Skin:     Capillary Refill: Capillary refill takes less than 2 seconds.      Findings: No lesion or rash.   Neurological:      Mental Status: She is alert and " oriented to person, place, and time.      Gait: Gait normal.   Psychiatric:         Mood and Affect: Mood normal.         Behavior: Behavior normal.     Labs and Imaging  Recent labs and imaging have been personally reviewed.  Lab Results   Component Value Date    WBC 3.78 (L) 05/14/2023    HGB 12.9 05/14/2023    HCT 39.0 05/14/2023    MCV 98 05/14/2023     05/14/2023     Lab Results   Component Value Date    SODIUM 138 01/28/2023    K 4.9 01/28/2023     01/28/2023    CO2 30 01/28/2023    AGAP 7 10/06/2018    BUN 18 01/28/2023    CREATININE 1.00 (H) 01/28/2023    GLUC 93 01/28/2023    GLUF 86 10/06/2018    CALCIUM 9.3 01/28/2023    AST 17 01/28/2023    ALT 11 01/28/2023    ALKPHOS 41 01/28/2023    TP 7.1 01/28/2023    TBILI 0.5 01/28/2023    EGFR 71 01/28/2023     Lab Results   Component Value Date    HGBA1C 5.9 (H) 04/27/2024     Lab Results   Component Value Date    TSH 1.16 01/09/2021     Lab Results   Component Value Date    CHOLESTEROL 182 04/27/2024     Lab Results   Component Value Date    HDL 88 04/27/2024     Lab Results   Component Value Date    TRIG 49 04/27/2024     Lab Results   Component Value Date    LDLCALC 84 04/27/2024

## 2024-07-30 ENCOUNTER — TELEPHONE (OUTPATIENT)
Age: 44
End: 2024-07-30

## 2024-07-30 DIAGNOSIS — E66.9 OBESITY, CLASS I, BMI 30-34.9: Primary | ICD-10-CM

## 2024-07-30 RX ORDER — TIRZEPATIDE 7.5 MG/.5ML
7.5 INJECTION, SOLUTION SUBCUTANEOUS WEEKLY
Qty: 2 ML | Refills: 1 | Status: SHIPPED | OUTPATIENT
Start: 2024-07-30 | End: 2024-09-24

## 2024-07-30 NOTE — TELEPHONE ENCOUNTER
Patient called the RX Refill Line. Message is being forwarded to the office.     Patient is requesting a refill of tirzepatide (Zepbound) 7.5 mg/0.5 mL auto-injector. Would like that sent to [facility] [address] Out of medication. Not on active med list to que up.      Please contact patient at [phone] if any issues.

## 2024-08-05 RX ORDER — TIRZEPATIDE 10 MG/.5ML
10 INJECTION, SOLUTION SUBCUTANEOUS WEEKLY
Qty: 2 ML | Refills: 1 | Status: SHIPPED | OUTPATIENT
Start: 2024-08-05 | End: 2024-09-30

## 2024-08-14 ENCOUNTER — TELEPHONE (OUTPATIENT)
Age: 44
End: 2024-08-14

## 2024-08-14 NOTE — TELEPHONE ENCOUNTER
2nd call from patient she was speaking with another PEP and call was dropped. I offered patient 2 appt times 8/21 @ 315 and 9/9 @ 545 due to needing a late appt. It was quite for a moment then caller disconnected call.

## 2024-08-20 ENCOUNTER — OFFICE VISIT (OUTPATIENT)
Dept: DERMATOLOGY | Facility: CLINIC | Age: 44
End: 2024-08-20
Payer: COMMERCIAL

## 2024-08-20 VITALS — BODY MASS INDEX: 24.92 KG/M2 | TEMPERATURE: 97 F | HEIGHT: 64 IN | WEIGHT: 146 LBS

## 2024-08-20 DIAGNOSIS — L66.1 FRONTAL FIBROSING ALOPECIA: Primary | ICD-10-CM

## 2024-08-20 PROCEDURE — 99214 OFFICE O/P EST MOD 30 MIN: CPT

## 2024-08-20 RX ORDER — FINASTERIDE 5 MG/1
TABLET, FILM COATED ORAL
Qty: 90 TABLET | Refills: 0 | Status: SHIPPED | OUTPATIENT
Start: 2024-08-20

## 2024-08-20 NOTE — PROGRESS NOTES
"[facility] Note     Patient Name: Bisi Orozco  Encounter Date: 8/20/2024     Have you been cared for by a [facility] Dermatologist in the last 3 years and, if so, which description applies to you?    Yes.  I have been here within the last 3 years, and my medical history has NOT changed since that time.  I am FEMALE/of child-bearing potential.    REVIEW OF SYSTEMS:  Have you recently had or currently have any of the following? No changes in my recent health.   PAST MEDICAL HISTORY:  Have you personally ever had or currently have any of the following?  If \"YES,\" then please provide more detail. No changes in my medical history.   HISTORY OF IMMUNOSUPPRESSION: Do you have a history of any of the following:  Systemic Immunosuppression such as Diabetes, Biologic or Immunotherapy, Chemotherapy, Organ Transplantation, Bone Marrow Transplantation?  No     Answering \"YES\" requires the addition of the dotphrase \"IMMUNOSUPPRESSED\" as the first diagnosis of the patient's visit.   FAMILY HISTORY:  Any \"first degree relatives\" (parent, brother, sister, or child) with the following?    No changes in my family's known health.   PATIENT EXPERIENCE:    Do you want the Dermatologist to perform a COMPLETE skin exam today including a clinical examination under the \"bra and underwear\" areas?  NO  If necessary, do we have your permission to call and leave a detailed message on your Preferred Phone number that includes your specific medical information?  Yes      Allergies   Allergen Reactions    Morphine Hives      Current Outpatient Medications:     clobetasol (TEMOVATE) 0.05 % cream, Apply topically 2 (two) times a day, Disp: 60 g, Rfl: 3    Linaclotide (LINZESS) 290 MCG CAPS, Take 290 mcg by mouth if needed, Disp: , Rfl:     tirzepatide (Zepbound) 10 mg/0.5 mL auto-injector, Inject 0.5 mL (10 mg total) under the skin once a week, Disp: 2 mL, Rfl: 1    tirzepatide (Zepbound) 7.5 mg/0.5 mL auto-injector, Inject 0.5 " mL (7.5 mg total) under the skin once a week, Disp: 2 mL, Rfl: 1          Whom besides the patient is providing clinical information about today's encounter?   NO ADDITIONAL HISTORIAN (patient alone provided history)    Physical Exam and Assessment/Plan by Diagnosis:    FRONTAL FIBROSING ALOPECIA      Physical Exam:  Anatomic Location Affected:  frontal, temporal and occipital scalp   Morphological Description:  scarring hair loss in ophiasis pattern, no perifollicular erythema or scaling seen at this time.     Additional History of Present Illness: Patient states she has had this progressing hair loss for years. She has gotten intralesional steroid injections in the past. Per her prior visit with Dr. Rivera on 6/27/2024 biopsies were performed which demonstrated findings more consistent with a scarring alopecia. Patient denies any burning, itching or bumps on the scalp. Patient presents today to discuss treatment options.                          Assessment and Plan:    Start topical tofacitinib which is a TOMAS inhibitor. Risks and side effects associated with use discussed. This will be sent to [place] [facility].   START oral finasteride 2.5mg daily. Discussed associated side effects of reduced libido and mood changes. Patient denies a personal or family history of breast cancer. Patient reports a history of hysterectomy therefore no concern for pregnancy while on this medication.   Follow up 3 months WITH A PHYSICIAN.       Scribe Attestation      I,:  Nina Quintana MA am acting as a scribe while in the presence of the attending physician.:       I,:  Aria Greenberg PA-C personally performed the services described in this documentation    as scribed in my presence.:                           Scribe Attestation      I,:  Nina Quintana MA am acting as a scribe while in the presence of the attending physician.:       I,:  Aria Greenberg PA-C personally performed the services described in this  documentation    as scribed in my presence.:

## 2024-08-22 ENCOUNTER — TELEPHONE (OUTPATIENT)
Age: 44
End: 2024-08-22

## 2024-08-22 NOTE — TELEPHONE ENCOUNTER
PA for Opzelura 1.5% cream SUBMITTED     via    []CMM-KEY:   [x]Sebastian-Case ID # [id number]   []Faxed to plan   []Other website   []Phone call Case ID #     Office notes sent, clinical questions answered. Awaiting determination    Turnaround time for your insurance to make a decision on your Prior Authorization can take 7-21 business days.

## 2024-08-29 ENCOUNTER — TELEPHONE (OUTPATIENT)
Dept: OBGYN CLINIC | Facility: CLINIC | Age: 44
End: 2024-08-29

## 2024-08-29 ENCOUNTER — OFFICE VISIT (OUTPATIENT)
Dept: OBGYN CLINIC | Facility: CLINIC | Age: 44
End: 2024-08-29
Payer: COMMERCIAL

## 2024-08-29 VITALS
BODY MASS INDEX: 24.92 KG/M2 | SYSTOLIC BLOOD PRESSURE: 116 MMHG | DIASTOLIC BLOOD PRESSURE: 60 MMHG | HEIGHT: 64 IN | WEIGHT: 146 LBS

## 2024-08-29 DIAGNOSIS — M25.511 RIGHT SHOULDER PAIN, UNSPECIFIED CHRONICITY: ICD-10-CM

## 2024-08-29 DIAGNOSIS — M25.511 RIGHT SHOULDER PAIN, UNSPECIFIED CHRONICITY: Primary | ICD-10-CM

## 2024-08-29 DIAGNOSIS — G56.01 CARPAL TUNNEL SYNDROME ON RIGHT: Primary | ICD-10-CM

## 2024-08-29 DIAGNOSIS — G56.01 CARPAL TUNNEL SYNDROME ON RIGHT: ICD-10-CM

## 2024-08-29 PROCEDURE — 99213 OFFICE O/P EST LOW 20 MIN: CPT | Performed by: SURGERY

## 2024-08-29 RX ORDER — NAPROXEN 500 MG/1
500 TABLET ORAL 2 TIMES DAILY WITH MEALS
Qty: 42 TABLET | Refills: 0 | Status: SHIPPED | OUTPATIENT
Start: 2024-08-29 | End: 2024-09-19

## 2024-08-29 NOTE — PROGRESS NOTES
Assessment:    Right carpal tunnel syndrome       Plan:    At this point, surgery is recommended.  Recommend continued repeat steroid injections for this problem.  Patient would like to weigh her options and will let us know when she is ready for the procedure.  In the interm, continue night time bracing.  All questions have been answered.  Additionally, a PT referral for right shoulder pain was placed at the patient's request.          Subjective:     HPI    Patient ID:  Bisi Orozco is a 44 y.o. female here for follow-up evaluation of the right hand.  Last visit she received a steroid injection for right carpal tunnel syndrome.  Today, she states the injection helped but she is looking for repeat steroid injection today.  Additionally she notes localized right lateral shoulder pain with repeated lifting at work.  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      Initial Hand H&P:  She has known right carpal tunnel syndrome that was diagnosed a few years ago by EMG for which she was being seen at [facility] however she recently got a job here at [employer] last year and would like to establish care.  She states she has numbness and tingling of mainly the first 3 fingers of the right hand that is typically worse at night with activities.  She has received steroid injection for the carpal tunnel syndrome in the past which helps her significantly.  She has tried bracing oral medications and therapy and the injection works the best.  There is no recent injury or trauma to the area.  She denies normal numbness elsewhere on the hand.  No other complaints offered today.  She does state that she is not ready for surgery for this problem as of yet.      The following portions of the patient's history were reviewed and updated as appropriate: allergies, current medications, past family history, past medical history, past social history, past surgical history, and problem list.    Review of Systems      Objective:    Imaging: No new imaging performed for today's visit    Right hand x-rays 7/13/2023 outside facility  There are no acute fractures or dislocations. There are minimal degenerative changes to the thumb IP joint, index and middle finger DIP joint. There is mild subluxation of the first CMC joint without any significant degenerative joint disease. Carpal tunnel view is within normal limits. No other bony lesions or masses.       EMG RUE 9/18/2023  EMG: Right Upper Extremity   EMG nerve conduction studies were performed on the right upper extremity.   · Right median motor conduction response reveals a prolonged distal latency of 4.8 ms. Right ulnar motor conduction response is within normal limits.   · Right median sensory response reveals a prolonged peak latency of 5.6 ms. Right ulnar sensory response is within normal limits.   · EMG study involving muscles innervated by all nerves and myotomes throughout the right upper extremity and cervical paraspinals are within normal limits.     Summary: There is electrophysiological evidence of moderate right carpal tunnel syndrome.     Physical Exam     Vitals:    08/29/24 1023   BP: 116/60     General appearance:  NAD   Cardiac:  Regular rate  Lungs:  Unlabored breathing  Abdomen:  Non-distended    Orthopedic Examination:  Right hand     Inspection: No open wounds or erythema.  No ecchymosis or swelling.  No muscle wasting.    Palpation: Nontender to palpation bony prominences of the hand and wrist, nontender first dorsal extensor compartment    Range-of-motion: Normal wrist range of motion, full composite fist    Strength: 5/5 wrist flexion extension, , intrinsics, thumb opposition, APB    Sensation: Intact to light touch median radial ulnar nerve distribution    Special Tests: Positive Tinel's and Durkan's compression at the wrist.  Negative Finkelstein's  Palpable radial pulse  The upper extremities warm and well-perfused.

## 2024-09-11 ENCOUNTER — EVALUATION (OUTPATIENT)
Dept: PHYSICAL THERAPY | Facility: OTHER | Age: 44
End: 2024-09-11
Payer: COMMERCIAL

## 2024-09-11 DIAGNOSIS — M25.511 RIGHT SHOULDER PAIN, UNSPECIFIED CHRONICITY: ICD-10-CM

## 2024-09-11 PROCEDURE — 97161 PT EVAL LOW COMPLEX 20 MIN: CPT | Performed by: PHYSICAL THERAPIST

## 2024-09-11 PROCEDURE — 97110 THERAPEUTIC EXERCISES: CPT | Performed by: PHYSICAL THERAPIST

## 2024-09-11 PROCEDURE — 97140 MANUAL THERAPY 1/> REGIONS: CPT | Performed by: PHYSICAL THERAPIST

## 2024-09-11 NOTE — PROGRESS NOTES
PT Evaluation     Today's date: 2024  Patient name: Bisi Orozco  : 1980  MRN: [id number]  Referring provider: Elvin Acevedo PA-C  Dx:   Encounter Diagnosis     ICD-10-CM    1. Right shoulder pain, unspecified chronicity  M25.511 Ambulatory Referral to Physical Therapy          Start Time: 1515  Stop Time: 1615  Total time in clinic (min): 60 minutes    Assessment    Assessment details: Patient presents with signs and symptoms consistent with neck pain with radiating symptoms. Cervical distraction, median nerve neurodynamics and spurlings were all positive for reproduction of her symptoms.  Patients arm symptoms abolished with cervical distraction and her shoulder pain improve with cervical retraction and thoracic extension.  Patient with some mild ROM limitations in retraction and thoracic extension as well as adverse neurodynamics. Patient would benefit from PT to address these impairments and to reduce her symptoms.  Patients current symptoms are disturbing sleep and making ADLs after work difficulty.     Goals  Patient able to sleep without difficulty 4 weeks   Patient able to work a full 40 hours at work without symptoms.     Plan    Planned therapy interventions: therapeutic activities, therapeutic training and therapeutic exercise    Frequency: 2x week  Duration in weeks: 12        Subjective Evaluation    History of Present Illness  Mechanism of injury: Patient reports insideous onset of right shoulder pain that begin two weeks ago and can radiate into her UE.  Patient with history of CTS but notes recent onset of numbness into her forearm.   Patient Goals  Patient goals for therapy: decreased pain  Patient goal: patient wants to feel less pain after work  Pain  Current pain rating: 3  At worst pain ratin          Objective     Active Range of Motion   Cervical/Thoracic Spine       Cervical    Flexion:  Restriction level: minimal  Extension:  Restriction level: minimal  Left lateral  flexion:  Restriction level: minimal  Right lateral flexion:  Restriction level minimal  Left rotation:  Restriction level: minimal  Right rotation:  Restriction level: minimal    Thoracic    Extension:  Restriction level: moderate  Mechanical Assessment    Cervical    Seated retraction: repeated movements   Pain intensity: better    Thoracic      Lumbar      Strength/Myotome Testing   Cervical Spine     Left   Interossei strength (t1): 5    Right   Interossei strength (t1): 5    Left Shoulder     Planes of Motion   Abduction: 5   External rotation at 0°: 5     Right Shoulder     Planes of Motion   Abduction: 5   External rotation at 0°: 5     Left Elbow   Flexion: 5  Extension: 5    Right Elbow   Flexion: 5  Extension: 5    Left Wrist/Hand   Wrist extension: 5  Wrist flexion: 5  Thumb extension: 5    Right Wrist/Hand   Wrist extension: 5  Wrist flexion: 5  Thumb extension: 5    Tests   Cervical   Positive cervical distraction test.    Left   Negative Spurling's Test B.     Right   Positive Spurling's Test A.     Thoracic   Negative slump test.     Left Shoulder   Negative ULTT2.     Right Shoulder   Positive ULTT1.              Precautions: cervical radiculopathy       Manuals 9/11                         Cervical distraction                                        Neuro Re-Ed                                                                                                        Ther Ex                          Retraction CS X 10                          Thoracic ext  X 10            rows PTB 3 x 10                                                    Ther Activity                                       Gait Training                                       Modalities

## 2024-09-16 ENCOUNTER — APPOINTMENT (OUTPATIENT)
Dept: PHYSICAL THERAPY | Facility: OTHER | Age: 44
End: 2024-09-16
Payer: COMMERCIAL

## 2024-09-19 ENCOUNTER — CLINICAL SUPPORT (OUTPATIENT)
Dept: BARIATRICS | Facility: CLINIC | Age: 44
End: 2024-09-19

## 2024-09-19 VITALS
BODY MASS INDEX: 24.69 KG/M2 | HEIGHT: 64 IN | WEIGHT: 144.6 LBS | TEMPERATURE: 97.6 F | HEART RATE: 92 BPM | SYSTOLIC BLOOD PRESSURE: 110 MMHG | RESPIRATION RATE: 17 BRPM | DIASTOLIC BLOOD PRESSURE: 72 MMHG

## 2024-09-19 DIAGNOSIS — R63.5 ABNORMAL WEIGHT GAIN: Primary | ICD-10-CM

## 2024-09-19 PROCEDURE — RECHECK

## 2024-09-19 NOTE — PROGRESS NOTES
Patient last visit weight: 150lbs    Patient current visit weight: 144lbs     If you are taking phentermine or other oral weight loss medications, are you experiencing any of the following symptoms:  Headache:   Blurred Vision:   Chest Pain:   Palpitations:  Insomnia:   SPECIFY ORAL MEDICATION AND DOSAGE:     If you are taking an injectable medication,  are you experiencing any of the following symptoms:  Bloating: NO   Nausea: NO   Vomiting: NO   Constipation: NO   Diarrhea: NO   SPECIFY INJECTABLE MEDICATION AND CURRENT DOSAGE: Zepbound 7.5mg. Patient tolerating well.        Vitals:    Is BP less than 100/60? NO   Is BP greater than 140/90? NO   Is HR greater than 100? NO   **If yes to any of the above, have patient relax and repeat in 5-10 minutes**    Repeat values:    Is BP less than 100/60?  Is BP greater than 140/90?  Is HR greater than 100?  **If values remain outside of ranges above, please consult provider for next steps**

## 2024-09-20 ENCOUNTER — NURSE TRIAGE (OUTPATIENT)
Age: 44
End: 2024-09-20

## 2024-09-20 NOTE — TELEPHONE ENCOUNTER
"Patient calling stating that she's having cramping, white discharge, and odor. Pt reporting this started 2-3 days ago. Pt reporting 8/10 abdominal cramping. Pt reporting being doubled over in pain. Pt reporting that she also lifts heavy boxes.  Pt denies fever, itching, vaginal bleeding, pain with urination, injury to genital area, vaginal foreign body    Patient is advised to go to the emergency room for further evaluation, pt reporting that she \"doesn't have the money\", pt refuses to go at this time. Patient is warm transferred to the office as the patient is adamant  about requesting an appt, and still refusing the emergency room. Patient was notified that there are payment plans and financial assistance, pt verbalized understanding. Pt was warm transferred to Zainab in the office for further assistance.     Epic Secure Chat sent to Dr Velasco  Easiest Credit Card To Get Approved For Chat received: She no longer has a uterus, so pain is unlikely GYN in origin; she can also go to her PCP or urgent care; if she thinks her symptoms are a yeast infection, she can also try Monistat over the counter     Patient is advised of Recommendations, Pt verbalized understanding, no further questions at this time        Reason for Disposition   SEVERE abdominal pain (e.g., excruciating)    Answer Assessment - Initial Assessment Questions  1. DISCHARGE: \"Describe the discharge.\" (e.g., white, yellow, green, gray, foamy, cottage cheese-like)      White discharge   2. ODOR: \"Is there a bad odor?\"      Yes   3. ONSET: \"When did the discharge begin?\"      2-3 days ago   4. RASH: \"Is there a rash in that area?\" If Yes, ask: \"Describe it.\" (e.g., redness, blisters, sores, bumps)      Pt denies   5. ABDOMINAL PAIN: \"Are you having any abdominal pain?\" If Yes, ask: \"What does it feel like? \" (e.g., crampy, dull, intermittent, constant)       Cramping   6. ABDOMINAL PAIN SEVERITY: If present, ask: \"How bad is it?\"  (e.g., mild, moderate, severe)   - MILD - doesn't " "interfere with normal activities    - MODERATE - interferes with normal activities or awakens from sleep    - SEVERE - patient doesn't want to move (R/O peritonitis)       8/10  7. CAUSE: \"What do you think is causing the discharge?\" \"Have you had the same problem before? What happened then?\"      Pt thinks yeast infection   8. OTHER SYMPTOMS: \"Do you have any other symptoms?\" (e.g., fever, itching, vaginal bleeding, pain with urination, injury to genital area, vaginal foreign body)      Pt denies fever, itching, vaginal bleeding, pain with urination, injury to genital area, vaginal foreign body  9. PREGNANCY: \"Is there any chance you are pregnant?\" \"When was your last menstrual period?\"      Hysterectomy    Protocols used: Vaginal Discharge-ADULT-OH    "

## 2024-09-23 ENCOUNTER — TELEPHONE (OUTPATIENT)
Dept: BARIATRICS | Facility: CLINIC | Age: 44
End: 2024-09-23

## 2024-09-23 DIAGNOSIS — E66.811 OBESITY, CLASS I, BMI 30-34.9: ICD-10-CM

## 2024-09-23 DIAGNOSIS — E66.9 OBESITY, CLASS I, BMI 30-34.9: ICD-10-CM

## 2024-09-23 RX ORDER — TIRZEPATIDE 7.5 MG/.5ML
7.5 INJECTION, SOLUTION SUBCUTANEOUS WEEKLY
Qty: 2 ML | Refills: 1 | Status: SHIPPED | OUTPATIENT
Start: 2024-09-23 | End: 2024-11-18

## 2024-09-23 NOTE — TELEPHONE ENCOUNTER
Zepbound 7.5mg sent to [facility] with 1 refill.    Reviewed Dr. TAPIA's note from 7/18-- he suggested 4 months follow up with provider. She is approaching her goal weight, and med dose adjustments may be needed. Please move up appointment with me in about 2 months.

## 2024-09-26 ENCOUNTER — OFFICE VISIT (OUTPATIENT)
Dept: OBGYN CLINIC | Facility: CLINIC | Age: 44
End: 2024-09-26
Payer: COMMERCIAL

## 2024-09-26 VITALS
WEIGHT: 144.6 LBS | BODY MASS INDEX: 24.69 KG/M2 | DIASTOLIC BLOOD PRESSURE: 70 MMHG | HEIGHT: 64 IN | SYSTOLIC BLOOD PRESSURE: 112 MMHG

## 2024-09-26 DIAGNOSIS — R10.2 PELVIC PAIN: Primary | ICD-10-CM

## 2024-09-26 DIAGNOSIS — R30.0 DYSURIA: ICD-10-CM

## 2024-09-26 DIAGNOSIS — N94.9 VAGINAL BURNING: ICD-10-CM

## 2024-09-26 DIAGNOSIS — N94.89 VAGINAL BURNING: ICD-10-CM

## 2024-09-26 LAB
SL AMB  POCT GLUCOSE, UA: NEGATIVE
SL AMB LEUKOCYTE ESTERASE,UA: NEGATIVE
SL AMB POCT BILIRUBIN,UA: NEGATIVE
SL AMB POCT BLOOD,UA: NEGATIVE
SL AMB POCT CLARITY,UA: CLEAR
SL AMB POCT COLOR,UA: NORMAL
SL AMB POCT KETONES,UA: NEGATIVE
SL AMB POCT NITRITE,UA: NEGATIVE
SL AMB POCT PH,UA: 5
SL AMB POCT SPECIFIC GRAVITY,UA: 1.01
SL AMB POCT URINE PROTEIN: NEGATIVE
SL AMB POCT UROBILINOGEN: 0.2

## 2024-09-26 PROCEDURE — 87660 TRICHOMONAS VAGIN DIR PROBE: CPT | Performed by: NURSE PRACTITIONER

## 2024-09-26 PROCEDURE — 99214 OFFICE O/P EST MOD 30 MIN: CPT | Performed by: NURSE PRACTITIONER

## 2024-09-26 PROCEDURE — 87510 GARDNER VAG DNA DIR PROBE: CPT | Performed by: NURSE PRACTITIONER

## 2024-09-26 PROCEDURE — 87480 CANDIDA DNA DIR PROBE: CPT | Performed by: NURSE PRACTITIONER

## 2024-09-26 PROCEDURE — 81002 URINALYSIS NONAUTO W/O SCOPE: CPT | Performed by: NURSE PRACTITIONER

## 2024-09-26 NOTE — PROGRESS NOTES
Assessment/Plan:     1. Pelvic pain    - POCT urine dip    2. Vaginal burning    - VAGINOSIS DNA PROBE    3. Dysuria    Above symptoms are recurrent.  Lab work is consistent with menopause.  Await pelvic ultrasound result and will advise accordingly.  Repeat vaginal culture collected.  Urine dip was negative.  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    I have spent a total time of 30 minutes in caring for this patient on the day of the visit/encounter including Diagnostic results, Patient and family education, Impressions, Counseling / Coordination of care, Documenting in the medical record, Reviewing / ordering tests, medicine, procedures  , and Obtaining or reviewing history  .        Subjective:     Patient ID: Bisi Orozco is a 44 y.o. female.    HPI  PROBLEM VISIT    43 yo    Patient presents with urogenital symptoms and pelvic pain.  She was seen by Dr. Velasco 2024 for her yearly at which time she was complaining of pelvic pain and hot flashes.  Vaginal culture was negative.  Estradiol 15.7 and FSH 92.5.  Pelvic US was ordered. Patient has this scheduled on 24.    Patient reports that after seeing Dr. Velasco she used vaginal monistat and that her pain resolved.  She also reports resolution of symptoms after she uses Azo.  Today she reports pelvic pain reoccurring last week.  She also reports pain with urination and vaginal burning.  She denies vaginal discharge, foul odor or itching.  Sex can be painful.  She has know IBS, characterized by constipation, for which she takes Linzess.    Significant gynecology hx:  2017 Dx Lap w/ bilateral salpingectomies and VERONA; preop dx chronic pelvic pain; Dr Tarik Munguia.  She still has her ovaries.  Extensive adhesive disease and inflammation of her fallopian tubes.    Denies hx of STDs.  2 vaginal births    Review of Systems   Constitutional:  Negative for chills and fever.  "  Gastrointestinal:  Positive for constipation (takes linzess). Negative for diarrhea, nausea and vomiting.   Genitourinary:  Positive for dyspareunia (mild, no vaginal dryness), dysuria and pelvic pain. Negative for difficulty urinating, frequency, genital sores, hematuria, urgency, vaginal bleeding and vaginal discharge (no foul odor or itching).         Objective:    Visit Vitals  /70 (BP Location: Right arm, Patient Position: Sitting, Cuff Size: Adult)   Ht 5' 4\" (1.626 m)   Wt 65.6 kg (144 lb 9.6 oz)   BMI 24.82 kg/m²   OB Status Hysterectomy   Smoking Status Never   BSA 1.7 m²      Physical Exam  Constitutional:       General: She is not in acute distress.     Appearance: Normal appearance. She is well-developed. She is not ill-appearing or diaphoretic.      Comments: Body mass index is 24.82 kg/m².     HENT:      Head: Normocephalic and atraumatic.   Eyes:      Pupils: Pupils are equal, round, and reactive to light.   Pulmonary:      Effort: Pulmonary effort is normal.   Abdominal:      General: Abdomen is flat.      Palpations: Abdomen is soft.   Genitourinary:     General: Normal vulva.      Exam position: Lithotomy position.      Labia:         Right: No rash, tenderness, lesion or injury.         Left: No rash, tenderness, lesion or injury.       Vagina: No signs of injury and foreign body. No vaginal discharge, erythema, tenderness or bleeding.      Adnexa:         Right: No mass or tenderness.          Left: No mass or tenderness.        Comments: Patient found Q tip swab of vaginal walls to be painful.  Skin:     General: Skin is warm and dry.   Neurological:      General: No focal deficit present.      Mental Status: She is alert and oriented to person, place, and time.   Psychiatric:         Mood and Affect: Mood normal.         Behavior: Behavior normal.         Thought Content: Thought content normal.         Judgment: Judgment normal.           "

## 2024-09-28 ENCOUNTER — HOSPITAL ENCOUNTER (OUTPATIENT)
Dept: ULTRASOUND IMAGING | Facility: HOSPITAL | Age: 44
Discharge: HOME | End: 2024-09-28
Payer: COMMERCIAL

## 2024-09-28 DIAGNOSIS — R10.2 PELVIC PAIN: ICD-10-CM

## 2024-09-28 PROCEDURE — 76856 US EXAM PELVIC COMPLETE: CPT

## 2024-10-01 ENCOUNTER — TELEPHONE (OUTPATIENT)
Age: 44
End: 2024-10-01

## 2024-10-01 DIAGNOSIS — R10.84 GENERALIZED ABDOMINAL PAIN: Primary | ICD-10-CM

## 2024-10-01 NOTE — TELEPHONE ENCOUNTER
Left message making patient aware of referral and contact to make appointment for GI. Offered call back for any further questions or concerns at [phone].

## 2024-10-01 NOTE — TELEPHONE ENCOUNTER
Patient calling to report that while she was having her pelvic ultrasound, the tech had touch the spot that has been causing her pain and informed her that the spot is related to her stomach. Reviewed that if she is having GI symptoms or pain she should contact her PCP for further evaluation. Patient requesting referral to GI rather follow up with PCP as the tech already identified that that is the problem. Message to Dr. Velasco for review.

## 2024-11-05 ENCOUNTER — TELEPHONE (OUTPATIENT)
Age: 44
End: 2024-11-05

## 2024-11-05 NOTE — TELEPHONE ENCOUNTER
Spoke with pt due the provider schedule change pt was schedule on 11/19, reschedule on 11/20.  Pt agree.

## 2024-11-11 DIAGNOSIS — E66.811 OBESITY, CLASS I, BMI 30-34.9: ICD-10-CM

## 2024-11-11 NOTE — TELEPHONE ENCOUNTER
Reason for call:   [x] Refill   [] Prior Auth  [] Other:     Office:   [] PCP/Provider -   [x] Specialty/Provider - weight Sasha Hansen     Medication: Zepbound     Dose/Frequency: 7.5 mg Weekly     Quantity: 2 ml     Pharmacy: [facility] [place] [address]    Does the patient have enough for 3 days?   [] Yes   [x] No - Send as HP to POD  How are you tolerating the medication?   [] Nausea  [] Vomiting  [] Diarrhea  [x] Asymptomatic  [] Other:    Last visit weight:    Current weight:140    Date of last injection:11/04/24    How many injections do you have left:0    Would you like an increase in your dose?  [x] No patient would like to stay on same dose

## 2024-11-12 RX ORDER — TIRZEPATIDE 7.5 MG/.5ML
7.5 INJECTION, SOLUTION SUBCUTANEOUS WEEKLY
Qty: 2 ML | Refills: 0 | Status: SHIPPED | OUTPATIENT
Start: 2024-11-12 | End: 2025-01-07

## 2024-11-12 NOTE — TELEPHONE ENCOUNTER
Kvng for pt to call back and make sooner appt to see one of our providers for continued med refills

## 2024-11-12 NOTE — TELEPHONE ENCOUNTER
Patient return call from office. Patient is confused as per why she need to see a provider to get her refill, patient stated had a nurse visit 09/19/2024 and on that day she was schedule for follow up with Marc Lau 02/11/2025. Please call patient at [phone] as late as possible. [facility] [employer] if no answered leave her a message.

## 2024-11-18 ENCOUNTER — OFFICE VISIT (OUTPATIENT)
Dept: FAMILY MEDICINE CLINIC | Facility: CLINIC | Age: 44
End: 2024-11-18
Payer: COMMERCIAL

## 2024-11-18 ENCOUNTER — TELEPHONE (OUTPATIENT)
Age: 44
End: 2024-11-18

## 2024-11-18 VITALS
TEMPERATURE: 97.8 F | SYSTOLIC BLOOD PRESSURE: 94 MMHG | BODY MASS INDEX: 24.45 KG/M2 | DIASTOLIC BLOOD PRESSURE: 64 MMHG | WEIGHT: 143.2 LBS | HEART RATE: 67 BPM | OXYGEN SATURATION: 96 % | HEIGHT: 64 IN

## 2024-11-18 DIAGNOSIS — Z12.12 SCREENING FOR COLORECTAL CANCER: ICD-10-CM

## 2024-11-18 DIAGNOSIS — Z13.6 SCREENING FOR CARDIOVASCULAR CONDITION: ICD-10-CM

## 2024-11-18 DIAGNOSIS — K59.00 CONSTIPATION, UNSPECIFIED CONSTIPATION TYPE: ICD-10-CM

## 2024-11-18 DIAGNOSIS — Z00.00 ANNUAL PHYSICAL EXAM: Primary | ICD-10-CM

## 2024-11-18 DIAGNOSIS — Z12.11 SCREENING FOR COLORECTAL CANCER: ICD-10-CM

## 2024-11-18 DIAGNOSIS — Z12.31 ENCOUNTER FOR SCREENING MAMMOGRAM FOR BREAST CANCER: ICD-10-CM

## 2024-11-18 PROCEDURE — 99396 PREV VISIT EST AGE 40-64: CPT | Performed by: FAMILY MEDICINE

## 2024-11-18 PROCEDURE — 99214 OFFICE O/P EST MOD 30 MIN: CPT | Performed by: FAMILY MEDICINE

## 2024-11-18 RX ORDER — DOCUSATE SODIUM 100 MG/1
100 CAPSULE, LIQUID FILLED ORAL 2 TIMES DAILY
Qty: 60 CAPSULE | Refills: 0 | Status: SHIPPED | OUTPATIENT
Start: 2024-11-18

## 2024-11-18 NOTE — ASSESSMENT & PLAN NOTE
Orders:    Hemoglobin A1C; Future    Lipid Panel with Direct LDL reflex; Future    Comprehensive metabolic panel; Future

## 2024-11-18 NOTE — ASSESSMENT & PLAN NOTE
Colace BID, f/u with GI.  Orders:    docusate sodium (COLACE) 100 mg capsule; Take 1 capsule (100 mg total) by mouth 2 (two) times a day

## 2024-11-18 NOTE — TELEPHONE ENCOUNTER
Pt called was seen today 11/18 and forgot to ask for her mammogram script.     She is due to get her mammo.  Once script is in please contact pt so she can call and schedule.    Thank you

## 2024-11-18 NOTE — PATIENT INSTRUCTIONS
"Patient Education     Routine physical for adults   The Basics   Written by the doctors and editors at [facility]   What is a physical? -- A physical is a routine visit, or \"check-up,\" with your doctor. You might also hear it called a \"wellness visit\" or \"preventive visit.\"  During each visit, the doctor will:   Ask about your physical and mental health   Ask about your habits, behaviors, and lifestyle   Do an exam   Give you vaccines if needed   Talk to you about any medicines you take   Give advice about your health   Answer your questions  Getting regular check-ups is an important part of taking care of your health. It can help your doctor find and treat any problems you have. But it's also important for preventing health problems.  A routine physical is different from a \"sick visit.\" A sick visit is when you see a doctor because of a health concern or problem. Since physicals are scheduled ahead of time, you can think about what you want to ask the doctor.  How often should I get a physical? -- It depends on your age and health. In general, for people age 21 years and older:   If you are younger than 50 years, you might be able to get a physical every 3 years.   If you are 50 years or older, your doctor might recommend a physical every year.  If you have an ongoing health condition, like diabetes or high blood pressure, your doctor will probably want to see you more often.  What happens during a physical? -- In general, each visit will include:   Physical exam - The doctor or nurse will check your height, weight, heart rate, and blood pressure. They will also look at your eyes and ears. They will ask about how you are feeling and whether you have any symptoms that bother you.   Medicines - It's a good idea to bring a list of all the medicines you take to each doctor visit. Your doctor will talk to you about your medicines and answer any questions. Tell them if you are having any side effects that bother you. You " "should also tell them if you are having trouble paying for any of your medicines.   Habits and behaviors - This includes:   Your diet   Your exercise habits   Whether you smoke, drink alcohol, or use drugs   Whether you are sexually active   Whether you feel safe at home  Your doctor will talk to you about things you can do to improve your health and lower your risk of health problems. They will also offer help and support. For example, if you want to quit smoking, they can give you advice and might prescribe medicines. If you want to improve your diet or get more physical activity, they can help you with this, too.   Lab tests, if needed - The tests you get will depend on your age and situation. For example, your doctor might want to check your:   Cholesterol   Blood sugar   Iron level   Vaccines - The recommended vaccines will depend on your age, health, and what vaccines you already had. Vaccines are very important because they can prevent certain serious or deadly infections.   Discussion of screening - \"Screening\" means checking for diseases or other health problems before they cause symptoms. Your doctor can recommend screening based on your age, risk, and preferences. This might include tests to check for:   Cancer, such as breast, prostate, cervical, ovarian, colorectal, prostate, lung, or skin cancer   Sexually transmitted infections, such as chlamydia and gonorrhea   Mental health conditions like depression and anxiety  Your doctor will talk to you about the different types of screening tests. They can help you decide which screenings to have. They can also explain what the results might mean.   Answering questions - The physical is a good time to ask the doctor or nurse questions about your health. If needed, they can refer you to other doctors or specialists, too.  Adults older than 65 years often need other care, too. As you get older, your doctor will talk to you about:   How to prevent falling at " home   Hearing or vision tests   Memory testing   How to take your medicines safely   Making sure that you have the help and support you need at home  All topics are updated as new evidence becomes available and our peer review process is complete.  This topic retrieved from [employer] on: May 02, 2024.  Topic 007855 Version 1.0  Release: 32.4.3 - C32.122  © 2024 UpToDate, Inc. and/or its affiliates. All rights reserved.  Consumer Information Use and Disclaimer   Disclaimer: This generalized information is a limited summary of diagnosis, treatment, and/or medication information. It is not meant to be comprehensive and should be used as a tool to help the user understand and/or assess potential diagnostic and treatment options. It does NOT include all information about conditions, treatments, medications, side effects, or risks that may apply to a specific patient. It is not intended to be medical advice or a substitute for the medical advice, diagnosis, or treatment of a health care provider based on the health care provider's examination and assessment of a patient's specific and unique circumstances. Patients must speak with a health care provider for complete information about their health, medical questions, and treatment options, including any risks or benefits regarding use of medications. This information does not endorse any treatments or medications as safe, effective, or approved for treating a specific patient. UpToDate, Inc. and its affiliates disclaim any warranty or liability relating to this information or the use thereof.The use of this information is governed by the Terms of Use, available at https://www.woltersEngageuwer.com/en/know/clinical-effectiveness-terms. 2024© UpToDate, Inc. and its affiliates and/or licensors. All rights reserved.  Copyright   © 2024 UpToDate, Inc. and/or its affiliates. All rights reserved.

## 2024-11-18 NOTE — PROGRESS NOTES
Adult Annual Physical  Name: Bisi Orozco      : 1980      MRN: [id number]  Encounter Provider: Sharon Ta DO  Encounter Date: 2024   Encounter department: [facility]    Assessment & Plan  Annual physical exam         Encounter for screening mammogram for breast cancer    Orders:    Mammo screening bilateral w 3d and cad; Future    Constipation, unspecified constipation type  Colace BID, f/u with GI.  Orders:    docusate sodium (COLACE) 100 mg capsule; Take 1 capsule (100 mg total) by mouth 2 (two) times a day    Screening for colorectal cancer    Orders:    Cologuard    Screening for cardiovascular condition    Orders:    Hemoglobin A1C; Future    Lipid Panel with Direct LDL reflex; Future    Comprehensive metabolic panel; Future      Immunizations and preventive care screenings were discussed with patient today. Appropriate education was printed on patient's after visit summary.    Counseling:  Alcohol/drug use: discussed moderation in alcohol intake, the recommendations for healthy alcohol use, and avoidance of illicit drug use.  Dental Health: discussed importance of regular tooth brushing, flossing, and dental visits.  Injury prevention: discussed safety/seat belts, safety helmets, smoke detectors, carbon monoxide detectors, and smoking near bedding or upholstery.  Sexual health: discussed sexually transmitted diseases, partner selection, use of condoms, avoidance of unintended pregnancy, and contraceptive alternatives.  Exercise: the importance of regular exercise/physical activity was discussed. Recommend exercise 3-5 times per week for at least 30 minutes.       Depression Screening and Follow-up Plan: Patient was screened for depression during today's encounter. They screened negative with a PHQ-2 score of 0.        History of Present Illness     Adult Annual Physical:  Patient presents for annual physical. Constipation..     Diet and Physical Activity:  -  Diet/Nutrition: well balanced diet.  - Exercise: no formal exercise.    Depression Screening:  - PHQ-2 Score: 0    General Health:  - Sleep: sleeps well and 7-8 hours of sleep on average.  - Hearing: normal hearing bilateral ears.  - Vision: no vision problems.  - Dental: regular dental visits and brushes teeth twice daily.    /GYN Health:  - Follows with GYN: yes.   - Menopause: perimenopausal.   - History of STDs: no    Advanced Care Planning:  - Has an advanced directive?: no    - Has a durable medical POA?: no    - ACP document given to patient?: no      Review of Systems   Constitutional:  Negative for chills and fever.   HENT:  Negative for ear pain and sore throat.    Eyes:  Negative for pain and visual disturbance.   Respiratory:  Negative for cough and shortness of breath.    Cardiovascular:  Negative for chest pain and palpitations.   Gastrointestinal:  Positive for constipation. Negative for abdominal pain and vomiting.   Genitourinary:  Negative for dysuria and hematuria.   Musculoskeletal:  Negative for arthralgias and back pain.   Skin:  Negative for color change and rash.   Neurological:  Negative for seizures and syncope.   All other systems reviewed and are negative.    Medical History Reviewed by provider this encounter:  Tobacco  Allergies  Meds  Problems  Med Hx  Surg Hx  Fam Hx     .  Current Outpatient Medications on File Prior to Visit   Medication Sig Dispense Refill    clobetasol (TEMOVATE) 0.05 % cream Apply topically 2 (two) times a day 60 g 3    finasteride (PROSCAR) 5 mg tablet Take half a tablet (2.5mg) daily. 90 tablet 0    tirzepatide (Zepbound) 7.5 mg/0.5 mL auto-injector Inject 0.5 mL (7.5 mg total) under the skin once a week 2 mL 0    naproxen (Naprosyn) 500 mg tablet Take 1 tablet (500 mg total) by mouth 2 (two) times a day with meals for 21 days (Patient not taking: Reported on 9/19/2024) 42 tablet 0    Ruxolitinib Phosphate 1.5 % CREA Use to affected areas of scalp  "twice a day. (Patient not taking: Reported on 11/18/2024) 10 g 3     No current facility-administered medications on file prior to visit.      Social History     Tobacco Use    Smoking status: Never     Passive exposure: Never    Smokeless tobacco: Never   Vaping Use    Vaping status: Never Used   Substance and Sexual Activity    Alcohol use: Yes     Comment: socially    Drug use: No    Sexual activity: Yes     Partners: Male     Birth control/protection: None, Surgical       Objective   BP 94/64 (BP Location: Left arm, Patient Position: Sitting, Cuff Size: Adult)   Pulse 67   Temp 97.8 °F (36.6 °C) (Temporal)   Ht 5' 4\" (1.626 m)   Wt 65 kg (143 lb 3.2 oz)   SpO2 96%   BMI 24.58 kg/m²     Physical Exam  Vitals reviewed.   Constitutional:       General: She is not in acute distress.     Appearance: Normal appearance. She is well-developed.   HENT:      Head: Normocephalic and atraumatic.   Eyes:      Conjunctiva/sclera: Conjunctivae normal.   Cardiovascular:      Rate and Rhythm: Normal rate and regular rhythm.      Pulses: Normal pulses.      Heart sounds: Normal heart sounds. No murmur heard.  Pulmonary:      Effort: Pulmonary effort is normal. No respiratory distress.      Breath sounds: Normal breath sounds.   Abdominal:      Palpations: Abdomen is soft.      Tenderness: There is no abdominal tenderness.   Musculoskeletal:         General: No swelling.      Cervical back: Neck supple.   Skin:     General: Skin is warm and dry.      Capillary Refill: Capillary refill takes less than 2 seconds.   Neurological:      Mental Status: She is alert.   Psychiatric:         Mood and Affect: Mood normal.       Administrative Statements   I have spent a total time of 43 minutes in caring for this patient on the day of the visit/encounter including Diagnostic results, Prognosis, Risks and benefits of tx options, Instructions for management, Counseling / Coordination of care, Documenting in the medical record, Reviewing " / ordering tests, medicine, procedures  , and Obtaining or reviewing history  .

## 2024-11-19 DIAGNOSIS — Z12.31 ENCOUNTER FOR SCREENING MAMMOGRAM FOR BREAST CANCER: Primary | ICD-10-CM

## 2024-11-20 ENCOUNTER — CONSULT (OUTPATIENT)
Dept: GASTROENTEROLOGY | Facility: MEDICAL CENTER | Age: 44
End: 2024-11-20
Payer: COMMERCIAL

## 2024-11-20 ENCOUNTER — APPOINTMENT (OUTPATIENT)
Dept: LAB | Facility: HOSPITAL | Age: 44
End: 2024-11-20
Payer: COMMERCIAL

## 2024-11-20 VITALS
BODY MASS INDEX: 24.51 KG/M2 | SYSTOLIC BLOOD PRESSURE: 96 MMHG | WEIGHT: 142.8 LBS | HEART RATE: 65 BPM | TEMPERATURE: 96.7 F | DIASTOLIC BLOOD PRESSURE: 61 MMHG

## 2024-11-20 DIAGNOSIS — K59.09 CHRONIC CONSTIPATION: ICD-10-CM

## 2024-11-20 DIAGNOSIS — Z12.11 COLON CANCER SCREENING: ICD-10-CM

## 2024-11-20 DIAGNOSIS — K59.09 CHRONIC CONSTIPATION: Primary | ICD-10-CM

## 2024-11-20 DIAGNOSIS — R10.84 GENERALIZED ABDOMINAL PAIN: ICD-10-CM

## 2024-11-20 DIAGNOSIS — R19.6 BAD BREATH: ICD-10-CM

## 2024-11-20 LAB — TSH SERPL DL<=0.05 MIU/L-ACNC: 1.8 UIU/ML (ref 0.45–4.5)

## 2024-11-20 PROCEDURE — 84443 ASSAY THYROID STIM HORMONE: CPT

## 2024-11-20 PROCEDURE — 99244 OFF/OP CNSLTJ NEW/EST MOD 40: CPT | Performed by: INTERNAL MEDICINE

## 2024-11-20 PROCEDURE — 36415 COLL VENOUS BLD VENIPUNCTURE: CPT

## 2024-11-20 RX ORDER — BISACODYL 5 MG/1
10 TABLET, DELAYED RELEASE ORAL ONCE
Qty: 2 TABLET | Refills: 0 | Status: SHIPPED | OUTPATIENT
Start: 2024-11-20 | End: 2024-11-20

## 2024-11-20 RX ORDER — SODIUM CHLORIDE, SODIUM LACTATE, POTASSIUM CHLORIDE, CALCIUM CHLORIDE 600; 310; 30; 20 MG/100ML; MG/100ML; MG/100ML; MG/100ML
125 INJECTION, SOLUTION INTRAVENOUS CONTINUOUS
OUTPATIENT
Start: 2024-11-20

## 2024-11-20 RX ORDER — LINACLOTIDE 72 UG/1
72 CAPSULE, GELATIN COATED ORAL DAILY
Qty: 30 CAPSULE | Refills: 2 | Status: SHIPPED | OUTPATIENT
Start: 2024-11-20

## 2024-11-20 NOTE — PROGRESS NOTES
[facility] - Outpatient Consultation  Bisi Orozco 44 y.o. female MRN: [id number]  Encounter: [id number]          ASSESSMENT AND PLAN:      1. Generalized abdominal pain  Rule out H. Pylori for nonspecific abdominal pain    - H. pylori antigen, stool; Future    2. Chronic constipation (Primary)  Patient has symptoms of chronic constipation.   Trial of linzess again. Patient was counseled on the importance of water and fiber intake. She was also counseled on the correct use of Linzess  - TSH, 3rd generation; Future  - linaCLOtide (Linzess) 72 MCG CAPS; Take 72 mcg by mouth daily  Dispense: 30 capsule; Refill: 2  - polyethylene glycol (GOLYTELY) 4000 mL solution; Take 4,000 mL by mouth once for 1 dose  Dispense: 4000 mL; Refill: 0  - bisacodyl (DULCOLAX) 5 mg EC tablet; Take 2 tablets (10 mg total) by mouth once for 1 dose  Dispense: 2 tablet; Refill: 0    3. Bad breath  She denies odynophagia or dyspepsia,bad breath likely secondary to dysbiosis.  4. Colon cancer screening  Patient is due for colon cancer screening    - Colonoscopy; Future  Follow up after procedure.   ______________________________________________________________________    HPI: 44-year-old female presented for evaluation of bad breath, chronic constipation, colon cancer screening.  Patient reported she has been having constipation for many years.  She used to take Linzess but stopped taking it as she started having diarrhea.  Patient reported she takes over-the-counter laxatives every 3 days.  She does not move bowels every 3 to 4 days without taking any laxative.  She has sense of incomplete evacuation.  She pointed to [place] stool scale at #4 is the most frequent stool consistencies.  She never had a colonoscopy.  She denies blood in the stool.  She denies family history of colon cancer.      REVIEW OF SYSTEMS:    CONSTITUTIONAL: Denies any fever, chills, rigors, and weight loss.  HEENT: No earache or tinnitus.  Denies hearing loss or visual disturbances.  CARDIOVASCULAR: No chest pain or palpitations.   RESPIRATORY: Denies any cough, hemoptysis, shortness of breath or dyspnea on exertion.  GASTROINTESTINAL: As noted in the History of Present Illness.   GENITOURINARY: No problems with urination. Denies any hematuria or dysuria.  NEUROLOGIC: No dizziness or vertigo, denies headaches.   MUSCULOSKELETAL: Denies any muscle or joint pain.   SKIN: Denies skin rashes or itching.   ENDOCRINE: Denies excessive thirst. Denies intolerance to heat or cold.  PSYCHOSOCIAL: Denies depression or anxiety. Denies any recent memory loss.       Historical Information   History reviewed. No pertinent past medical history.  Past Surgical History:   Procedure Laterality Date    HYSTERECTOMY      For bleeding    TUBAL LIGATION      TUBAL LIGATION       Social History   Social History     Substance and Sexual Activity   Alcohol Use Yes    Comment: socially     Social History     Substance and Sexual Activity   Drug Use No     Social History     Tobacco Use   Smoking Status Never    Passive exposure: Never   Smokeless Tobacco Never     Family History   Problem Relation Age of Onset    Diabetes Father        Meds/Allergies       Current Outpatient Medications:     bisacodyl (DULCOLAX) 5 mg EC tablet    clobetasol (TEMOVATE) 0.05 % cream    docusate sodium (COLACE) 100 mg capsule    finasteride (PROSCAR) 5 mg tablet    linaCLOtide (Linzess) 72 MCG CAPS    polyethylene glycol (GOLYTELY) 4000 mL solution    tirzepatide (Zepbound) 7.5 mg/0.5 mL auto-injector    naproxen (Naprosyn) 500 mg tablet    Ruxolitinib Phosphate 1.5 % CREA    Allergies   Allergen Reactions    Morphine Hives           Objective     Blood pressure 96/61, pulse 65, temperature (!) 96.7 °F (35.9 °C), weight 64.8 kg (142 lb 12.8 oz). Body mass index is 24.51 kg/m².        PHYSICAL EXAM:      General Appearance:   Alert, cooperative, no distress   HEENT:   Normocephalic, atraumatic,  anicteric.     Neck:  Supple, symmetrical, trachea midline   Lungs:   Clear to auscultation bilaterally; no rales, rhonchi or wheezing; respirations unlabored    Heart::   Regular rate and rhythm; no murmur, rub, or gallop.   Abdomen:   Soft, non-tender, non-distended; normal bowel sounds; no masses, no organomegaly    Genitalia:   Deferred    Rectal:   Deferred    Extremities:  No cyanosis, clubbing or edema    Pulses:  2+ and symmetric    Skin:  No jaundice, rashes, or lesions    Lymph nodes:  No palpable cervical lymphadenopathy        Lab Results:   No visits with results within 1 Day(s) from this visit.   Latest known visit with results is:   Office Visit on 09/26/2024   Component Date Value    LEUKOCYTE ESTERASE,UA 09/26/2024 negative     NITRITE,UA 09/26/2024 negative     SL AMB POCT UROBILINOGEN 09/26/2024 0.2     POCT URINE PROTEIN 09/26/2024 negative      PH,UA 09/26/2024 5.0     BLOOD,UA 09/26/2024 negative     SPECIFIC GRAVITY,UA 09/26/2024 1.015     KETONES,UA 09/26/2024 negative     BILIRUBIN,UA 09/26/2024 negative     GLUCOSE, UA 09/26/2024 negative      COLOR,UA 09/26/2024 Light Yellow     CLARITY,UA 09/26/2024 clear     Trichomonas vaginalis 09/26/2024 Not Detected     Gardnerella vaginalis 09/26/2024 Not Detected     Candida Species 09/26/2024 Not Detected          Radiology Results:   No results found.

## 2024-11-21 ENCOUNTER — TELEPHONE (OUTPATIENT)
Dept: GASTROENTEROLOGY | Facility: MEDICAL CENTER | Age: 44
End: 2024-11-21

## 2024-11-21 ENCOUNTER — APPOINTMENT (OUTPATIENT)
Dept: LAB | Facility: HOSPITAL | Age: 44
End: 2024-11-21
Payer: COMMERCIAL

## 2024-11-21 DIAGNOSIS — R10.84 GENERALIZED ABDOMINAL PAIN: ICD-10-CM

## 2024-11-21 PROCEDURE — 87338 HPYLORI STOOL AG IA: CPT

## 2024-11-21 NOTE — TELEPHONE ENCOUNTER
Procedure: Colonoscopy  Date: 01/15  Physician performing: Dr. Stephenson  Location of procedure:  [place]  Instructions given to patient: [place]  Diabetic: N/A  Clearances: N/A

## 2024-11-22 ENCOUNTER — OFFICE VISIT (OUTPATIENT)
Dept: DERMATOLOGY | Facility: CLINIC | Age: 44
End: 2024-11-22
Payer: COMMERCIAL

## 2024-11-22 DIAGNOSIS — L66.12 FRONTAL FIBROSING ALOPECIA: Primary | ICD-10-CM

## 2024-11-22 DIAGNOSIS — L63.9 ALOPECIA AREATA: ICD-10-CM

## 2024-11-22 PROCEDURE — 11900 INJECT SKIN LESIONS </W 7: CPT

## 2024-11-22 RX ORDER — METFORMIN HCL 100 %
POWDER (GRAM) MISCELLANEOUS
Qty: 30 G | Refills: 2 | Status: SHIPPED | OUTPATIENT
Start: 2024-11-22

## 2024-11-22 NOTE — PROGRESS NOTES
"[facility] Note     Patient Name: Bisi Orozco  Encounter Date: 11/22/2024     Have you been cared for by a [facility] Dermatologist in the last 3 years and, if so, which description applies to you?    Yes.  I have been here within the last 3 years, and my medical history has NOT changed since that time.  I am FEMALE/of child-bearing potential.    REVIEW OF SYSTEMS:  Have you recently had or currently have any of the following? No changes in my recent health.   PAST MEDICAL HISTORY:  Have you personally ever had or currently have any of the following?  If \"YES,\" then please provide more detail. No changes in my medical history.   HISTORY OF IMMUNOSUPPRESSION: Do you have a history of any of the following:  Systemic Immunosuppression such as Diabetes, Biologic or Immunotherapy, Chemotherapy, Organ Transplantation, Bone Marrow Transplantation or Prednisone?  No     Answering \"YES\" requires the addition of the dotphrase \"IMMUNOSUPPRESSED\" as the first diagnosis of the patient's visit.   FAMILY HISTORY:  Any \"first degree relatives\" (parent, brother, sister, or child) with the following?    No changes in my family's known health.   PATIENT EXPERIENCE:    Do you want the Dermatologist to perform a COMPLETE skin exam today including a clinical examination under the \"bra and underwear\" areas?  NO  If necessary, do we have your permission to call and leave a detailed message on your Preferred Phone number that includes your specific medical information?  Yes      Allergies   Allergen Reactions    Morphine Hives      Current Outpatient Medications:     clobetasol (TEMOVATE) 0.05 % cream, Apply topically 2 (two) times a day, Disp: 60 g, Rfl: 3    docusate sodium (COLACE) 100 mg capsule, Take 1 capsule (100 mg total) by mouth 2 (two) times a day, Disp: 60 capsule, Rfl: 0    finasteride (PROSCAR) 5 mg tablet, Take half a tablet (2.5mg) daily., Disp: 90 tablet, Rfl: 0    linaCLOtide (Linzess) 72 MCG CAPS, " Take 72 mcg by mouth daily, Disp: 30 capsule, Rfl: 2    psyllium (METAMUCIL) 58.6 % powder, Take 1 packet by mouth 3 (three) times a day, Disp: 660 g, Rfl: 2    tirzepatide (Zepbound) 7.5 mg/0.5 mL auto-injector, Inject 0.5 mL (7.5 mg total) under the skin once a week, Disp: 2 mL, Rfl: 0    bisacodyl (DULCOLAX) 5 mg EC tablet, Take 2 tablets (10 mg total) by mouth once for 1 dose, Disp: 2 tablet, Rfl: 0    naproxen (Naprosyn) 500 mg tablet, Take 1 tablet (500 mg total) by mouth 2 (two) times a day with meals for 21 days (Patient not taking: Reported on 9/19/2024), Disp: 42 tablet, Rfl: 0    polyethylene glycol (GOLYTELY) 4000 mL solution, Take 4,000 mL by mouth once for 1 dose, Disp: 4000 mL, Rfl: 0    Ruxolitinib Phosphate 1.5 % CREA, Use to affected areas of scalp twice a day. (Patient not taking: Reported on 11/18/2024), Disp: 10 g, Rfl: 3        Whom besides the patient is providing clinical information about today's encounter?   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 She is currently taking finasteride 2.5mg daily and applying topical ruxolitinib phosphate 1.5% cream. Patient reports less hair loss and shedding, but notes there is a new patch of loss since last visit. Patient is also currently taking Zepbound for weight loss.                       Assessment and Plan:     Start topical metformin 10% cream- Apply to affected areas once a day.  [facility] will contact patient for shipment.  Discontinue topical ruxolitinib phosphate 1.5% cream.   Will increase oral finasteride to 5 mg daily. Discussed associated side effects of reduced libido and mood changes. Patient denies a personal or family history of breast cancer. Patient reports a history of hysterectomy therefore no concern for pregnancy while on this medication.   Discussed side effect of tirzepatide (Zepbound) is hair loss and shedding due to sudden weight loss, so while on it, may take a few months from stabilization of weight to notice reduced shedding.   Intralesional Kenalog injection performed today today in patch of alopecia areata. Written consent obtained.   Follow up 3 months.     PROCEDURE:  INTRALESIONAL STEROID INJECTION (KENALOG INJECTION)    Purpose: Triamcinolone is a synthetic glucocorticoid corticosteroid that has marked anti-inflammatory action. It is prepared in sterile aqueous suspension suitable for injecting directly into a lesion on or immediately below the skin to treat a dermal inflammatory process.     Indications: It is indicated for alopecia areata; inflammatory acne cysts; discoid lupus erythematosus; keloids and hypertrophic scars; inflammatory lesions of granuloma annulare, lichen planus, lichen simplex chronicus (neurodermatitis), psoriatic plaques, and other localized inflammatory skin conditions.     Potential Side Effects: I understand that triamcinolone injection can potentially cause early and/or delayed adverse effects such as:    Pain    Impaired wound healing    Increased hair growth    Bleeding    White or brown marks    Steroid acne    Infection    Telangiectasia    Skin thinning    Cutaneous and subcutaneous lipoatrophy (most common) appearing as skin indentations or dimples around the injection sites a few weeks after treatment     PROCEDURE NOTE:  After verbal and written consent were obtained, the to-be-treated area was wiped and cleaned  with rubbing alcohol 70%.      Then, a total of 2 mL of Kenalog CONCENTRATION:  2.5 mg/mL, 10 mg/ML diluted down to 2.5mg/mL with lidocaine 1% with epinephrine   (Lot# [id number]; Expiration MAR 2026, NDC#: 4742-8139-90) was injected intralesionally into a total of 1 lesion/s on the following anatomic areas:  left occipital scalp using a 3-mL syringe and a 30-gauge needle.      There was less than 1 mL of blood loss and little to no discomfort.  The area was bandaged with a Band-aid.  The patient tolerated the procedure well and remained in the office for observation.  With no signs of an adverse reaction, the patient was eventually discharged from clinic.        Scribe Attestation      I,:  Verito Tai MA am acting as a scribe while in the presence of the attending physician.:       I,:  Aria Greenberg PA-C personally performed the services described in this documentation    as scribed in my presence.:

## 2024-11-23 LAB — H PYLORI AG STL QL IA: NEGATIVE

## 2024-11-27 ENCOUNTER — RESULTS FOLLOW-UP (OUTPATIENT)
Dept: GASTROENTEROLOGY | Facility: MEDICAL CENTER | Age: 44
End: 2024-11-27

## 2024-12-03 DIAGNOSIS — L66.12 FRONTAL FIBROSING ALOPECIA: ICD-10-CM

## 2024-12-03 DIAGNOSIS — L63.9 ALOPECIA AREATA: Primary | ICD-10-CM

## 2024-12-03 RX ORDER — FINASTERIDE 5 MG/1
TABLET, FILM COATED ORAL
Qty: 90 TABLET | Refills: 0 | Status: SHIPPED | OUTPATIENT
Start: 2024-12-03

## 2024-12-17 ENCOUNTER — TELEPHONE (OUTPATIENT)
Dept: GASTROENTEROLOGY | Facility: MEDICAL CENTER | Age: 44
End: 2024-12-17

## 2024-12-18 ENCOUNTER — ANESTHESIA EVENT (OUTPATIENT)
Dept: ANESTHESIOLOGY | Facility: HOSPITAL | Age: 44
End: 2024-12-18

## 2024-12-18 ENCOUNTER — ANESTHESIA (OUTPATIENT)
Dept: ANESTHESIOLOGY | Facility: HOSPITAL | Age: 44
End: 2024-12-18

## 2024-12-18 PROBLEM — Z12.12 SCREENING FOR COLORECTAL CANCER: Status: RESOLVED | Noted: 2024-11-18 | Resolved: 2024-12-18

## 2024-12-18 PROBLEM — Z13.6 SCREENING FOR CARDIOVASCULAR CONDITION: Status: RESOLVED | Noted: 2024-11-18 | Resolved: 2024-12-18

## 2024-12-18 PROBLEM — Z12.11 SCREENING FOR COLORECTAL CANCER: Status: RESOLVED | Noted: 2024-11-18 | Resolved: 2024-12-18

## 2025-01-02 ENCOUNTER — TELEPHONE (OUTPATIENT)
Dept: GASTROENTEROLOGY | Facility: MEDICAL CENTER | Age: 45
End: 2025-01-02

## 2025-01-02 NOTE — TELEPHONE ENCOUNTER
Called and spoke to patient to confirm procedure for 01/15 with Dr. Stephenson, pt has confirmed and asked to send instructions via [employer] for pt to review

## 2025-01-06 DIAGNOSIS — E66.811 OBESITY, CLASS I, BMI 30-34.9: ICD-10-CM

## 2025-01-06 NOTE — TELEPHONE ENCOUNTER
Reason for call:   [x] Refill   [] Prior Auth  [] Other:     Office:   [] PCP/Provider -   [x] Specialty/Provider - [facility]     Medication: tirzepatide 7.5 mg, inject 0.5 mL under the skin once a week.      Pharmacy: [facility] [address] [place]     Does the patient have enough for 3 days?   [] Yes   [x] No - Send as HP to POD         How are you tolerating the medication?   [] Nausea  [] Vomiting  [] Diarrhea  [x] Asymptomatic  [] Other:    Last visit weight: 140 lbs     Current weight: 140 lbs     Date of last injection: Patient states she is very late, probably about 3 weeks ago     How many injections do you have left: 0

## 2025-01-07 RX ORDER — TIRZEPATIDE 7.5 MG/.5ML
7.5 INJECTION, SOLUTION SUBCUTANEOUS WEEKLY
Qty: 2 ML | Refills: 0 | Status: SHIPPED | OUTPATIENT
Start: 2025-01-07 | End: 2025-03-04

## 2025-01-14 DIAGNOSIS — Z12.11 SCREENING FOR COLORECTAL CANCER: Primary | ICD-10-CM

## 2025-01-14 DIAGNOSIS — Z12.12 SCREENING FOR COLORECTAL CANCER: Primary | ICD-10-CM

## 2025-01-15 ENCOUNTER — HOSPITAL ENCOUNTER (OUTPATIENT)
Dept: GASTROENTEROLOGY | Facility: MEDICAL CENTER | Age: 45
Setting detail: OUTPATIENT SURGERY
Discharge: HOME | End: 2025-01-15
Attending: INTERNAL MEDICINE
Payer: COMMERCIAL

## 2025-01-15 ENCOUNTER — ANESTHESIA (OUTPATIENT)
Dept: GASTROENTEROLOGY | Facility: MEDICAL CENTER | Age: 45
End: 2025-01-15
Payer: COMMERCIAL

## 2025-01-15 ENCOUNTER — ANESTHESIA EVENT (OUTPATIENT)
Dept: GASTROENTEROLOGY | Facility: MEDICAL CENTER | Age: 45
End: 2025-01-15
Payer: COMMERCIAL

## 2025-01-15 VITALS
DIASTOLIC BLOOD PRESSURE: 60 MMHG | WEIGHT: 142 LBS | SYSTOLIC BLOOD PRESSURE: 91 MMHG | RESPIRATION RATE: 18 BRPM | HEART RATE: 77 BPM | HEIGHT: 64 IN | OXYGEN SATURATION: 100 % | BODY MASS INDEX: 24.24 KG/M2 | TEMPERATURE: 98.3 F

## 2025-01-15 DIAGNOSIS — Z12.11 COLON CANCER SCREENING: ICD-10-CM

## 2025-01-15 DIAGNOSIS — K59.00 CONSTIPATION, UNSPECIFIED CONSTIPATION TYPE: Primary | ICD-10-CM

## 2025-01-15 PROCEDURE — 45380 COLONOSCOPY AND BIOPSY: CPT | Performed by: INTERNAL MEDICINE

## 2025-01-15 PROCEDURE — 88305 TISSUE EXAM BY PATHOLOGIST: CPT | Performed by: PATHOLOGY

## 2025-01-15 RX ORDER — PROPOFOL 10 MG/ML
INJECTION, EMULSION INTRAVENOUS AS NEEDED
Status: DISCONTINUED | OUTPATIENT
Start: 2025-01-15 | End: 2025-01-15

## 2025-01-15 RX ORDER — SODIUM CHLORIDE, SODIUM LACTATE, POTASSIUM CHLORIDE, CALCIUM CHLORIDE 600; 310; 30; 20 MG/100ML; MG/100ML; MG/100ML; MG/100ML
50 INJECTION, SOLUTION INTRAVENOUS CONTINUOUS
Status: CANCELLED | OUTPATIENT
Start: 2025-01-15

## 2025-01-15 RX ORDER — LINACLOTIDE 145 UG/1
145 CAPSULE, GELATIN COATED ORAL DAILY
Qty: 30 CAPSULE | Refills: 5 | Status: SHIPPED | OUTPATIENT
Start: 2025-01-15

## 2025-01-15 RX ORDER — SODIUM CHLORIDE, SODIUM LACTATE, POTASSIUM CHLORIDE, CALCIUM CHLORIDE 600; 310; 30; 20 MG/100ML; MG/100ML; MG/100ML; MG/100ML
125 INJECTION, SOLUTION INTRAVENOUS CONTINUOUS
Status: DISCONTINUED | OUTPATIENT
Start: 2025-01-15 | End: 2025-01-19 | Stop reason: HOSPADM

## 2025-01-15 RX ORDER — PROPOFOL 10 MG/ML
INJECTION, EMULSION INTRAVENOUS CONTINUOUS PRN
Status: DISCONTINUED | OUTPATIENT
Start: 2025-01-15 | End: 2025-01-15

## 2025-01-15 RX ORDER — SODIUM CHLORIDE, SODIUM LACTATE, POTASSIUM CHLORIDE, CALCIUM CHLORIDE 600; 310; 30; 20 MG/100ML; MG/100ML; MG/100ML; MG/100ML
50 INJECTION, SOLUTION INTRAVENOUS CONTINUOUS
Status: DISCONTINUED | OUTPATIENT
Start: 2025-01-15 | End: 2025-01-19 | Stop reason: HOSPADM

## 2025-01-15 RX ADMIN — PROPOFOL 140 MCG/KG/MIN: 10 INJECTION, EMULSION INTRAVENOUS at 13:29

## 2025-01-15 RX ADMIN — SODIUM CHLORIDE, SODIUM LACTATE, POTASSIUM CHLORIDE, AND CALCIUM CHLORIDE: .6; .31; .03; .02 INJECTION, SOLUTION INTRAVENOUS at 13:15

## 2025-01-15 RX ADMIN — Medication 40 MG: at 13:34

## 2025-01-15 RX ADMIN — PROPOFOL 40 MG: 10 INJECTION, EMULSION INTRAVENOUS at 13:28

## 2025-01-15 RX ADMIN — PROPOFOL 120 MG: 10 INJECTION, EMULSION INTRAVENOUS at 13:27

## 2025-01-15 NOTE — ANESTHESIA PREPROCEDURE EVALUATION
Procedure:  COLONOSCOPY    Relevant Problems   No relevant active problems        Physical Exam    Airway    Mallampati score: III  TM Distance: >3 FB  Neck ROM: full     Dental   No notable dental hx     Cardiovascular  Cardiovascular exam normal    Pulmonary  Pulmonary exam normal     Other Findings  post-pubertal.      Anesthesia Plan  ASA Score- 2     Anesthesia Type- IV sedation with anesthesia with ASA Monitors.         Additional Monitors:     Airway Plan:            Plan Factors-Exercise tolerance (METS): >4 METS.    Chart reviewed.    Patient summary reviewed.    Patient is not a current smoker.              Induction- intravenous.    Postoperative Plan-         Informed Consent- Anesthetic plan and risks discussed with patient.        NPO Status:  Vitals Value Taken Time   Date of last liquid 01/15/25 01/15/25 1312   Time of last liquid 0330 01/15/25 1312   Date of last solid 01/13/25 01/15/25 1312   Time of last solid 1600 01/15/25 1312

## 2025-01-15 NOTE — H&P
History and Physical - [facility]  Bisi Orozco 44 y.o. female MRN: [id number]                  HPI: Bisi Orozco is a 44 y.o. year old female who presents for colon cancer screening      REVIEW OF SYSTEMS: Per the HPI, and otherwise unremarkable.    Historical Information   No past medical history on file.  Past Surgical History:   Procedure Laterality Date    HYSTERECTOMY      For bleeding    TUBAL LIGATION      TUBAL LIGATION       Social History   Social History     Substance and Sexual Activity   Alcohol Use Yes    Comment: socially     Social History     Substance and Sexual Activity   Drug Use No     Social History     Tobacco Use   Smoking Status Never    Passive exposure: Never   Smokeless Tobacco Never     Family History   Problem Relation Age of Onset    Diabetes Father        Meds/Allergies       Current Outpatient Medications:     bisacodyl (DULCOLAX) 5 mg EC tablet    clobetasol (TEMOVATE) 0.05 % cream    docusate sodium (COLACE) 100 mg capsule    finasteride (PROSCAR) 5 mg tablet    linaCLOtide (Linzess) 72 MCG CAPS    Metformin HCl POWD    naproxen (Naprosyn) 500 mg tablet    polyethylene glycol (GOLYTELY) 4000 mL solution    psyllium (METAMUCIL) 58.6 % powder    Ruxolitinib Phosphate 1.5 % CREA    tirzepatide (Zepbound) 7.5 mg/0.5 mL auto-injector    Allergies   Allergen Reactions    Morphine Hives       Objective     There were no vitals taken for this visit.      PHYSICAL EXAM    Gen: NAD  Head: NCAT  CV: RRR  CHEST: Clear  ABD: soft, NT/ND  EXT: no edema      ASSESSMENT/PLAN:  This is a 44 y.o. year old female here for colon cancer screening, and she is stable and optimized for her procedure.

## 2025-01-15 NOTE — ANESTHESIA POSTPROCEDURE EVALUATION
Post-Op Assessment Note    CV Status:  Stable  Pain Score: 0    Pain management: adequate       Mental Status:  Alert and awake   Hydration Status:  Euvolemic   PONV Controlled:  Controlled   Airway Patency:  Patent     Post Op Vitals Reviewed: Yes    No anethesia notable event occurred.    Staff: Anesthesiologist, CRNA           Last Filed PACU Vitals:  Vitals Value Taken Time   Temp     Pulse     BP     Resp     SpO2         Modified Hannah:     Vitals Value Taken Time   Activity 2 01/15/25 1352   Respiration 2 01/15/25 1352   Circulation 2 01/15/25 1352   Consciousness 2 01/15/25 1352   Oxygen Saturation 2 01/15/25 1352     Modified Hannah Score: 10

## 2025-01-16 ENCOUNTER — TELEPHONE (OUTPATIENT)
Dept: GASTROENTEROLOGY | Facility: MEDICAL CENTER | Age: 45
End: 2025-01-16

## 2025-01-16 PROCEDURE — 88305 TISSUE EXAM BY PATHOLOGIST: CPT | Performed by: PATHOLOGY

## 2025-01-16 NOTE — TELEPHONE ENCOUNTER
calling for pathology results of biopsies taken during yesterday's procedure. She states she read the results in [employer] and is requesting review. Informed pt what she viewed is the case report and information documenting the type of tissue(s), location where specimens were collected, and quantity of biopsies taken. Official pathology results may take up to 5 days. Office will return call once results are available. She is thankful and without further questions.

## 2025-01-20 ENCOUNTER — TELEPHONE (OUTPATIENT)
Dept: FAMILY MEDICINE CLINIC | Facility: CLINIC | Age: 45
End: 2025-01-20

## 2025-01-20 ENCOUNTER — RESULTS FOLLOW-UP (OUTPATIENT)
Dept: GASTROENTEROLOGY | Facility: MEDICAL CENTER | Age: 45
End: 2025-01-20

## 2025-01-20 ENCOUNTER — TELEPHONE (OUTPATIENT)
Age: 45
End: 2025-01-20

## 2025-01-21 ENCOUNTER — TELEPHONE (OUTPATIENT)
Age: 45
End: 2025-01-21

## 2025-01-21 NOTE — TELEPHONE ENCOUNTER
The patient called the office regarding her Cologuard order. As per pt she never received the kit in the mail and would need need the provider to cancel the order and place a new one in. Patient would like a call back after that has been done so she can schedule to have the kit delivered again. Please review and advise.

## 2025-01-27 ENCOUNTER — OFFICE VISIT (OUTPATIENT)
Age: 45
End: 2025-01-27
Payer: COMMERCIAL

## 2025-01-27 VITALS — HEIGHT: 64 IN | BODY MASS INDEX: 24.24 KG/M2 | WEIGHT: 142 LBS

## 2025-01-27 DIAGNOSIS — M54.6 CHRONIC RIGHT-SIDED THORACIC BACK PAIN: ICD-10-CM

## 2025-01-27 DIAGNOSIS — M79.18 MYOFASCIAL PAIN: ICD-10-CM

## 2025-01-27 DIAGNOSIS — M54.12 CERVICAL RADICULAR PAIN: ICD-10-CM

## 2025-01-27 DIAGNOSIS — M54.2 NECK PAIN: Primary | ICD-10-CM

## 2025-01-27 DIAGNOSIS — G89.29 CHRONIC RIGHT-SIDED THORACIC BACK PAIN: ICD-10-CM

## 2025-01-27 PROCEDURE — 99203 OFFICE O/P NEW LOW 30 MIN: CPT | Performed by: CHIROPRACTOR

## 2025-01-27 PROCEDURE — 98940 CHIROPRACT MANJ 1-2 REGIONS: CPT | Performed by: CHIROPRACTOR

## 2025-01-27 RX ORDER — TOPIRAMATE 25 MG/1
TABLET, FILM COATED ORAL
COMMUNITY

## 2025-01-27 RX ORDER — PHENTERMINE HYDROCHLORIDE 37.5 MG/1
CAPSULE ORAL
COMMUNITY

## 2025-01-27 NOTE — PROGRESS NOTES
Assessment/Plan:    1. Neck pain        2. Myofascial pain        3. Cervical radicular pain        4. Chronic right-sided thoracic back pain            Review of Diagnostic Studies and Office Notes:    No results found for this or any previous visit.      Decision and Treatment Plan:    I reviewed my findings with the patient today.  Patient was interested in receiving chiropractic care and was treated today.  We will treat the patient 2x/week for the next three weeks and re-evaluate. If there is improvement in symptoms and objective findings, frequency will be reduced.  She was not sure if she can come in twice per week so we will attempt to come in at least once per week.    The patient will be treated with conservative chiropractic care including myofascial release, joint mobilization, and a home stretching and icing program.    The patient was taught a stretch today. The patient was advised to do the stretch for 3 sets of 15-20 seconds several times per day.The need to stretch to the point of tension only was discussed.     Patient Instructions:    Return for treatment later this week or 1-2 times next week.    Time/Reviewed with Patient:    Time:  At least 33 minutes of time was spent with the patient during the consultation including the patient's history/current complaints, physical exam, reviewing the diagnostic report, reviewing home instruction/reducing risk factors with the patient, reviewing my findings with the patient, and discussing the treatment with the patient.     This is a separate identifiable portion of today's visit from the E and M code billed.    Treatment today consisted of  myofascial release to the cervical paraspinals, right upper trapezius, rhomboids, levator scapula, thoracic paraspinals.     Manipulation was performed to the thoracic spine at T3-4, T5-6 utilizing a gentle double thenar contact.  Manual traction was applied to the cervical spine intermittently.    Review of Systems  "  Constitutional:  Negative for chills, fever and unexpected weight change.   Gastrointestinal:  Negative for constipation and diarrhea.   Genitourinary:  Negative for difficulty urinating and urgency.     Subjective:    Bisi Orozco is a 44 y.o. female evaluation and possible treatment.  She has a chief complaint of right-sided neck pain with pain down into the mid to upper thoracic region and posterior shoulder.  She reports symptoms have been present for approximately 5 months.  She does not recall a specific injury or incident of onset.  She does relate pulling things at work seem to aggravate the shoulder and neck pain.  She denies pain radiating down the arm with the exception of pain in the tricep region.  She does have numbness and tingling in the right hand.  However she states she has been told she has carpal tunnel syndrome on the right.  She states she had an EMG done approximately 2 years ago by Dr. Barrett.  She did see Dr. Barrett at the end of August 2024.  She was sent to physical therapy for the shoulder.  She states she went for 1 visit and felt a little bit better but never went back.  She did not have any diagnostic imaging of the cervical spine or shoulder.  She states her friend told her to try chiropractic care so that is why she is here today.  She does report that she grinds her teeth and wears a nightguard which has helped with jaw pain. Prior to using  she had significant jaw pain in the mornings.  She still does get occasional headaches/pain in the right temporal region but the nightguard has helped.      She denies fever, chills, night sweats, recent unexplained weight loss, changes in bowel/bladder habits, urinary incontinence or retention.       Objective:     Ht 5' 4\" (1.626 m)   Wt 64.4 kg (142 lb)   BMI 24.37 kg/m²     Appearance: Well developed, well nourished, and in no acute distress    Alert and oriented x 3    Mood/Affect: calm and " pleasant    Gait/Posture:    Gait: Normal    Antalgic posture: None    Lordosis: Cervical- decreased    Lordosis: Lumbar- normal    Kyphosis: Thoracic- normal    Upper extremity reflexes:    Deep tendon reflexes were normal and symmetrical in the upper extremities.    Upper Extremity Muscle Testing:    Muscle testing of the bilateral upper extremities was normal and graded +5/5.    Coronado's was negative bilaterally.    Cervical Orthopedic Tests:    Maximal foraminal Compression on the Right: negative .    Maximal foraminal Compression on the Left: negative .    Phalen's test: negative on right    Reverse Phalen's test:negative on right    Tinel's was negative on the right.        Active Cervical Ranges of Motion:    Cervical range of motion examination shows there is full flexion with mild pulling at the end range.  Extension is full and pain-free.    Left rotation causes some pulling on the right side.  Right rotation is not restricted but mildly painful at the end range.  Left lateral bending causes pulling on the right side.  Right lateral bending is not painful.    Right shoulder range of motion is full pain-free at the glenohumeral joint.    Palpation:    Moderate spasm and tenderness is noted in the right upper trapezius, right cervical and upper thoracic paraspinals, middle trapezius and rhomboids on the right.  Multiple trigger points are present in the right upper trapezius, cervical paraspinals, right middle trapezius and rhomboids, infraspinatus.  Inferior trapezius trigger points are also noted.  Trigger points are also noted in the right temporalis.     Joint dysfunction is present at T3-4, T5-6, T6-7, C6-7.    Dictation voice to text software has been used in the creation of this document. Please consider this in light of any contextual or grammatical errors.

## 2025-01-30 ENCOUNTER — HOSPITAL ENCOUNTER (OUTPATIENT)
Dept: MAMMOGRAPHY | Facility: MEDICAL CENTER | Age: 45
Discharge: HOME | End: 2025-01-30
Payer: COMMERCIAL

## 2025-01-30 VITALS — WEIGHT: 142 LBS | BODY MASS INDEX: 24.24 KG/M2 | HEIGHT: 64 IN

## 2025-01-30 DIAGNOSIS — Z12.31 ENCOUNTER FOR SCREENING MAMMOGRAM FOR BREAST CANCER: ICD-10-CM

## 2025-01-30 PROCEDURE — 77067 SCR MAMMO BI INCL CAD: CPT

## 2025-01-30 PROCEDURE — 77063 BREAST TOMOSYNTHESIS BI: CPT

## 2025-02-03 DIAGNOSIS — E66.811 OBESITY, CLASS I, BMI 30-34.9: ICD-10-CM

## 2025-02-03 NOTE — TELEPHONE ENCOUNTER
Reason for call:   [x] Refill   [] Prior Auth  [] Other:     Office:   [] PCP/Provider -   [x] Specialty/Provider - [facility]/ Marc Lau PA-C       Medication:     tirzepatide (Zepbound) 7.5 mg/0.5 mL auto-injector       Dose/Frequency:     Inject 0.5 mL (7.5 mg total) under the skin once a week       Quantity: 2 ML    Pharmacy: [id number]  [place] [address] [phone]     Does the patient have enough for 3 days?   [] Yes   [x] No - Send as HP to POD

## 2025-02-04 RX ORDER — TIRZEPATIDE 7.5 MG/.5ML
7.5 INJECTION, SOLUTION SUBCUTANEOUS WEEKLY
Qty: 2 ML | Refills: 0 | Status: SHIPPED | OUTPATIENT
Start: 2025-02-04 | End: 2025-02-11

## 2025-02-05 ENCOUNTER — PROCEDURE VISIT (OUTPATIENT)
Age: 45
End: 2025-02-05
Payer: COMMERCIAL

## 2025-02-05 VITALS — WEIGHT: 142 LBS | HEIGHT: 64 IN | BODY MASS INDEX: 24.24 KG/M2

## 2025-02-05 DIAGNOSIS — M79.18 MYOFASCIAL PAIN: ICD-10-CM

## 2025-02-05 DIAGNOSIS — G89.29 CHRONIC RIGHT-SIDED THORACIC BACK PAIN: ICD-10-CM

## 2025-02-05 DIAGNOSIS — M54.2 NECK PAIN: Primary | ICD-10-CM

## 2025-02-05 DIAGNOSIS — M54.6 CHRONIC RIGHT-SIDED THORACIC BACK PAIN: ICD-10-CM

## 2025-02-05 DIAGNOSIS — M54.12 CERVICAL RADICULAR PAIN: ICD-10-CM

## 2025-02-05 PROCEDURE — 98941 CHIROPRACT MANJ 3-4 REGIONS: CPT | Performed by: CHIROPRACTOR

## 2025-02-05 PROCEDURE — 97110 THERAPEUTIC EXERCISES: CPT | Performed by: CHIROPRACTOR

## 2025-02-05 NOTE — PROGRESS NOTES
Assessment:     1. Neck pain        2. Myofascial pain        3. Cervical radicular pain        4. Chronic right-sided thoracic back pain            Condition is the same.    PLAN/TREATMENT:    Return for treatment 1-2 times next week.   Continue using ice as needed for posttreatment soreness.  Continue stretching.  Treatment:  Myofascial release was performed to the cervical paraspinals, levator scapula, rhomboids, right upper trapezius.  Myofascial release was performed to the right temporalis.  We performed Graston Technique to the right upper trapezius, levator scapula rhomboids with the patient bending her head as well as moving the shoulder to increase range of motion and flexibility for a total of 10 minutes.    Manipulation was performed to the thoracic spine at T3-4 and T5-6 utilizing a gentle double thenar contact.  Manipulation was performed to cervical spine utilizing stairstepping technique.  No HVLA was performed.  Manual traction was applied to the cervical spine intermittently.    Subjective:  Bisi is here today with ongoing complaints of neck and upper back pain and is mostly right-sided as well as pain into the right posterior shoulder.  She reports no changes since her last visit.    Objective:  Moderate spasm and tenderness is noted in the right upper trapezius, right cervical and upper thoracic paraspinals, middle trapezius and rhomboids on the right.  Multiple trigger points are present in the right upper trapezius, cervical paraspinals, right middle trapezius and rhomboids, infraspinatus.  Inferior trapezius trigger points are also noted.  Trigger points are also noted in the right temporalis.     Joint dysfunction is present at T3-4, T5-6, T6-7, C6-7.

## 2025-02-11 ENCOUNTER — OFFICE VISIT (OUTPATIENT)
Dept: BARIATRICS | Facility: CLINIC | Age: 45
End: 2025-02-11
Payer: COMMERCIAL

## 2025-02-11 VITALS
WEIGHT: 142.8 LBS | TEMPERATURE: 96.9 F | SYSTOLIC BLOOD PRESSURE: 116 MMHG | DIASTOLIC BLOOD PRESSURE: 73 MMHG | RESPIRATION RATE: 16 BRPM | BODY MASS INDEX: 24.38 KG/M2 | HEART RATE: 70 BPM | HEIGHT: 64 IN

## 2025-02-11 DIAGNOSIS — Z86.39 HISTORY OF OBESITY: Primary | ICD-10-CM

## 2025-02-11 DIAGNOSIS — R73.03 PREDIABETES: ICD-10-CM

## 2025-02-11 PROCEDURE — 99214 OFFICE O/P EST MOD 30 MIN: CPT | Performed by: PHYSICIAN ASSISTANT

## 2025-02-11 RX ORDER — TIRZEPATIDE 5 MG/.5ML
5 INJECTION, SOLUTION SUBCUTANEOUS WEEKLY
Qty: 2 ML | Refills: 6 | Status: SHIPPED | OUTPATIENT
Start: 2025-02-11 | End: 2025-08-26

## 2025-02-11 NOTE — ASSESSMENT & PLAN NOTE
-Patient is pursuing Conservative Program  -Initial weight loss goal of 5-10% weight loss for improved health  -Screening labs 4/27/24 cbc, cmp, A1c.  To recheck A1C this year.  Should have improved with weight loss    Onset:  1-2 years ago    Highest weight: 182    Initial weight:171.2lb  Previous weight: 150  Current weight:  142.8  Change in weight: -28.4  Goal: 140-MET, now to maintain    On zepbound 7.5mg.  start weight 171.2lb. 17 % weight loss. To reduce to 5mg .  If still losing discussed possible contrave generics.

## 2025-02-11 NOTE — PROGRESS NOTES
Assessment/Plan:    History of obesity  -Patient is pursuing Conservative Program  -Initial weight loss goal of 5-10% weight loss for improved health  -Screening labs 4/27/24 cbc, cmp, A1c.  To recheck A1C this year.  Should have improved with weight loss    Onset:  1-2 years ago    Highest weight: 182    Initial weight:171.2lb  Previous weight: 150  Current weight:  142.8  Change in weight: -28.4  Goal: 140-MET, now to maintain    On zepbound 7.5mg.  start weight 171.2lb. 17 % weight loss. To reduce to 5mg .  If still losing discussed possible contrave generics.         Return in about 6 months (around 8/11/2025).       Diagnoses and all orders for this visit:    History of obesity  -     Comprehensive metabolic panel; Future  -     CBC and differential; Future  -     tirzepatide (Zepbound) 5 mg/0.5 mL auto-injector; Inject 0.5 mL (5 mg total) under the skin once a week    Prediabetes  -     Comprehensive metabolic panel; Future  -     CBC and differential; Future  -     tirzepatide (Zepbound) 5 mg/0.5 mL auto-injector; Inject 0.5 mL (5 mg total) under the skin once a week          Subjective:   Chief Complaint   Patient presents with    Follow-up     MWM-6M F/u; Waist-30.5in        Patient ID: Bisi Orozco  is a 44 y.o. female with excess weight/obesity here to pursue weight managment.  Patient is pursuing Conservative Program.     HPI  On zepbound 7.5mg.  She is happy with weight loss     She is an emotional eater.  Used to eat more rice but now is more limited .  The medication has helped limit this  Wt Readings from Last 10 Encounters:   02/11/25 64.8 kg (142 lb 12.8 oz)   02/05/25 64.4 kg (142 lb)   01/30/25 64.4 kg (142 lb)   01/27/25 64.4 kg (142 lb)   01/15/25 64.4 kg (142 lb)   11/20/24 64.8 kg (142 lb 12.8 oz)   11/18/24 65 kg (143 lb 3.2 oz)   09/26/24 65.6 kg (144 lb 9.6 oz)   09/19/24 65.6 kg (144 lb 9.6 oz)   08/29/24 66.2 kg (146 lb)       Food logging:  Increased  appetite/cravings:  Exercise:walking and lifting at work   Hydration:over 64 oz water    Diet Recall:   B: oatmeal, fruits  L: salad and grilled chicken breast  D: protein, vegetable      The following portions of the patient's history were reviewed and updated as appropriate: She  has no past medical history on file.  She   Patient Active Problem List    Diagnosis Date Noted    Annual physical exam 11/18/2024    Encounter for screening mammogram for breast cancer 11/18/2024    Constipation 11/18/2024    Carpal tunnel syndrome on right 05/09/2024    History of obesity 03/26/2024    Prediabetes 11/10/2023    Gastritis 10/02/2018    H. pylori infection 10/02/2018    Alopecia 10/02/2018     She  has a past surgical history that includes Tubal ligation; Tubal ligation; Hysterectomy (2009); and Augmentation mammaplasty (Bilateral, 2009).  Her family history includes Diabetes in her father.  She  reports that she has never smoked. She has never been exposed to tobacco smoke. She has never used smokeless tobacco. She reports current alcohol use. She reports that she does not use drugs.  Current Outpatient Medications   Medication Sig Dispense Refill    linaCLOtide (Linzess) 145 MCG CAPS Take 1 capsule (145 mcg total) by mouth daily 30 capsule 5    psyllium (METAMUCIL) 58.6 % powder Take 1 packet by mouth 3 (three) times a day 660 g 2    tirzepatide (Zepbound) 5 mg/0.5 mL auto-injector Inject 0.5 mL (5 mg total) under the skin once a week 2 mL 6    finasteride (PROSCAR) 5 mg tablet Take 1 pill daily. (Patient not taking: Reported on 2/11/2025) 90 tablet 0    Metformin HCl POWD Apply to affected areas of scalp once daily. 30 g 2     No current facility-administered medications for this visit.     Current Outpatient Medications on File Prior to Visit   Medication Sig    linaCLOtide (Linzess) 145 MCG CAPS Take 1 capsule (145 mcg total) by mouth daily    psyllium (METAMUCIL) 58.6 % powder Take 1 packet by mouth 3 (three) times  "a day    [DISCONTINUED] tirzepatide (Zepbound) 7.5 mg/0.5 mL auto-injector Inject 0.5 mL (7.5 mg total) under the skin once a week    finasteride (PROSCAR) 5 mg tablet Take 1 pill daily. (Patient not taking: Reported on 2/11/2025)    Metformin HCl POWD Apply to affected areas of scalp once daily.    [DISCONTINUED] Biotin 05436 MCG TBDP Take by mouth    [DISCONTINUED] clobetasol (TEMOVATE) 0.05 % cream Apply topically 2 (two) times a day    [DISCONTINUED] docusate sodium (COLACE) 100 mg capsule Take 1 capsule (100 mg total) by mouth 2 (two) times a day    [DISCONTINUED] naproxen (Naprosyn) 500 mg tablet Take 1 tablet (500 mg total) by mouth 2 (two) times a day with meals for 21 days (Patient not taking: Reported on 9/19/2024)    [DISCONTINUED] phentermine 37.5 MG capsule Take by mouth    [DISCONTINUED] Ruxolitinib Phosphate 1.5 % CREA Use to affected areas of scalp twice a day. (Patient not taking: Reported on 11/18/2024)    [DISCONTINUED] topiramate (TOPAMAX) 25 mg tablet Take by mouth     No current facility-administered medications on file prior to visit.     She is allergic to morphine..    Review of Systems   Constitutional:  Negative for fever.   Respiratory:  Negative for shortness of breath.    Cardiovascular:  Negative for chest pain and palpitations.   Gastrointestinal:  Negative for abdominal pain, constipation, diarrhea and vomiting.   Genitourinary:  Negative for difficulty urinating.   Skin:  Negative for rash.   Neurological:  Negative for headaches.   Psychiatric/Behavioral:  Negative for dysphoric mood. The patient is not nervous/anxious.        Objective:    /73   Pulse 70   Temp (!) 96.9 °F (36.1 °C)   Resp 16   Ht 5' 4\" (1.626 m)   Wt 64.8 kg (142 lb 12.8 oz)   BMI 24.51 kg/m²      Physical Exam  Vitals and nursing note reviewed.   Constitutional:       General: She is not in acute distress.     Appearance: She is well-developed. She is obese.   HENT:      Head: Normocephalic and " atraumatic.   Eyes:      Conjunctiva/sclera: Conjunctivae normal.   Neck:      Thyroid: No thyromegaly.   Pulmonary:      Effort: Pulmonary effort is normal. No respiratory distress.   Skin:     Findings: No rash (visible).   Neurological:      Mental Status: She is alert and oriented to person, place, and time.   Psychiatric:         Mood and Affect: Mood normal.         Behavior: Behavior normal.

## 2025-02-17 ENCOUNTER — TELEPHONE (OUTPATIENT)
Age: 45
End: 2025-02-17

## 2025-02-17 NOTE — TELEPHONE ENCOUNTER
Pt calling in re recent labs ordered by Marc. Inquiring if she is testing for diabetes. Noted glucose included in CMP. Added there is uncollected A1c from pcp ordered from November. No other questions at this time.

## 2025-02-21 LAB — COLOGUARD RESULT REPORTABLE: NEGATIVE

## 2025-03-28 ENCOUNTER — APPOINTMENT (OUTPATIENT)
Dept: LAB | Facility: HOSPITAL | Age: 45
End: 2025-03-28
Payer: COMMERCIAL

## 2025-03-28 DIAGNOSIS — Z13.6 SCREENING FOR CARDIOVASCULAR CONDITION: ICD-10-CM

## 2025-03-28 DIAGNOSIS — Z86.39 HISTORY OF OBESITY: ICD-10-CM

## 2025-03-28 DIAGNOSIS — R73.03 PREDIABETES: ICD-10-CM

## 2025-03-28 LAB
ALBUMIN SERPL BCG-MCNC: 4.4 G/DL (ref 3.5–5)
ALP SERPL-CCNC: 52 U/L (ref 34–104)
ALT SERPL W P-5'-P-CCNC: 12 U/L (ref 7–52)
ANION GAP SERPL CALCULATED.3IONS-SCNC: 7 MMOL/L (ref 4–13)
AST SERPL W P-5'-P-CCNC: 21 U/L (ref 13–39)
BASOPHILS # BLD AUTO: 0.03 THOUSANDS/ÂΜL (ref 0–0.1)
BASOPHILS NFR BLD AUTO: 1 % (ref 0–1)
BILIRUB SERPL-MCNC: 0.6 MG/DL (ref 0.2–1)
BUN SERPL-MCNC: 17 MG/DL (ref 5–25)
CALCIUM SERPL-MCNC: 9.9 MG/DL (ref 8.4–10.2)
CHLORIDE SERPL-SCNC: 104 MMOL/L (ref 96–108)
CO2 SERPL-SCNC: 28 MMOL/L (ref 21–32)
CREAT SERPL-MCNC: 0.99 MG/DL (ref 0.6–1.3)
EOSINOPHIL # BLD AUTO: 0.11 THOUSAND/ÂΜL (ref 0–0.61)
EOSINOPHIL NFR BLD AUTO: 3 % (ref 0–6)
ERYTHROCYTE [DISTWIDTH] IN BLOOD BY AUTOMATED COUNT: 12.8 % (ref 11.6–15.1)
GFR SERPL CREATININE-BSD FRML MDRD: 68 ML/MIN/1.73SQ M
GLUCOSE P FAST SERPL-MCNC: 69 MG/DL (ref 65–99)
HCT VFR BLD AUTO: 37.9 % (ref 34.8–46.1)
HGB BLD-MCNC: 12.7 G/DL (ref 11.5–15.4)
IMM GRANULOCYTES # BLD AUTO: 0 THOUSAND/UL (ref 0–0.2)
IMM GRANULOCYTES NFR BLD AUTO: 0 % (ref 0–2)
LYMPHOCYTES # BLD AUTO: 1.91 THOUSANDS/ÂΜL (ref 0.6–4.47)
LYMPHOCYTES NFR BLD AUTO: 50 % (ref 14–44)
MCH RBC QN AUTO: 32.2 PG (ref 26.8–34.3)
MCHC RBC AUTO-ENTMCNC: 33.5 G/DL (ref 31.4–37.4)
MCV RBC AUTO: 96 FL (ref 82–98)
MONOCYTES # BLD AUTO: 0.32 THOUSAND/ÂΜL (ref 0.17–1.22)
MONOCYTES NFR BLD AUTO: 8 % (ref 4–12)
NEUTROPHILS # BLD AUTO: 1.46 THOUSANDS/ÂΜL (ref 1.85–7.62)
NEUTS SEG NFR BLD AUTO: 38 % (ref 43–75)
NRBC BLD AUTO-RTO: 0 /100 WBCS
PLATELET # BLD AUTO: 193 THOUSANDS/UL (ref 149–390)
PMV BLD AUTO: 10.7 FL (ref 8.9–12.7)
POTASSIUM SERPL-SCNC: 4.7 MMOL/L (ref 3.5–5.3)
PROT SERPL-MCNC: 7.8 G/DL (ref 6.4–8.4)
RBC # BLD AUTO: 3.95 MILLION/UL (ref 3.81–5.12)
SODIUM SERPL-SCNC: 139 MMOL/L (ref 135–147)
WBC # BLD AUTO: 3.83 THOUSAND/UL (ref 4.31–10.16)

## 2025-03-28 PROCEDURE — 85025 COMPLETE CBC W/AUTO DIFF WBC: CPT

## 2025-03-28 PROCEDURE — 36415 COLL VENOUS BLD VENIPUNCTURE: CPT

## 2025-03-28 PROCEDURE — 80053 COMPREHEN METABOLIC PANEL: CPT

## 2025-04-16 ENCOUNTER — TELEPHONE (OUTPATIENT)
Dept: BARIATRICS | Facility: CLINIC | Age: 45
End: 2025-04-16

## 2025-04-16 NOTE — TELEPHONE ENCOUNTER
Patient called the RX Refill Line. Message is being forwarded to the office.     Patient is requesting a message be sent to the office regarding the following medication:    tirzepatide (Zepbound) 5 mg/0.5 mL auto-injector    [id number] - [place] - [address]  [address] [facility] [place] [address]  Phone: [phone]  Fax: [phone]     Patient states that she has seen without medication for one week. She believe that her last injection was 4/7/2025. She explained that the Pharmacy is requesting for the medication to be approved although refills are active. She is unable if they need a verbal or prior auth. Please review.     Please contact patient at [phone]

## 2025-04-17 ENCOUNTER — TELEPHONE (OUTPATIENT)
Dept: BARIATRICS | Facility: CLINIC | Age: 45
End: 2025-04-17

## 2025-04-17 DIAGNOSIS — R73.03 PREDIABETES: ICD-10-CM

## 2025-04-17 DIAGNOSIS — Z86.39 HISTORY OF OBESITY: ICD-10-CM

## 2025-04-17 RX ORDER — TIRZEPATIDE 5 MG/.5ML
5 INJECTION, SOLUTION SUBCUTANEOUS WEEKLY
Qty: 2 ML | Refills: 3 | Status: SHIPPED | OUTPATIENT
Start: 2025-04-17

## 2025-04-17 NOTE — TELEPHONE ENCOUNTER
PA for Zepbound 5mg  APPROVED     Date(s) approved 4/17/2025 - 4/17/2026    Case #    Patient advised by          []MyChart Message  [x]Phone call   []LMOM  []L/M to call office as no active Communication consent on file  []Unable to leave detailed message as VM not approved on Communication consent       Pharmacy advised by    [x]Fax  []Phone call  []Secure Chat    Specialty Pharmacy    []     Approval letter scanned into Media Yes

## 2025-04-17 NOTE — TELEPHONE ENCOUNTER
PA for Zepbound 5mg SUBMITTED to [employer]     via    [x]CMM-KEY: [id number]  []Surescripts-Case ID #   []Availity-Auth ID # NDC #   []Faxed to plan   []Other website   []Phone call Case ID #     []PA sent as URGENT    All office notes, labs and other pertaining documents and studies sent. Clinical questions answered. Awaiting determination from insurance company.     Turnaround time for your insurance to make a decision on your Prior Authorization can take 7-21 business days.

## 2025-04-30 ENCOUNTER — ANNUAL EXAM (OUTPATIENT)
Dept: OBGYN CLINIC | Facility: CLINIC | Age: 45
End: 2025-04-30
Payer: COMMERCIAL

## 2025-04-30 VITALS
HEIGHT: 64 IN | DIASTOLIC BLOOD PRESSURE: 62 MMHG | WEIGHT: 146 LBS | SYSTOLIC BLOOD PRESSURE: 104 MMHG | BODY MASS INDEX: 24.92 KG/M2

## 2025-04-30 DIAGNOSIS — Z12.31 ENCOUNTER FOR SCREENING MAMMOGRAM FOR BREAST CANCER: ICD-10-CM

## 2025-04-30 DIAGNOSIS — Z01.419 ENCOUNTER FOR GYNECOLOGICAL EXAMINATION WITHOUT ABNORMAL FINDING: Primary | ICD-10-CM

## 2025-04-30 DIAGNOSIS — N95.1 SYMPTOMATIC MENOPAUSAL OR FEMALE CLIMACTERIC STATES: ICD-10-CM

## 2025-04-30 PROCEDURE — S0612 ANNUAL GYNECOLOGICAL EXAMINA: HCPCS | Performed by: NURSE PRACTITIONER

## 2025-04-30 NOTE — PATIENT INSTRUCTIONS
"Patient Education     Diet and health   The Basics   Written by the doctors and editors at [facility]   Why is it important to eat a healthy diet? -- It's important to eat a healthy diet because eating the right foods can keep you healthy now and later in life. It can lower the risk of problems like heart disease, diabetes, high blood pressure, and some types of cancer. It can also help you live longer and improve your quality of life.  What kind of diet is best? -- There is no 1 specific diet that experts recommend for everyone. People choose what foods to eat for many different reasons. These include personal preference, culture, Church, allergies or intolerances, and nutritional goals. People also need to consider the cost and availability of different foods.  In general, experts recommend a diet that:   Includes lots of vegetables, fruits, beans, nuts, and whole grains   Limits red and processed meats, unhealthy fats, sugar, salt, and alcohol  What are dietary patterns? -- A dietary \"pattern\" means generally eating certain types of foods while limiting others. Some people need to follow a specific dietary pattern because of their health needs. For example, if you have high blood pressure, your doctor might recommend a diet low in salt.  If you are trying to improve your health in general, choosing a healthy dietary pattern can help. This does not have to mean being very strict about what you eat or avoid. The goal is to think about getting plenty of healthy foods while limiting less healthy foods.  Examples of dietary patterns include:   Mediterranean diet - This involves eating a lot of fruits, vegetables, nuts, and whole grains, and uses olive oil instead of other fats. It also includes some fish, poultry, and dairy products, but not a lot of red meat. Following this diet can help your overall health, and might even lower your risk of having a stroke.   Plant-based diets - These patterns focus on vegetables, " "fruits, grains, beans, and nuts. They limit or avoid food that comes from animals, such as meat and dairy. There are different types of plant-based diets, including vegetarian and vegan.   Low-fat diet - A low-fat diet involves limiting calories from fat. This might help some people keep weight off if that is their goal, but it does not have many other health benefits. If you choose to follow a low-fat diet, it is also important to focus on getting lots of whole grains, legumes, fruits, and vegetables. Limit refined grains and sugar.   Low-cholesterol diet - Cholesterol is found in foods with a lot of saturated fat, like red meat, butter, and cheese. A low-cholesterol diet focuses on limiting the amount of cholesterol that you eat. Limiting the cholesterol in your diet can also help lower the amount of unhealthy fats that you eat.  Which foods are especially healthy? -- Foods that are especially healthy include:   Fruits and vegetables - Eating a diet with lots of fruits and vegetables can help prevent heart disease and stroke. It might also help prevent certain types of cancer. Try to eat fruits and vegetables at each meal and also for snacks. If you don't have fresh fruits and vegetables available, you can eat frozen or canned ones instead. Doctors recommend eating at least 5 servings of fruits or vegetables each day.   Whole grains - Whole-grain foods include 100 percent whole-wheat bread, steel cut oats, and whole-grain pasta. These are healthier than foods made with \"refined\" grains, like white bread and white rice. Eating lots of whole grains instead of refined grains has been shown to help with weight control. It can also lower the risk of several health problems, including colon cancer, heart disease, and diabetes. Doctors recommend that most people try to eat 5 to 8 servings of whole-grain, high-fiber foods each day.   Foods with fiber - Eating foods with a lot of fiber can help prevent heart disease and " "stroke. If you have type 2 diabetes, it can also help control your blood sugar. Foods that have a lot of fiber include vegetables, fruits, beans, nuts, oatmeal, and whole-grain breads and cereals. You can tell how much fiber is in a food by reading the nutrition label (figure 1). Doctors recommend that most people eat about 25 to 34 grams of fiber each day.   Foods with calcium and vitamin D - Babies, children, and adults need calcium and vitamin D to help keep their bones strong. Adults also need calcium and vitamin D to help prevent osteoporosis. Osteoporosis is a condition that causes bones to get \"thin\" and break more easily than usual. Different foods and drinks have calcium and vitamin D in them (figure 2). People who don't get enough calcium and vitamin D in their diet might need to take a supplement. Doctors recommend that most people have 2 to 3 servings of foods with calcium and vitamin D each day.   Foods with protein - Protein helps your muscles and bones stay strong. Healthy foods with a lot of protein include chicken, fish, eggs, beans, nuts, and soy products. Red meat also has a lot of protein, but it also contains fats, which can be unhealthy. Doctors recommend that most people try to eat about 5 servings of protein each day.   Healthy fats - There are different types of fats. Some types of fats are better for your body than others. Healthy fats are \"monounsaturated\" or \"polyunsaturated\" fats. These are found in fatty fish, nuts and nut butters, and avocados. Use plant-based oils when cooking. Examples of these oils include olive, canola, safflower, sunflower, and corn oil. Eating foods with healthy fats, while avoiding or limiting foods with unhealthy fats, might lower the risk of heart disease.   Foods with folate - Folate is a vitamin that is important for pregnant people, since it helps prevent certain birth defects. It is also called \"folic acid.\" Anyone who could get pregnant should get at " "least 400 micrograms of folic acid daily, whether or not they are actively trying to get pregnant. Folate is found in many breakfast cereals, oranges, orange juice, and green leafy vegetables.  What foods should I avoid or limit? -- To eat a healthy diet, there are some things that you should avoid or limit. They include:   Unhealthy fats - \"Trans\" fats are especially unhealthy. They are found in margarines, many fast foods, and some store-bought baked goods. \"Saturated\" fats are found in animal products like meats, egg yolks, butter, cheese, and full-fat milk products. Unhealthy fats can raise your cholesterol level and increase your chance of getting heart disease.   Sugar - To have a healthy diet, it's important to limit or avoid added sugar, sweets, and refined grains. Refined grains are found in white bread, white rice, most pastas, and most packaged \"snack\" foods.  Avoiding sugar-sweetened beverages, like soda and sports drinks, can also help improve your health.  Avoid canned fruits in \"heavy\" syrup.   Red and processed meats - Studies have shown that eating a lot of red meat can increase your risk of certain health problems, including heart disease and cancer. You should limit the amount of red meat that you eat. This is also true for processed meats like sausage, hot dogs, and lu.  Can I drink alcohol as part of a healthy diet? -- Not drinking alcohol at all is the healthiest choice. Regular drinking can raise a person's chances of getting liver disease and certain types of cancers. In females, even 1 drink a day can increase the risk of getting breast cancer.  If you do choose to drink, most doctors recommend limiting alcohol to no more than:   1 drink a day for females   2 drinks a day for males  The limits are different because, generally, the female body takes longer to break down alcohol.  How many calories do I need each day? -- Calories give your body energy. The number of calories that you need " "each day depends on your weight, height, age, sex, and how active you are.  Your doctor or nurse can tell you about how many calories you should eat each day. You can also work with a dietitian (nutrition expert) to learn more about your dietary needs and options.  What if I am having trouble improving my diet? -- It can be hard to change the way that you eat. Remember that even small changes can improve your health.  Here are some tips that might help:   Try to make fruits and vegetables part of every meal. If you don't have fresh fruits and vegetables, frozen or canned are good options. Look for products without added salt or sugar.   Keep a bowl of fruit out for snacking.   When you can, choose whole grains instead of refined grains. Choose chicken, fish, and beans instead of red meat and cheese.   Try to eat prepared and processed foods less often.   Try flavored seltzer or water instead of soda or juice.   When eating at fast food restaurants, look for healthier items, like broiled chicken or salad.  If you have questions about which foods you should or should not eat, ask your doctor, nurse, or dietitian. The right diet for you will depend, in part, on your health and any medical conditions you have.  All topics are updated as new evidence becomes available and our peer review process is complete.  This topic retrieved from [employer] on: Feb 28, 2024.  Topic 54275 Version 28.0  Release: 32.2.4 - C32.58  © 2024 UpToDate, Inc. and/or its affiliates. All rights reserved.  figure 1: Nutrition label - Fiber     This is an example of a nutrition label. To figure out how much fiber is in a food, look for the line that says \"Dietary Fiber.\" It's also important to look at the serving size. This food has 7 grams of fiber in each serving, and each serving is 1 cup.  Graphic 64978 Version 8.0  figure 2: Foods and drinks with calcium and vitamin D     Foods rich in calcium include ice cream, soy milk, breads, kale, " "broccoli, milk, cheese, cottage cheese, almonds, yogurt, ready-to-eat cereals, beans, and tofu. Foods rich in vitamin D include milk, fortified plant-based \"milks\" (soy, almond), canned tuna fish, cod liver oil, yogurt, ready-to-eat-cereals, cooked salmon, canned sardines, mackerel, and eggs. Some of these foods are rich in both.  Graphic 61381 Version 4.0  Consumer Information Use and Disclaimer   Disclaimer: This generalized information is a limited summary of diagnosis, treatment, and/or medication information. It is not meant to be comprehensive and should be used as a tool to help the user understand and/or assess potential diagnostic and treatment options. It does NOT include all information about conditions, treatments, medications, side effects, or risks that may apply to a specific patient. It is not intended to be medical advice or a substitute for the medical advice, diagnosis, or treatment of a health care provider based on the health care provider's examination and assessment of a patient's specific and unique circumstances. Patients must speak with a health care provider for complete information about their health, medical questions, and treatment options, including any risks or benefits regarding use of medications. This information does not endorse any treatments or medications as safe, effective, or approved for treating a specific patient. UpToDate, Inc. and its affiliates disclaim any warranty or liability relating to this information or the use thereof.The use of this information is governed by the Terms of Use, available at https://www.wolterskluwer.com/en/know/clinical-effectiveness-terms. 2024© UpToDate, Inc. and its affiliates and/or licensors. All rights reserved.  Copyright   © 2024 UpToDate, Inc. and/or its affiliates. All rights reserved.  Patient Education     Calcium Rich Diet   About this topic   Calcium is one of the most important minerals and is found in almost all parts of your " body. It makes your teeth and bones strong and healthy. Your body does not make calcium. It is important for you to eat calcium rich foods to get enough calcium. It also helps your body:  Make blood clots  Keep your heartbeat normal  Helps blood move throughout the body  Release hormones  Release enzymes  Send and get signals between your nerves and brain  Make muscles work well         What will the results be?   It is important to have a good amount of calcium in your food. Calcium helps your body work well. It is very important during every life stage. Calcium may also keep you from losing bone mass. This is osteopenia. It may help keep you from having weak bones. This is osteoporosis.  What drugs may be needed?   The doctor may order calcium and vitamin D supplements. You need vitamin D to help your body take in the calcium. Your calcium supplement may have vitamin D in it.  Talk to your doctor about:  If it is OK to take your calcium with food.  How often to take your calcium supplement throughout the day.  All the drugs you are taking. Be sure to include all prescription and over-the-counter (OTC) drugs, and herbal supplements. Tell the doctor about any drug allergy. Bring a list of drugs you take with you. Some drugs may cause problems with how your body takes in calcium.  Will physical activity be limited?   No, physical activities will not be limited. It is good for you to do light exercises and to stay active.  What changes to diet are needed?   You will need to watch how much calcium is in the foods you eat. Your doctor will talk to you about the right amount of calcium for you. How much calcium you need is based on your age and life stage.  When is this diet used?   This diet is used when your normal diet is low in calcium. It is needed to raise the amount of calcium in your diet. Our bodies do not take in calcium well as we get older.  Who should not use this diet?   People with too much calcium in  their blood should not use this diet. This is called hypercalcemia.  What foods are good to eat?   Milk, yogurt, and cheese products are naturally high in calcium.  These foods have smaller amounts of calcium. If they are eaten often and in large amounts they can be good sources of calcium:  Oysters; dried fruit like figs and raisins; green leafy vegetables like broccoli, collards, kale, mustard greens, bok job; soy beans; oranges  [place] and sardines. Be sure to eat the soft bones.  Nuts like almonds and [place] nuts, sunflower seeds, tahini, dried beans  Food products with calcium added to them like orange juice, tofu, cereal, bread; almond milk, rice milk, soy milk  What foods should be limited or avoided?   People who eat many kinds of foods do not have to be worried about limiting or avoiding certain foods.   What problems could happen?   Some people may get kidney stones. This may happen after taking high amounts of calcium over a long time. Calcium from food does not seem to cause kidney stones.  Hard stools.  Too much calcium may interfere with your body’s ability to absorb iron and zinc.  When do I need to call the doctor?   Call your doctor if you have concerns about your diet. Tell your doctor if you are allergic to any of the foods in your diet.  Helpful tips   If you have problems digesting milk, try lactose-free milk. You may also use a product to help you take in lactose.  Talk with your doctor if you are a vegetarian and do not eat dairy products. Your doctor can help you make sure you get the amount of calcium your body needs.  Last Reviewed Date   2021-03-12  Consumer Information Use and Disclaimer   This generalized information is a limited summary of diagnosis, treatment, and/or medication information. It is not meant to be comprehensive and should be used as a tool to help the user understand and/or assess potential diagnostic and treatment options. It does NOT include all information about  conditions, treatments, medications, side effects, or risks that may apply to a specific patient. It is not intended to be medical advice or a substitute for the medical advice, diagnosis, or treatment of a health care provider based on the health care provider's examination and assessment of a patient’s specific and unique circumstances. Patients must speak with a health care provider for complete information about their health, medical questions, and treatment options, including any risks or benefits regarding use of medications. This information does not endorse any treatments or medications as safe, effective, or approved for treating a specific patient. UpToDate, Inc. and its affiliates disclaim any warranty or liability relating to this information or the use thereof. The use of this information is governed by the Terms of Use, available at https://www.GoodClic.com/en/know/clinical-effectiveness-terms   Copyright   Copyright © 2024 UpToDate, Inc. and its affiliates and/or licensors. All rights reserved.  Patient Education     Exercise and movement   The Basics   Written by the doctors and editors at [employer]   What are the benefits of movement? -- Moving your body has many benefits. It can:   Burn calories, which helps people manage their weight   Help control blood sugar levels in people with diabetes   Lower blood pressure, especially in people with high blood pressure   Lower stress, and help with depression and anxiety   Keep bones strong, so they don't get thin and break easily   Lower the chance of dying from heart disease  Adding even small amounts of physical activity to your daily routine can improve your health.  What are the main types of exercise? -- There are 3 main types of exercise:   Aerobic exercise - This raises your heart rate. Examples include walking, running, dancing, riding a bike, and swimming.   Muscle strengthening - This helps make your muscles stronger. You can do it using  weights, exercise bands, or weight machines. You can also use your own body weight, as with push-ups, or by lifting items in your home, like jugs of water.   Stretching - These help your muscles and joints move more easily.  It's important to have all 3 types in your exercise program. That way, your body, muscles, and joints can be as healthy as possible.  Should I talk to my doctor or nurse before I start exercising? -- If you have not exercised before or have not exercised in a long time, talk with your doctor or nurse before you start a very active exercise program.  If you have heart disease or risk factors for heart disease (like high blood pressure or diabetes), your doctor or nurse might recommend that you have an exercise test before starting an exercise program.  When you begin an exercise program, start slowly. For example, do the exercise at a slow pace or for a few minutes only. Over time, you can exercise faster and for longer periods of time.  What should I do when I exercise? -- Each time you exercise, you should:   Warm up - This can help keep you from hurting your muscles when you exercise. To warm up, do a light aerobic exercise (such as walking slowly) or stretch for 5 to 10 minutes.   Work out - Try to get a mix of aerobic exercise, muscle strengthening, and stretching. During an aerobic workout, you can walk fast, swim, run, or use an exercise machine, for example. Other activities, like dancing or playing tennis, are also forms of aerobic exercise. You should also take time to stretch all of your joints, including your neck, shoulders, back, hips, and knees. At least 2 times a week, you can do muscle strengthening exercises as part of your workout.   Cool down - This helps keep you from feeling dizzy after you exercise and helps prevent muscle cramps. To cool down, you can stretch or do a light aerobic exercise for 5 minutes.  Some people go to a gym or do group exercise classes. But you can  exercise even without these things. Some exercises can be done even in a small space. You can also try online videos or smartphone apps to get ideas for different types of exercise.  How often should I exercise? -- Doctors recommend that people exercise at least 30 minutes a day, on 5 or more days of the week.  If you can't exercise for 30 minutes straight, try to exercise for 10 minutes at a time, 3 or 4 times a day. Even exercising for shorter amounts of time is good for you, especially if it means spending less time sitting.  When should I call my doctor or nurse? -- If you have any of the following symptoms when you exercise, stop exercising and call your doctor or nurse right away:   Pain or pressure in your chest, arms, throat, jaw, or back   Nausea or vomiting   Feeling like your heart is fluttering or racing very fast   Feeling dizzy or faint  What if I don't have time to exercise? -- Many people have very busy lives and might not think that they have time to exercise. But it's important to try to find time, even if you are tired or work a lot. Exercise can increase your energy level, which can make you feel better and might even help you get more work done.  Even if it's hard to set aside a lot of time to exercise, you can still improve your health by moving your body more. There are many ways that you can be more active. For example, you can:   Take the stairs instead of the elevator.   Park in a parking space that is farther away from the door.   Take a longer route when you walk from one place to another.  Spending a lot of time sitting still (for example, watching TV or working on the computer) is bad for your health. Try to get up and move around whenever you can. Even small amounts of movement, like taking short walks, doing household chores, or gardening, can improve your health. Finding activities that you enjoy, or doing them with other people, can help you add more movement into your daily  life.  What else should I do when I exercise? -- To exercise safely and avoid problems, it's important to:   Drink fluids during and after exercising (but avoid drinks with a lot of caffeine or sugar).   Avoid exercising outside if it is too hot or cold.   Wear layers of clothes, so that you can take them off if you get too hot.   Wear shoes that fit well and support your feet.   Be aware of your surroundings if you exercise outside.  All topics are updated as new evidence becomes available and our peer review process is complete.  This topic retrieved from [employer] on: May 18, 2024.  Topic 22050 Version 31.0  Release: 32.4.3 - C32.137  © 2024 UpToDate, Inc. and/or its affiliates. All rights reserved.  Consumer Information Use and Disclaimer   Disclaimer: This generalized information is a limited summary of diagnosis, treatment, and/or medication information. It is not meant to be comprehensive and should be used as a tool to help the user understand and/or assess potential diagnostic and treatment options. It does NOT include all information about conditions, treatments, medications, side effects, or risks that may apply to a specific patient. It is not intended to be medical advice or a substitute for the medical advice, diagnosis, or treatment of a health care provider based on the health care provider's examination and assessment of a patient's specific and unique circumstances. Patients must speak with a health care provider for complete information about their health, medical questions, and treatment options, including any risks or benefits regarding use of medications. This information does not endorse any treatments or medications as safe, effective, or approved for treating a specific patient. UpToDate, Inc. and its affiliates disclaim any warranty or liability relating to this information or the use thereof.The use of this information is governed by the Terms of Use, available at  https://www.woltersMixxuwer.com/en/know/clinical-effectiveness-terms. 2024© [employer] and its affiliates and/or licensors. All rights reserved.  Copyright   © 2024 [employer] and/or its affiliates. All rights reserved.

## 2025-04-30 NOTE — PROGRESS NOTES
Assessment / Plan    Assessment & Plan  Encounter for gynecological examination without abnormal finding  Normal well woman exam.  Post benign hysterectomy at age 28.  Cervical cancer screening is no longer indicated.  Up to date on colonoscopy       Encounter for screening mammogram for breast cancer    Orders:    Mammo screening bilateral w 3d and cad; Future    Symptomatic menopausal or female climacteric states  Return visit for discussion/ management.           Subjective      Bisi Orozco is a 44 y.o.  postmenopausal female who presents for her annual gynecologic exam.    2024 estradiol 15.7, FSH 92.5 (menopausal)-- suffering with hot flashes and night sweats, more morgan.  Has not been able to follow up and desires management.  Advised of need for separate problem focused visit.  She will schedule a virtual visit, as she has trouble getting off from work.     hysterectomy, benign (age 28); for abn bleeding; ovaries in situ.  Last pap: n/a (hyst )  Pap hx: normal  STD hx: none  Last mammogram: 2025 normal  2025 Colonoscopy, polypectomies, rpt  7y; 2025 cologuard normal    Sexually active: yes, partner of 9 yrs  Condom use: no  Nutrition: Body mass index is 25.06 kg/m².; given guidelines  Calcium intake: given guidelines  Exercise: no; given guidelines  Safety: feels safe    Periods are absent  Current contraception: status post hysterectomy  History of abnormal Pap smear: no  Family history of breast,uterine, ovarian or colon cancer: no    The following portions of the patient's history were reviewed and updated as appropriate: allergies, current medications, past family history, past medical history, past social history, past surgical history, and problem list.    Review of Systems      Review of Systems   Constitutional:  Negative for chills and fever.   Respiratory:  Negative for cough and shortness of breath.    Gastrointestinal:  Negative for abdominal distention, abdominal pain,  "blood in stool, constipation, diarrhea, nausea and vomiting.   Endocrine: Positive for heat intolerance (hot flashes/night sweats).   Genitourinary:  Negative for difficulty urinating, dysuria, frequency, genital sores, hematuria, menstrual problem, pelvic pain, urgency, vaginal bleeding and vaginal discharge.   Musculoskeletal:  Negative for arthralgias and myalgias.     Breasts:  Negative for skin changes, dimpling, asymmetry, nipple discharge, redness, tenderness or palpable masses    Objective     *patient agrees to exam without a chaperone present.     /62 (BP Location: Left arm, Patient Position: Sitting, Cuff Size: Standard)   Ht 5' 4\" (1.626 m)   Wt 66.2 kg (146 lb)   BMI 25.06 kg/m²   Physical Exam  Constitutional:       General: She is not in acute distress.     Appearance: Normal appearance. She is well-developed and normal weight. She is not ill-appearing or diaphoretic.   HENT:      Head: Normocephalic and atraumatic.   Eyes:      Pupils: Pupils are equal, round, and reactive to light.   Neck:      Thyroid: No thyromegaly.   Pulmonary:      Effort: Pulmonary effort is normal.   Chest:   Breasts:     Breasts are symmetrical.      Right: No inverted nipple, mass, nipple discharge, skin change or tenderness.      Left: No inverted nipple, mass, nipple discharge, skin change or tenderness.   Abdominal:      General: There is no distension.      Palpations: Abdomen is soft. There is no mass.      Tenderness: There is no abdominal tenderness. There is no guarding or rebound.   Genitourinary:     General: Normal vulva.      Exam position: Lithotomy position.      Labia:         Right: No rash, tenderness, lesion or injury.         Left: No rash, tenderness, lesion or injury.       Vagina: No signs of injury and foreign body. No vaginal discharge, erythema, tenderness or bleeding.      Adnexa:         Right: No mass or tenderness.          Left: No mass or tenderness.        Rectum: Normal.      " Comments: Cervix and uterus are surgically absent  Musculoskeletal:      Cervical back: Neck supple.   Lymphadenopathy:      Cervical: No cervical adenopathy.      Upper Body:      Right upper body: No supraclavicular adenopathy.      Left upper body: No supraclavicular adenopathy.   Skin:     General: Skin is warm and dry.   Neurological:      General: No focal deficit present.      Mental Status: She is alert and oriented to person, place, and time.   Psychiatric:         Mood and Affect: Mood normal.         Behavior: Behavior normal.         Thought Content: Thought content normal.         Judgment: Judgment normal.

## 2025-05-06 ENCOUNTER — TELEPHONE (OUTPATIENT)
Dept: GASTROENTEROLOGY | Facility: MEDICAL CENTER | Age: 45
End: 2025-05-06

## 2025-05-06 NOTE — TELEPHONE ENCOUNTER
Left voicemail and requested call back     Called pt to inform that follow up has to be rescheduled due to provider template change, 06/03 to 06/04 at 3:20 PM. Asked pt to please give us a call to confirm that the date and time is ok. Will send a [employer] message concerning this

## 2025-05-13 DIAGNOSIS — R73.03 PREDIABETES: ICD-10-CM

## 2025-05-13 DIAGNOSIS — Z86.39 HISTORY OF OBESITY: ICD-10-CM

## 2025-05-13 NOTE — TELEPHONE ENCOUNTER
Patient called the RX Refill Line. Message is being forwarded to the office.     Patient is requesting a dose increase for tirzepatide (Zepbound). Please send to  Pharmacy    Please contact patient at [phone]

## 2025-05-13 NOTE — TELEPHONE ENCOUNTER
"  Name of Medication: “What is the medication you are calling about?” Zepbound    Dosage: “What dose are you currently taking” 5mg    Question: “What is your question?” (Medication refill, side effect, reaction) refill, increase dose    Prescribing HCP: “Who prescribed it?” DAGOBERTO Hansen PA-C    Symptoms: “Do you have any symptoms?” still hungry    Severity: If the symptoms are present are the mild, moderate, severe? PT is worried that she'll increase in weight. PT is \"suffering\" by still feeling hungry.     Last visit weight: 142.8lbs    Current weight: 140lbs (goal weight)    Date of last injection: Monday    How many injections do you have left: 0       "

## 2025-05-14 ENCOUNTER — TELEPHONE (OUTPATIENT)
Dept: BARIATRICS | Facility: CLINIC | Age: 45
End: 2025-05-14

## 2025-05-14 ENCOUNTER — TELEMEDICINE (OUTPATIENT)
Dept: OBGYN CLINIC | Facility: CLINIC | Age: 45
End: 2025-05-14

## 2025-05-14 VITALS — BODY MASS INDEX: 24.41 KG/M2 | HEIGHT: 64 IN | WEIGHT: 143 LBS

## 2025-05-14 DIAGNOSIS — N95.1 SYMPTOMATIC MENOPAUSAL OR FEMALE CLIMACTERIC STATES: Primary | ICD-10-CM

## 2025-05-14 NOTE — PATIENT INSTRUCTIONS
Patient Education     Fezolinetant (KOBI Caputo)   Brand Names: [place] Veozah   What is this drug used for?   It is used to treat hot flashes caused by menopause.  What do I need to tell my doctor BEFORE I take this drug?   If you are allergic to this drug; any part of this drug; or any other drugs, foods, or substances. Tell your doctor about the allergy and what signs you had.  If you have any of the following conditions: Kidney or liver disease.  If you are taking fluvoxamine, mexiletine, or cimetidine.  This is not a list of all drugs or health problems that interact with this drug.  Tell your doctor and pharmacist about all of your drugs (prescription or OTC, natural products, vitamins) and health problems. You must check to make sure that it is safe for you to take this drug with all of your drugs and health problems. Do not start, stop, or change the dose of any drug without checking with your doctor.  What are some things I need to know or do while I take this drug?   Tell all of your health care providers that you take this drug. This includes your doctors, nurses, pharmacists, and dentists.  Have blood work checked as you have been told by the doctor. Talk with the doctor.  Tell your doctor if you are pregnant, plan on getting pregnant, or are breast-feeding. You will need to talk about the benefits and risks to you and the baby.  What are some side effects that I need to call my doctor about right away?   WARNING/CAUTION: Even though it may be rare, some people may have very bad and sometimes deadly side effects when taking a drug. Tell your doctor or get medical help right away if you have any of the following signs or symptoms that may be related to a very bad side effect:  Signs of an allergic reaction, like rash; hives; itching; red, swollen, blistered, or peeling skin with or without fever; wheezing; tightness in the chest or throat; trouble breathing, swallowing, or talking; unusual hoarseness; or  swelling of the mouth, face, lips, tongue, or throat.  Signs of liver problems like dark urine, tiredness, decreased appetite, upset stomach or stomach pain, light-colored stools, throwing up, or yellow skin or eyes.  What are some other side effects of this drug?   All drugs may cause side effects. However, many people have no side effects or only have minor side effects. Call your doctor or get medical help if any of these side effects or any other side effects bother you or do not go away:  Stomach pain.  Diarrhea.  Trouble sleeping.Patient Education     Paroxetine (aishwarya caraballo teen)   Brand Names: US Brisdelle [DSC]; Paxil; Paxil CR; Pexeva [DSC]   Brand Names: Rudy ACT PARoxetine [DSC]; AG-Paroxetine; APO-PARoxetine; Auro-PARoxetine; BIO-PARoxetine; JAMP-PARoxetine; M-Paroxetine; Mar-PARoxetine; MINT-Paroxetine; NRA-Paroxetine; PARoxetine-10; PARoxetine-20; PARoxetine-30; Paxil; Paxil CR; PMS-PARoxetine; Priva-PARoxetine [DSC]; JAMES-PARoxetine; SANDOZ PARoxetine; TARO-PARoxetine; TEVA-PARoxetine   Warning   Drugs like this one have raised the chance of suicidal thoughts or actions in children and young adults. The risk may be greater in people who have had these thoughts or actions in the past. All people who take this drug need to be watched closely. Call the doctor right away if signs like depression, nervousness, restlessness, grouchiness, panic attacks, or changes in mood or actions are new or worse. Call the doctor right away if any thoughts or actions of suicide occur.  This drug is not approved for use in children. Talk with the doctor.  What is this drug used for?   It is used to treat depression.  It is used to treat obsessive-compulsive problems.  It is used to treat panic attacks.  It is used to treat anxiety.  It is used to treat post-traumatic stress.  It is used to treat mood problems caused by monthly periods.  It is used to treat hot flashes caused by menopause.  It may be given to you for other  reasons. Talk with the doctor.  What do I need to tell my doctor BEFORE I take this drug?   For all uses of this drug:   If you are allergic to this drug; any part of this drug; or any other drugs, foods, or substances. Tell your doctor about the allergy and what signs you had.  If you have narrow-angle glaucoma.  If you are taking any of these drugs: Linezolid, methylene blue, pimozide, or thioridazine.  If you have taken certain drugs for depression or Parkinson's disease in the last 14 days. This includes isocarboxazid, phenelzine, tranylcypromine, selegiline, or rasagiline. Very high blood pressure may happen.  For treating hot flashes caused by menopause:   If you are pregnant or may be pregnant. Do not take this drug if you are pregnant.  This is not a list of all drugs or health problems that interact with this drug.  Tell your doctor and pharmacist about all of your drugs (prescription or OTC, natural products, vitamins) and health problems. You must check to make sure that it is safe for you to take this drug with all of your drugs and health problems. Do not start, stop, or change the dose of any drug without checking with your doctor.  What are some things I need to know or do while I take this drug?   Tell all of your health care providers that you take this drug. This includes your doctors, nurses, pharmacists, and dentists.  Avoid driving and doing other tasks or actions that call for you to be alert until you see how this drug affects you.  Do not stop taking this drug all of a sudden without calling your doctor. You may have a greater risk of side effects. If you need to stop this drug, you will want to slowly stop it as ordered by your doctor.  Avoid drinking alcohol while taking this drug.  Talk with your doctor before you use marijuana, other forms of cannabis, or prescription or OTC drugs that may slow your actions.  It may take several weeks to see the full effects.  This drug may raise the  chance of a broken bone. Talk with the doctor.  This drug may raise the chance of bleeding. Sometimes, bleeding can be life-threatening. Talk with the doctor.  Some people may have a higher chance of eye problems with this drug. Your doctor may want you to have an eye exam to see if you have a higher chance of these eye problems. Call your doctor right away if you have eye pain, change in eyesight, or swelling or redness in or around the eye.  This drug can cause low sodium levels. Very low sodium levels can be life-threatening, leading to seizures, passing out, trouble breathing, or death.  If you are 65 or older, use this drug with care. You could have more side effects.  This drug may affect growth in children and teens in some cases. They may need regular growth checks. Talk with the doctor.  This drug may affect sperm. This may affect being able to father a child. Talk with the doctor.  Tell your doctor if you are pregnant, may be pregnant, or plan to get pregnant while taking this drug. You will need to talk about the benefits and risks to you and the baby. Taking this drug in the first trimester of pregnancy may raise the risk of birth defects (mainly heart defects) in the unborn baby. Taking this drug in the third trimester of pregnancy may raise your risk of bleeding after delivery and may lead to some health problems in the  .  Tell your doctor if you are breast-feeding. You will need to talk about any risks to your baby.  What are some side effects that I need to call my doctor about right away?   WARNING/CAUTION: Even though it may be rare, some people may have very bad and sometimes deadly side effects when taking a drug. Tell your doctor or get medical help right away if you have any of the following signs or symptoms that may be related to a very bad side effect:  Signs of an allergic reaction, like rash; hives; itching; red, swollen, blistered, or peeling skin with or without fever; wheezing;  tightness in the chest or throat; trouble breathing, swallowing, or talking; unusual hoarseness; or swelling of the mouth, face, lips, tongue, or throat.  Signs of low sodium levels like headache, trouble focusing, memory problems, feeling confused, weakness, seizures, or change in balance.  Signs of infection like fever, chills, very bad sore throat, ear or sinus pain, cough, more sputum or change in color of sputum, pain with passing urine, mouth sores, or wound that will not heal.  Signs of bleeding like throwing up or coughing up blood; vomit that looks like coffee grounds; blood in the urine; black, red, or tarry stools; bleeding from the gums; abnormal vaginal bleeding; bruises without a cause or that get bigger; or bleeding you cannot stop.  Severe dizziness or passing out.  Bone pain.  Seizures.  A big weight loss.  A burning, numbness, or tingling feeling that is not normal.  Painful erection (hard penis) or an erection that lasts for longer than 4 hours.  Sex problems have happened with drugs like this one. This includes lowered interest in sex, trouble having an orgasm, ejaculation problems, or trouble getting or keeping an erection. If you have any questions, talk with your doctor.  A severe and sometimes deadly problem called serotonin syndrome may happen. The risk may be greater if you also take certain other drugs. Call your doctor right away if you have agitation; change in balance; confusion; hallucinations; fever; fast or abnormal heartbeat; flushing; muscle twitching or stiffness; seizures; shivering or shaking; sweating a lot; severe diarrhea, upset stomach, or throwing up; or very bad headache.  What are some other side effects of this drug?   All drugs may cause side effects. However, many people have no side effects or only have minor side effects. Call your doctor or get medical help if any of these side effects or any other side effects bother you or do not go away:  Feeling dizzy, sleepy,  tired, or weak.  Feeling nervous and excitable.  Headache.  Constipation, diarrhea, stomach pain, upset stomach, throwing up, or decreased appetite.  Gas.  Dry mouth.  Trouble sleeping.  Shakiness.  Yawning.  Back pain.  Sweating.  These are not all of the side effects that may occur. If you have questions about side effects, call your doctor. Call your doctor for medical advice about side effects.  You may report side effects to your national health agency.  You may report side effects to the FDA at 1-473.266.7748. You may also report side effects at https://www.fda.gov/medwatch.  How is this drug best taken?   Use this drug as ordered by your doctor. Read all information given to you. Follow all instructions closely.  All products:   Take with or without food.  Keep taking this drug as you have been told by your doctor or other health care provider, even if you feel well.  Liquid (suspension):   Shake well before use.  Measure liquid doses carefully. Use the measuring device that comes with this drug. If there is none, ask the pharmacist for a device to measure this drug.  Take this drug in the morning unless your doctor tells you otherwise.  Tablets:   Some brands of this drug may need to be swallowed whole. Check with your doctor or pharmacist if you have questions about whether your brand must be swallowed whole.  Take this drug in the morning unless your doctor tells you otherwise.  Controlled-release tablets:   Swallow whole. Do not chew, break, or crush.  Take this drug in the morning unless your doctor tells you otherwise.  Capsules:   Take this drug at bedtime.  What do I do if I miss a dose?   Take a missed dose as soon as you think about it.  If it is close to the time for your next dose, skip the missed dose and go back to your normal time.  Do not take 2 doses at the same time or extra doses.  How do I store and/or throw out this drug?   All products:   Store at room temperature in a dry place. Do not  store in a bathroom.  Keep all drugs in a safe place. Keep all drugs out of the reach of children and pets.  Throw away unused or  drugs. Do not flush down a toilet or pour down a drain unless you are told to do so. Check with your pharmacist if you have questions about the best way to throw out drugs. There may be drug take-back programs in your area.  Capsules:   Protect from light.  General drug facts   If your symptoms or health problems do not get better or if they become worse, call your doctor.  Do not share your drugs with others and do not take anyone else's drugs.  Some drugs may have another patient information leaflet. If you have any questions about this drug, please talk with your doctor, nurse, pharmacist, or other health care provider.  This drug comes with an extra patient fact sheet called a Medication Guide. Read it with care. Read it again each time this drug is refilled. If you have any questions about this drug, please talk with the doctor, pharmacist, or other health care provider.  If you think there has been an overdose, call your poison control center or get medical care right away. Be ready to tell or show what was taken, how much, and when it happened.  Consumer Information Use and Disclaimer   This generalized information is a limited summary of diagnosis, treatment, and/or medication information. It is not meant to be comprehensive and should be used as a tool to help the user understand and/or assess potential diagnostic and treatment options. It does NOT include all information about conditions, treatments, medications, side effects, or risks that may apply to a specific patient. It is not intended to be medical advice or a substitute for the medical advice, diagnosis, or treatment of a health care provider based on the health care provider's examination and assessment of a patient's specific and unique circumstances. Patients must speak with a health care provider for complete  information about their health, medical questions, and treatment options, including any risks or benefits regarding use of medications. This information does not endorse any treatments or medications as safe, effective, or approved for treating a specific patient. UpToDate, Inc[employer] and its affiliates disclaim any warranty or liability relating to this information or the use thereof. The use of this information is governed by the Terms of Use, available at https://www.wolM:Metricsuwer.com/en/know/clinical-effectiveness-terms.  Last Reviewed Date   2024-02-05  Copyright   © 2024 UpToDate, Inc. and its affiliates and/or licensors. All rights reserved.  Patient Education     Hormone Replacement Therapy in Menopause   About this topic   Hormone replacement therapy is also called HRT. If you have a uterus and ovaries, your body makes less estrogen and progesterone as you get older. Then you stop having periods. This time is called menopause. Menopause often starts when you are around 45 years old. You may not have any problems with menopause. If you have signs that bother you, the doctor may order HRT to help. During menopause, you may have signs like:  Changes in your monthly period before it stops. Periods can get shorter or longer.  Hot flashes  Night sweats  Trouble sleeping  Vaginal dryness, thinning, and other changes which may make sex painful  Mood swings  Bone loss  General   Types of HRT   Estrogen therapy ? You may take estrogen alone if you do not have a uterus. You want to take the smallest dose you can for the shortest amount of time.  Progestin-estrogen therapy ? Also known as combination therapy and uses both estrogen and progesterone. You want to take the smallest dose you can for the shortest amount of time.  Ways to Get Estrogen   A pill by mouth  A tablet or ring in the vagina  Patch to place on the skin  Gel, cream, or spray in the vagina  Implant where the drug is placed under the skin  What Are the  Benefits of HRT?   Helps ease night sweats, vaginal problems, and hot flashes  Helps avoid bone loss  Who Should Use HRT?   Healthy people who have problems from menopause that affect daily life  Who Should Not Use HRT?   You should not take HRT if you:  Have or had breast, ovarian, or endometrial cancer  Have or had heart disease or stroke  Have or had blood clots  Can't control high blood pressure  Have liver problems  Smoke. You should stop smoking before taking HRT.  Can manage your menopause signs  May be pregnant  What Are the Side Effects of HRT?   Period may come back  Spotting  Breast soreness or breast swelling  Cramping or bloating  Risk of breast cancer  Blood clots in legs or lungs  Skin irritation with patch  Changes in mood  Upset stomach  Headaches  Will there be any other care needed?   Exercise regularly. Exercise can help boost your mood and make you feel better. Try to do 30 minutes of moderate activity, like a brisk walk, most days.  Wear loose clothing at bedtime. Sleep in a cool, well-ventilated room.  Limit spicy foods, caffeine, and beer, wine, and mixed drinks (alcohol) to lessen your hot flashes.  Quit smoking. Not smoking can help reduce hot flashes. Smoking also raises your risk of having serious health problems like heart disease and stroke.  Try relaxation. Imagery and deep breathing can help menopause signs.  What problems could happen?   Breast cancer  Blood clots  Heart disease  Stroke  Uterine cancer  Last Reviewed Date   2021-03-31  Consumer Information Use and Disclaimer   This generalized information is a limited summary of diagnosis, treatment, and/or medication information. It is not meant to be comprehensive and should be used as a tool to help the user understand and/or assess potential diagnostic and treatment options. It does NOT include all information about conditions, treatments, medications, side effects, or risks that may apply to a specific patient. It is not  intended to be medical advice or a substitute for the medical advice, diagnosis, or treatment of a health care provider based on the health care provider's examination and assessment of a patient’s specific and unique circumstances. Patients must speak with a health care provider for complete information about their health, medical questions, and treatment options, including any risks or benefits regarding use of medications. This information does not endorse any treatments or medications as safe, effective, or approved for treating a specific patient. UpToDate, Inc. and its affiliates disclaim any warranty or liability relating to this information or the use thereof. The use of this information is governed by the Terms of Use, available at https://www.TheRanking.com.Jaco Solarsi/en/know/clinical-effectiveness-terms   Copyright   Copyright © 2024 UpToDate, Inc. and its affiliates and/or licensors. All rights reserved.    Back pain.  Hot flashes.  These are not all of the side effects that may occur. If you have questions about side effects, call your doctor. Call your doctor for medical advice about side effects.  You may report side effects to your national health agency.  You may report side effects to the FDA at 1-939.474.7898. You may also report side effects at https://www.fda.gov/medwatch.  How is this drug best taken?   Use this drug as ordered by your doctor. Read all information given to you. Follow all instructions closely.  Take with or without food.  Swallow whole with some water or other drink.  Do not chew, break, or crush.  Take this drug at the same time of day.  What do I do if I miss a dose?   Take a missed dose as soon as you think about it.  If it is less than 12 hours until the next dose, skip the missed dose and go back to your normal time.  Do not take 2 doses at the same time or extra doses.  How do I store and/or throw out this drug?   Store at room temperature in a dry place. Do not store in a  bathroom.  Keep all drugs in a safe place. Keep all drugs out of the reach of children and pets.  Throw away unused or  drugs. Do not flush down a toilet or pour down a drain unless you are told to do so. Check with your pharmacist if you have questions about the best way to throw out drugs. There may be drug take-back programs in your area.  General drug facts   If your symptoms or health problems do not get better or if they become worse, call your doctor.  Do not share your drugs with others and do not take anyone else's drugs.  Some drugs may have another patient information leaflet. If you have any questions about this drug, please talk with your doctor, nurse, pharmacist, or other health care provider.  Some drugs may have another patient information leaflet. Check with your pharmacist. If you have any questions about this drug, please talk with your doctor, nurse, pharmacist, or other health care provider.  If you think there has been an overdose, call your poison control center or get medical care right away. Be ready to tell or show what was taken, how much, and when it happened.  Consumer Information Use and Disclaimer   This generalized information is a limited summary of diagnosis, treatment, and/or medication information. It is not meant to be comprehensive and should be used as a tool to help the user understand and/or assess potential diagnostic and treatment options. It does NOT include all information about conditions, treatments, medications, side effects, or risks that may apply to a specific patient. It is not intended to be medical advice or a substitute for the medical advice, diagnosis, or treatment of a health care provider based on the health care provider's examination and assessment of a patient's specific and unique circumstances. Patients must speak with a health care provider for complete information about their health, medical questions, and treatment options, including any  risks or benefits regarding use of medications. This information does not endorse any treatments or medications as safe, effective, or approved for treating a specific patient. UpToDate, Inc. and its affiliates disclaim any warranty or liability relating to this information or the use thereof. The use of this information is governed by the Terms of Use, available at https://www.DATAllegro.com/en/know/clinical-effectiveness-terms.  Last Reviewed Date   2023-05-17  Copyright   © 2024 UpToDate, Inc. and its affiliates and/or licensors. All rights reserved.

## 2025-05-14 NOTE — TELEPHONE ENCOUNTER
I have not seen patient yet, but reviewed Marc's notes  Zepbound was reduced with goal to maintain weight and it looks like she has lost a few pounds since the last visit.   Continue Zepbound 5mg for now-- can discuss further at her follow up visit.   Current BMI 24 is normal  Would recommend Referral to dietician for evaluation To make sure she is getting adequete protein/Fiber to help manage hunger rather than increase weight

## 2025-05-14 NOTE — PROGRESS NOTES
Virtual Regular VisitName: Bisi Orozco      : 1980      MRN: [id number]  Encounter Provider: MARY LUO Verma  Encounter Date: 2025   Encounter department: [facility]  :  Assessment & Plan  Symptomatic menopausal or female climacteric states         Reviewed estradiol and fSH results which reflect menopause.  Also reviewed labs ordered by her other providers.    Discussed options for management of vasomotor symptoms, specifically hormone therapy in the form of estradiol and progesterone, preferably in patch form and progesterone capsule. These will both help with hot flashes/sweats and sleep.   Reviewed potential adverse effects of hormone therapy being increased risk for breast ca, thromboembolic events (heart attack, stroke, pulmonary embolism).  Explained that these risks are mitigated by starting HT earlier in the menopausal stage rather than later (ie, nearing age 60).  Also explained that use of the estradiol patch is considered the safest route over oral products as the medication is directly absorbed without having to by pass the liver.      We also discussed non-hormonal treatment options of low dose SSRI, specifically Brisdelle, which is a low dose of paroxetine.  Potential side effects may be headache, fatigue, nausea, sleepiness or agitation, decreased libido and weight gain.  Being it is a very low dose these potential side effects should be less than taking the full dose needed for clinical anxiety.    Last non-hormonal option is Veozah, an order medication that acts at certain receptors in the brain to decrease hot flashes/ night sweats.  It cannot be used if one has a history of liver disease (cirrhosis) or renal disease.  While taking this medication we would check liver function lab work prior to starting the medication, then at 3, 6 and 9 months.  Possible side effects may include abdominal pain,diarrhea, insomnia, back pain, hot flashes.    Patient would  "like to consider her options before making a decision.  Patient education provided on all 3 options on her AVS.  She will let me know her decision.        History of Present Illness     HPI  MENOPAUSE CONSUTL  45 yo     Menopausal symptoms.  Post benign hysterectomy age 28.    Hot flashes - 5-6 during the day.  Sweats during the night-  3-4 times, disrupts her sleep.  Wilks --  anything \"sets her off\"  Denies vaginal dryness or pain.    2024 estradiol 15.7, FSH 92.5 (menopausal)--  3/2025 CBC low wbc; normal h/h.  3/2025 CMP normal  2024 TSH 1.8    Normal BP, non-tobacco user, normal BMI  Denies thromboembolic events, liver/gallbladder disease  Family history negative for breast cancer    Review of Systems  See HPI    Objective   Ht 5' 4\" (1.626 m)   Wt 64.9 kg (143 lb) Comment: pt provided- virtual visit  BMI 24.55 kg/m²     Physical Exam  Constitutional:       General: She is not in acute distress.     Appearance: Normal appearance. She is not ill-appearing or diaphoretic.   HENT:      Head: Normocephalic and atraumatic.     Neurological:      General: No focal deficit present.      Mental Status: She is alert and oriented to person, place, and time.     Psychiatric:         Mood and Affect: Mood normal.         Thought Content: Thought content normal.         Judgment: Judgment normal.         Administrative Statements   Encounter provider MARY LOU Verma    The Patient is located at Home and in the following state in which I hold an active license [place].    The patient was identified by name and date of birth. Bisi Orozco was informed that this is a telemedicine visit and that the visit is being conducted through the Epic Embedded platform. She agrees to proceed..  My office door was closed. No one else was in the room.  She acknowledged consent and understanding of privacy and security of the video platform. The patient has agreed to participate and understands they can discontinue the visit " at any time.    I have spent a total time of 45 minutes in caring for this patient on the day of the visit/encounter including Diagnostic results, Risks and benefits of tx options, Instructions for management, Patient and family education, Impressions, Counseling / Coordination of care, Documenting in the medical record, Reviewing/placing orders in the medical record (including tests, medications, and/or procedures), and Obtaining or reviewing history  , not including the time spent for establishing the audio/video connection.

## 2025-05-16 RX ORDER — TIRZEPATIDE 5 MG/.5ML
5 INJECTION, SOLUTION SUBCUTANEOUS WEEKLY
Qty: 2 ML | Refills: 2 | Status: SHIPPED | OUTPATIENT
Start: 2025-05-16

## 2025-06-10 ENCOUNTER — APPOINTMENT (OUTPATIENT)
Dept: LAB | Facility: HOSPITAL | Age: 45
End: 2025-06-10

## 2025-06-10 ENCOUNTER — LAB (OUTPATIENT)
Dept: LAB | Facility: HOSPITAL | Age: 45
End: 2025-06-10
Payer: COMMERCIAL

## 2025-06-10 DIAGNOSIS — Z00.8 HEALTH EXAMINATION IN POPULATION SURVEY: ICD-10-CM

## 2025-06-10 DIAGNOSIS — Z13.6 SCREENING FOR CARDIOVASCULAR CONDITION: ICD-10-CM

## 2025-06-10 LAB
ALBUMIN SERPL BCG-MCNC: 4.4 G/DL (ref 3.5–5)
ALP SERPL-CCNC: 68 U/L (ref 34–104)
ALT SERPL W P-5'-P-CCNC: 16 U/L (ref 7–52)
ANION GAP SERPL CALCULATED.3IONS-SCNC: 5 MMOL/L (ref 4–13)
AST SERPL W P-5'-P-CCNC: 19 U/L (ref 13–39)
BILIRUB SERPL-MCNC: 0.52 MG/DL (ref 0.2–1)
BUN SERPL-MCNC: 20 MG/DL (ref 5–25)
CALCIUM SERPL-MCNC: 9.6 MG/DL (ref 8.4–10.2)
CHLORIDE SERPL-SCNC: 102 MMOL/L (ref 96–108)
CHOLEST SERPL-MCNC: 181 MG/DL (ref ?–200)
CO2 SERPL-SCNC: 30 MMOL/L (ref 21–32)
CREAT SERPL-MCNC: 1.01 MG/DL (ref 0.6–1.3)
EST. AVERAGE GLUCOSE BLD GHB EST-MCNC: 117 MG/DL
GFR SERPL CREATININE-BSD FRML MDRD: 66 ML/MIN/1.73SQ M
GLUCOSE P FAST SERPL-MCNC: 82 MG/DL (ref 65–99)
HBA1C MFR BLD: 5.7 %
HDLC SERPL-MCNC: 95 MG/DL
LDLC SERPL CALC-MCNC: 74 MG/DL (ref 0–100)
NONHDLC SERPL-MCNC: 86 MG/DL
POTASSIUM SERPL-SCNC: 4.6 MMOL/L (ref 3.5–5.3)
PROT SERPL-MCNC: 8.2 G/DL (ref 6.4–8.4)
SODIUM SERPL-SCNC: 137 MMOL/L (ref 135–147)
TRIGL SERPL-MCNC: 58 MG/DL (ref ?–150)

## 2025-06-10 PROCEDURE — 36415 COLL VENOUS BLD VENIPUNCTURE: CPT

## 2025-06-10 PROCEDURE — 83036 HEMOGLOBIN GLYCOSYLATED A1C: CPT

## 2025-06-10 PROCEDURE — 80061 LIPID PANEL: CPT

## 2025-06-10 PROCEDURE — 80053 COMPREHEN METABOLIC PANEL: CPT

## 2025-06-12 DIAGNOSIS — R73.03 PREDIABETES: ICD-10-CM

## 2025-06-12 DIAGNOSIS — Z86.39 HISTORY OF OBESITY: ICD-10-CM

## 2025-06-13 RX ORDER — TIRZEPATIDE 5 MG/.5ML
5 INJECTION, SOLUTION SUBCUTANEOUS WEEKLY
Qty: 2 ML | Refills: 0 | OUTPATIENT
Start: 2025-06-13

## 2025-06-13 NOTE — TELEPHONE ENCOUNTER
Patient called to order refill on injection. I advised per Carlitos that there are two refills left at the pharmacy. Patient will contact pharmacy and did not have any further questions.

## 2025-07-02 ENCOUNTER — OFFICE VISIT (OUTPATIENT)
Age: 45
End: 2025-07-02
Payer: COMMERCIAL

## 2025-07-02 VITALS — TEMPERATURE: 97 F | WEIGHT: 148 LBS | BODY MASS INDEX: 25.4 KG/M2

## 2025-07-02 DIAGNOSIS — L81.1 MELASMA: ICD-10-CM

## 2025-07-02 DIAGNOSIS — L66.12 FRONTAL FIBROSING ALOPECIA: Primary | ICD-10-CM

## 2025-07-02 DIAGNOSIS — L63.9 ALOPECIA AREATA: ICD-10-CM

## 2025-07-02 PROCEDURE — 99213 OFFICE O/P EST LOW 20 MIN: CPT | Performed by: REGISTERED NURSE

## 2025-07-02 NOTE — PROGRESS NOTES
"[facility] Note     Patient Name: Bisi Orozco  Encounter Date: 7/2/25       Have you been cared for by a [facility] in the last 3 years and, if so, which description applies to you? Yes. I have been here within the last 3 years, and my medical history has NOT changed since that time. I am of child-bearing potential.     REVIEW OF SYSTEMS:  Have you recently had or currently have any of the following? No changes in my recent health.   PAST MEDICAL HISTORY:  Have you personally ever had or currently have any of the following?  If \"YES,\" then please provide more detail. No changes in my medical history.   HISTORY OF IMMUNOSUPPRESSION: Do you have a history of any of the following:  Systemic Immunosuppression such as Diabetes, Biologic or Immunotherapy, Chemotherapy, Organ Transplantation, Bone Marrow Transplantation or Prednisone?  No     Answering \"YES\" requires the addition of the dotphrase \"IMMUNOSUPPRESSED\" as the first diagnosis of the patient's visit.   FAMILY HISTORY:  Any \"first degree relatives\" (parent, brother, sister, or child) with the following?    No changes in my family's known health.   PATIENT EXPERIENCE:    Do you want the Dermatologist to perform a COMPLETE skin exam today including a clinical examination under the \"bra and underwear\" areas?  NO  If necessary, do we have your permission to call and leave a detailed message on your Preferred Phone number that includes your specific medical information?  Yes      Allergies[1] Current Medications[2]        Whom besides the patient is providing clinical information about today's encounter?   NO ADDITIONAL HISTORIAN (patient alone provided history)    Physical Exam and Assessment/Plan by Diagnosis:      FRONTAL FIBROSING ALOPECIA     Physical Exam:  Anatomic Location Affected:  frontal, temporal and occipital scalp   Morphological Description:  band of alopecia on frontal, temporal and occipital hair lines with loss " of follicular ostia and perifollicular scaling.      Additional History of Present Illness: Patient presents as a follow up. She stopped taking the finasteride about a month ago because she was experiencing side effects such as dizziness. She tried topical ruxolitinib phosphate cream in the past. Patient reports increased hair loss since the last visit. She denies tenderness, pain, and itching of the scalp.                       Assessment and Plan:  START using clobetasol 0.05% scalp solution; apply it to the scalp twice daily on Mondays, Wednesdays, and Fridays.  Discussed treatment options including doxycycline, hydroxychloroquine and she preferred the latter. Reviewed side effects.   START taking hydroxychloroquine 200 mg twice daily on Mondays, Tuesdays, Wednesdays, Thursdays and Fridays. Do not take on the weekends. Reviewed side effects. Patient advised to get eye exams from an ophthalmologist within the first 3 months of taking medication and then yearly thereafter.   Follow up 6 months.       MELASMA  Physical Exam:  Anatomic Location Affected:  face  Morphological Description:  brown patches   Pertinent Positives:  Pertinent Negatives:    Additional History of Present Condition:  noted on exam.    Assessment and Plan:  Based on a thorough discussion of this condition and the management approach to it (including a comprehensive discussion of the known risks, side effects and potential benefits of treatment), the patient (family) agrees to implement the following specific plan:  START applying a pea-sized amount of tretinoin 0.025% cream to the face nightly.   If tretinoin is not covered by insurance, you can use over the counter Differin gel; apply a pea-sized amount to the face nightly.    What is melasma?  Melasma is a chronic skin disorder that results in symmetrical, blotchy, brownish facial pigmentation. It can lead to considerable embarrassment and distress.  This form of facial pigmentation is  sometimes called chloasma, but as this means green skin, the term melasma (brown skin) is preferred.    Who gets melasma?  Melasma is more common in women than in men; only 1-in-4 to 1-in-20 affected individuals are male, depending on the population studied. It generally starts between the age of 20 and 40 years, but it can begin in childhood or not until middle age.  Melasma is more common in people that tan well or have naturally brown skin (Sanchez skin types 3 and 4) compared with those who have fair skin (skin types 1 and 2) or black skin (skin types 5 or 6).    What causes melasma?  The cause of melasma is complex. The pigmentation is due to overproduction of melanin by the pigment cells, melanocytes, which is taken up by the keratinocytes (epidermal melanosis) and/or deposited in the dermis (dermal melanosis, melanophages). There is a genetic predisposition to melasma, with at least one-third of patients reporting other family members to be affected. In most people melasma is a chronic disorder.  Known triggers for melasma include:  Sun exposure and sun damage--this is the most important avoidable risk factor   Pregnancy--in affected women, the pigment often fades a few months after delivery   Hormone treatments--oral contraceptive pills containing oestrogen and/or progesterone, hormone replacement, intrauterine devices and implants are a factor in about a quarter of affected women   Certain medications (including new targeted therapies for cancer), scented or deodorant soaps, toiletries and cosmetics--these may cause a phototoxic reaction that triggers melasma, which may then persist long term   Hypothyroidism (low levels of circulating thyroid hormone)    Melasma commonly arises in healthy, non-pregnant adults. Lifelong sun exposure causes deposition of pigment within the dermis and this often persists longterm. Exposure to ultraviolet radiation (UVR) deepens the pigmentation because it activates the  melanocytes to produce more melanin.    Research is attempting to pinpoint the roles of stem cell, neural, vascular and local hormonal factors in promoting melanocyte activation.    What are the clinical features of melasma?  Melasma presents as macules (freckle-like spots) and larger flat brown patches.These are found on both sides of the face and have an irregular border. There are several distinct patterns.  Centrofacial pattern: forehead, cheeks, nose and upper lips   Malar pattern: cheeks and nose   Lateral cheek pattern   Mandibular pattern: jawline   Reddened or inflamed forms of melasma (also called erythrosis pigmentosa faciei)   Poikiloderma of [place]: reddened, photoaging changes seen on the sides of the neck, mostly affecting patients older than 50 years   Brachial type of melasma affecting shoulders and upper arms (also called acquired brachial cutaneous dyschromatosis).    Melasma is sometimes  into epidermal (skin surface), dermal (deeper) and mixed types. A Wood lamp that emits black light (UVA1) may be used to identify the depth of the pigment.  Some general classifications include the following:    Epidermal melasma  Well-defined border   Dark brown colour   Appears more obvious under black light   Responds well to treatment    Dermal melasma  Ill-defined border   Light brown or bluish in colour   Unchanged under black light   Responds poorly to treatment\    Mixed melasma  The most common type   Combination of bluish, light and dark brown patches   Mixed pattern seen under black light   Partial improvement with treatment    What is the treatment of melasma?  Melasma can be very slow to respond to treatment, especially if it has been present for a long time. Treatment may result in irritant contact dermatitis in patients with sensitive skin, and this can result in post-inflammatory pigmentation.  Generally a combination of the following measures is helpful.    General  measures  Discontinue hormonal contraception.   Year-round life-long sun protection. Wear a broad-brimmed hat.   Use broad-spectrum very high protection factor (SPF 50+) sunscreen applied to the whole face daily, year-round. It should be reapplied every 2 hours if outdoors during the summer months. Sunscreens containing iron oxides are preferred, as they screen out some visible light as well as ultraviolet radiation. Alternatively or as well, use a make-up that contains sunscreen.   Use a mild cleanser, and if the skin is dry, a light moisturiser.   Cosmetic camouflage (make-up) is invaluable to disguise the pigment.    Topical therapy  Tyrosinase inhibitors are the mainstay of treatment. The aim is to prevent new pigment formation by inhibiting formation of melanin by the melanocytes.  Hydroquinone 2-4% as cream or lotion, applied accurately to pigmented areas at night for 2-4 months. This may cause contact dermatitis (stinging and redness) in 25% of patients. It should not be used in higher concentration or for prolonged courses as it has been associated with ochronosis (a bluish grey discolouration similar to that seen in alkaptonuria).   Azelaic acid cream, lotion or gel can be applied twice daily long term, and is safe in pregnancy. This may also sting.   Kojic acid or kojic acid dipalmitate is often included in formulations, as it binds copper, required by L-DOPA (a cofactor of tyrosinase). Kojic acid can cause irritant contact dermatitis and less commonly, allergic contact dermatitis.   The mechanism of action of cysteamine cream is unclear, but is thought to involve inhibition of tyrosinase. A study of 50 patients with melasma found cysteamine cream to be significantly more effective than placebo cream.   Ascorbic acid (vitamin C) also acts through copper to inhibit pigment production. It is well tolerated but highly unstable, so is usually combined with other agents.   Methimazole (antithyroid drug) cream  has been reported to reduce melanin synthesis and pigmentation in hydroquinone-resistant melasma.   New agents under investigation include zinc sulfate mequinol, arbutin and deoxyarbutin (from berries), licorice extract, rucinol, resveratrol, 4-hydroxy-anisole, 2,5-dimethyl-4-hydroxy-3(2H)-furanone and/or N-acetyl glucosamine    Other active compounds used for melasma include:  Topical corticosteroids such as hydrocortisone. These work quickly to fade the colour and reduce the likelihood of contact dermatitis caused by other agents. Potent topical steroids are best avoided due to their potential to cause adverse effects.   Soybean extract, which is thought to reduce the transfer of pigment from melanocytes to skin cells (keratinocytes) and to inhibit receptors.   Tranexamic acid has been used experimentally for melasma as a cream or injected into the skin (mesotherapy), showing some benefit. It may cause allergy or irritation.    Superficial or epidermal pigment can be peeled off. Peeling can also allow tyrosinase inhibitors to penetrate more effectively. These must be done carefully as peels may also induce post-inflammatory pigmentation.  Topical alpha hydroxyacids including glycolic acid and lactic acid, as creams or as repeated superficial chemical peels, remove the surface skin and their low pH inhibits the activity of tyrosinase.   Topical retinoids, such as tretinoin (a prescription medicine) are effective. Tretinoin can be hard to tolerate and sometimes causes contact dermatitis. Do not use during pregnancy.   Salicylic acid, a common peeling ingredient in skin creams, can also be used for chemical peels, but it is not very effective in melasma.    The most successful formulation has been a combination of hydroquinone, tretinoin, and moderate potency topical steroid. This has been found to result in improvement or clearance in up to 60-80% of those treated. Many other combinations of topical agents are in  common use, as they are more effective than any one alone. However, these products are often expensive.    Oral treatment of melasma  Oral medications for melasma are under investigation, including tranexamic acid. Tranexamic acid is a lysine analogue that inhibits plasmin and is usually used orally to stop bleeding. It reduces production of prostaglandins, the precursors of tyrosine. In low dose, tranexamic acid has been reported to be effective and safe in the treatment of melasma, providing patients have been carefully selected and are at low risk of thromboembolic disease.  Glutathione is also under investigation as a systemic skin whitening agent, but has potentially serious adverse effects.    Devices used to treat melasma  The ideal treatment for melasma would destroy the pigment, while leaving the cells alone. Unfortunately, this is hard to achieve. Machines can be used to remove epidermal pigmentation but with caution--over-treatment may cause postinflammatory pigmentation. Patients should be pretreated with a tyrosinase inhibitor (see above).  Fractional lasers, Q-switched Nd:YAG lasers and intense pulsed light (IPL) appear to be the most suitable options. Several treatments may be necessary and post-inflammatory hyperpigmentation may complicate recovery.  Carbon dioxide or erbium:YAG resurfacing lasers, pigment lasers (Q-switched germán and Alexandrite devices) and mechanical dermabrasion and microdermabrasion should be used with caution in the treatment of melasma.    What is the outcome of treatment of melasma?  Results take time and the above measures are rarely completely successful.  Unfortunately, even in those that get a good result from treatment, pigmentation may reappear on exposure to summer sun and/or because of hormonal factors. New topical and oral agents are being studied and offer hope for effective treatments in the future.       Mae Attestation      I,:  Billie Kat MA am acting as a  scribe while in the presence of the attending physician.:       I,:  José Miguel Philippe MD personally performed the services described in this documentation    as scribed in my presence.:                [1]   Allergies  Allergen Reactions    Morphine Hives   [2]   Current Outpatient Medications:     finasteride (PROSCAR) 5 mg tablet, Take 1 pill daily. (Patient not taking: Reported on 2/11/2025), Disp: 90 tablet, Rfl: 0    linaCLOtide (Linzess) 145 MCG CAPS, Take 1 capsule (145 mcg total) by mouth daily (Patient taking differently: Take 145 mcg by mouth if needed), Disp: 30 capsule, Rfl: 5    Metformin HCl POWD, Apply to affected areas of scalp once daily., Disp: 30 g, Rfl: 2    psyllium (METAMUCIL) 58.6 % powder, Take 1 packet by mouth 3 (three) times a day (Patient not taking: Reported on 5/14/2025), Disp: 660 g, Rfl: 2    tirzepatide (Zepbound) 5 mg/0.5 mL auto-injector, Inject 0.5 mL (5 mg total) under the skin once a week, Disp: 2 mL, Rfl: 2

## 2025-07-03 ENCOUNTER — TELEPHONE (OUTPATIENT)
Age: 45
End: 2025-07-03

## 2025-07-03 RX ORDER — HYDROXYCHLOROQUINE SULFATE 200 MG/1
TABLET, FILM COATED ORAL
Qty: 120 TABLET | Refills: 2 | Status: SHIPPED | OUTPATIENT
Start: 2025-07-03 | End: 2026-01-31

## 2025-07-03 RX ORDER — CLOBETASOL PROPIONATE 0.5 MG/ML
SOLUTION TOPICAL
Qty: 50 ML | Refills: 4 | Status: SHIPPED | OUTPATIENT
Start: 2025-07-03

## 2025-07-03 RX ORDER — TRETINOIN 0.25 MG/G
CREAM TOPICAL
Qty: 45 G | Refills: 4 | Status: SHIPPED | OUTPATIENT
Start: 2025-07-03

## 2025-07-03 NOTE — TELEPHONE ENCOUNTER
PA for Tretinoin 0.025% cream SUBMITTED to [employer]     via    []CMM-KEY:   [x]Surescripts-Case ID # [id number]   []Availity-Auth ID # NDC #   []Faxed to plan   []Other website   []Phone call Case ID #     [x]PA sent as URGENT    All office notes, labs and other pertaining documents and studies sent. Clinical questions answered. Awaiting determination from insurance company.     Turnaround time for your insurance to make a decision on your Prior Authorization can take 7-21 business days.

## 2025-07-03 NOTE — TELEPHONE ENCOUNTER
PA for Tretinoin 0.025% cream  APPROVED     Date(s) approved until 07/03/2026    Case #[id number]     Patient advised by          []MyChart Message  [x]Phone call   []LMOM  []L/M to call office as no active Communication consent on file  []Unable to leave detailed message as VM not approved on Communication consent       Pharmacy advised by    []Fax  [x]Phone call  []Secure Chat    Specialty Pharmacy    []     Approval letter scanned into Media No

## 2025-08-06 ENCOUNTER — TELEPHONE (OUTPATIENT)
Age: 45
End: 2025-08-06

## 2025-08-06 DIAGNOSIS — R73.03 PREDIABETES: ICD-10-CM

## 2025-08-06 DIAGNOSIS — Z86.39 HISTORY OF OBESITY: ICD-10-CM

## 2025-08-07 DIAGNOSIS — Z86.39 HISTORY OF OBESITY: Primary | ICD-10-CM

## 2025-08-07 DIAGNOSIS — R73.03 PREDIABETES: ICD-10-CM

## 2025-08-07 RX ORDER — TIRZEPATIDE 7.5 MG/.5ML
7.5 INJECTION, SOLUTION SUBCUTANEOUS WEEKLY
Qty: 2 ML | Refills: 0 | Status: SHIPPED | OUTPATIENT
Start: 2025-08-07 | End: 2025-08-12 | Stop reason: SDUPTHER

## 2025-08-08 RX ORDER — TIRZEPATIDE 5 MG/.5ML
5 INJECTION, SOLUTION SUBCUTANEOUS WEEKLY
Qty: 2 ML | Refills: 0 | OUTPATIENT
Start: 2025-08-08

## 2025-08-12 ENCOUNTER — OFFICE VISIT (OUTPATIENT)
Dept: BARIATRICS | Facility: CLINIC | Age: 45
End: 2025-08-12
Payer: COMMERCIAL

## 2025-08-12 PROBLEM — E66.3 OVERWEIGHT: Status: ACTIVE | Noted: 2024-03-26

## 2025-08-18 ENCOUNTER — OFFICE VISIT (OUTPATIENT)
Dept: FAMILY MEDICINE CLINIC | Facility: CLINIC | Age: 45
End: 2025-08-18
Payer: COMMERCIAL

## 2025-08-18 ENCOUNTER — TELEPHONE (OUTPATIENT)
Age: 45
End: 2025-08-18

## 2025-08-18 VITALS
HEART RATE: 64 BPM | DIASTOLIC BLOOD PRESSURE: 64 MMHG | HEIGHT: 64 IN | WEIGHT: 148.2 LBS | SYSTOLIC BLOOD PRESSURE: 100 MMHG | BODY MASS INDEX: 25.3 KG/M2 | OXYGEN SATURATION: 100 % | TEMPERATURE: 97.2 F

## 2025-08-18 DIAGNOSIS — M25.511 ACUTE PAIN OF RIGHT SHOULDER: Primary | ICD-10-CM

## 2025-08-18 PROCEDURE — 99214 OFFICE O/P EST MOD 30 MIN: CPT | Performed by: FAMILY MEDICINE

## 2025-08-19 ENCOUNTER — APPOINTMENT (OUTPATIENT)
Dept: URGENT CARE | Age: 45
End: 2025-08-19
Payer: OTHER MISCELLANEOUS

## 2025-08-19 PROCEDURE — G0383 LEV 4 HOSP TYPE B ED VISIT: HCPCS | Performed by: STUDENT IN AN ORGANIZED HEALTH CARE EDUCATION/TRAINING PROGRAM

## 2025-08-19 PROCEDURE — 99284 EMERGENCY DEPT VISIT MOD MDM: CPT | Performed by: STUDENT IN AN ORGANIZED HEALTH CARE EDUCATION/TRAINING PROGRAM

## 2025-08-19 RX ORDER — METHOCARBAMOL 500 MG/1
500 TABLET, FILM COATED ORAL 4 TIMES DAILY
Qty: 120 TABLET | Refills: 0 | Status: SHIPPED | OUTPATIENT
Start: 2025-08-19

## 2025-08-19 RX ORDER — NAPROXEN 500 MG/1
500 TABLET ORAL 2 TIMES DAILY WITH MEALS
Qty: 10 TABLET | Refills: 0 | Status: SHIPPED | OUTPATIENT
Start: 2025-08-19

## 2025-08-20 ENCOUNTER — TELEPHONE (OUTPATIENT)
Age: 45
End: 2025-08-20